# Patient Record
Sex: MALE | Race: WHITE | Employment: UNEMPLOYED | URBAN - METROPOLITAN AREA
[De-identification: names, ages, dates, MRNs, and addresses within clinical notes are randomized per-mention and may not be internally consistent; named-entity substitution may affect disease eponyms.]

---

## 2017-02-01 ENCOUNTER — HOSPITAL ENCOUNTER (OUTPATIENT)
Dept: CT IMAGING | Age: 37
Discharge: HOME | End: 2017-02-01
Attending: INTERNAL MEDICINE | Admitting: INTERNAL MEDICINE

## 2017-02-01 DIAGNOSIS — C18.2 CANCER OF RIGHT COLON (HCC): ICD-10-CM

## 2017-02-01 DIAGNOSIS — C18.2 MALIGNANT NEOPLASM OF ASCENDING COLON (HCC): ICD-10-CM

## 2017-02-01 DIAGNOSIS — C78.6 SECONDARY MALIGNANCY OF OMENTUM (HCC): ICD-10-CM

## 2017-02-07 PROBLEM — R52 PAIN MANAGEMENT: Status: ACTIVE | Noted: 2017-02-07

## 2017-06-05 PROBLEM — Z51.11 ENCOUNTER FOR ANTINEOPLASTIC CHEMOTHERAPY: Status: ACTIVE | Noted: 2017-06-05

## 2017-06-20 ENCOUNTER — HOSPITAL ENCOUNTER (OUTPATIENT)
Dept: CT IMAGING | Age: 37
Discharge: HOME | End: 2017-06-20
Attending: INTERNAL MEDICINE | Admitting: INTERNAL MEDICINE

## 2017-06-20 DIAGNOSIS — C18.2 CANCER OF RIGHT COLON (HCC): ICD-10-CM

## 2018-02-22 ENCOUNTER — HOSPITAL ENCOUNTER (OUTPATIENT)
Dept: CT IMAGING | Age: 38
Discharge: HOME | End: 2018-02-22
Attending: INTERNAL MEDICINE | Admitting: INTERNAL MEDICINE

## 2018-02-22 DIAGNOSIS — C18.2 ASCENDING COLON MALIGNANT NEOPLASM (HCC): ICD-10-CM

## 2018-04-02 ENCOUNTER — HOSPITAL ENCOUNTER (OUTPATIENT)
Dept: ULTRASOUND IMAGING | Age: 38
Discharge: HOME | End: 2018-04-02
Attending: PHYSICIAN ASSISTANT | Admitting: PHYSICIAN ASSISTANT

## 2018-04-02 DIAGNOSIS — N50.89 OTHER SPECIFIED DISORDERS OF THE MALE GENITAL ORGANS: ICD-10-CM

## 2018-04-17 ENCOUNTER — HOSPITAL ENCOUNTER (OUTPATIENT)
Dept: OTHER | Age: 38
Discharge: HOME | End: 2018-04-17
Attending: UROLOGY | Admitting: UROLOGY

## 2018-04-17 LAB
BILIRUBIN URINE: NEGATIVE
BLOOD, URINE: NEGATIVE
CLARITY: CLEAR
COLOR: YELLOW
GLUCOSE URINE: NEGATIVE MG/DL
KETONES, URINE: NEGATIVE MG/DL
LEUKOCYTE ESTERASE, URINE: NEGATIVE
MICROSCOPIC EXAMINATION: NORMAL
NITRITE, URINE: NEGATIVE
PH UA: 6.5
PROTEIN UA: NEGATIVE MG/DL
SPECIFIC GRAVITY UA: 1.02
URINE TYPE: NORMAL
UROBILINOGEN, URINE: 0.2 E.U./DL

## 2018-04-19 LAB — URINE CULTURE, ROUTINE: NORMAL

## 2018-04-25 ENCOUNTER — HOSPITAL ENCOUNTER (OUTPATIENT)
Dept: SURGERY | Age: 38
Discharge: HOME | End: 2018-04-25
Attending: UROLOGY | Admitting: UROLOGY

## 2018-04-25 VITALS
DIASTOLIC BLOOD PRESSURE: 68 MMHG | BODY MASS INDEX: 28.63 KG/M2 | SYSTOLIC BLOOD PRESSURE: 109 MMHG | HEIGHT: 70 IN | OXYGEN SATURATION: 96 % | TEMPERATURE: 97.2 F | RESPIRATION RATE: 20 BRPM | HEART RATE: 79 BPM | WEIGHT: 200 LBS

## 2018-04-25 DIAGNOSIS — G89.18 POST-OP PAIN: Primary | ICD-10-CM

## 2018-04-25 RX ORDER — SODIUM CHLORIDE 0.9 % (FLUSH) 0.9 %
10 SYRINGE (ML) INJECTION PRN
Status: DISCONTINUED | OUTPATIENT
Start: 2018-04-25 | End: 2018-04-26 | Stop reason: HOSPADM

## 2018-04-25 RX ORDER — OXYCODONE AND ACETAMINOPHEN 10; 325 MG/1; MG/1
1 TABLET ORAL EVERY 6 HOURS PRN
Qty: 20 TABLET | Refills: 0 | Status: SHIPPED | OUTPATIENT
Start: 2018-04-25 | End: 2018-05-02

## 2018-04-25 RX ORDER — DEXAMETHASONE SODIUM PHOSPHATE 4 MG/ML
4 INJECTION, SOLUTION INTRA-ARTICULAR; INTRALESIONAL; INTRAMUSCULAR; INTRAVENOUS; SOFT TISSUE
Status: ACTIVE | OUTPATIENT
Start: 2018-04-25 | End: 2018-04-25

## 2018-04-25 RX ORDER — LIDOCAINE HYDROCHLORIDE 10 MG/ML
1 INJECTION, SOLUTION EPIDURAL; INFILTRATION; INTRACAUDAL; PERINEURAL
Status: ACTIVE | OUTPATIENT
Start: 2018-04-25 | End: 2018-04-25

## 2018-04-25 RX ORDER — SODIUM CHLORIDE, SODIUM LACTATE, POTASSIUM CHLORIDE, CALCIUM CHLORIDE 600; 310; 30; 20 MG/100ML; MG/100ML; MG/100ML; MG/100ML
INJECTION, SOLUTION INTRAVENOUS CONTINUOUS
Status: DISCONTINUED | OUTPATIENT
Start: 2018-04-25 | End: 2018-04-26 | Stop reason: HOSPADM

## 2018-04-25 RX ORDER — FENTANYL CITRATE 50 UG/ML
50 INJECTION, SOLUTION INTRAMUSCULAR; INTRAVENOUS EVERY 5 MIN PRN
Status: DISCONTINUED | OUTPATIENT
Start: 2018-04-25 | End: 2018-04-26 | Stop reason: HOSPADM

## 2018-04-25 RX ORDER — FENTANYL CITRATE 50 UG/ML
25 INJECTION, SOLUTION INTRAMUSCULAR; INTRAVENOUS EVERY 5 MIN PRN
Status: DISCONTINUED | OUTPATIENT
Start: 2018-04-25 | End: 2018-04-26 | Stop reason: HOSPADM

## 2018-04-25 RX ORDER — SODIUM CHLORIDE 0.9 % (FLUSH) 0.9 %
SYRINGE (ML) INJECTION
Status: DISPENSED
Start: 2018-04-25 | End: 2018-04-25

## 2018-04-25 RX ORDER — SODIUM CHLORIDE 0.9 % (FLUSH) 0.9 %
10 SYRINGE (ML) INJECTION EVERY 12 HOURS SCHEDULED
Status: DISCONTINUED | OUTPATIENT
Start: 2018-04-25 | End: 2018-04-26 | Stop reason: HOSPADM

## 2018-04-25 RX ORDER — ONDANSETRON 2 MG/ML
4 INJECTION INTRAMUSCULAR; INTRAVENOUS
Status: ACTIVE | OUTPATIENT
Start: 2018-04-25 | End: 2018-04-25

## 2018-04-25 RX ADMIN — SODIUM CHLORIDE, SODIUM LACTATE, POTASSIUM CHLORIDE, CALCIUM CHLORIDE: 600; 310; 30; 20 INJECTION, SOLUTION INTRAVENOUS at 11:03

## 2018-04-25 RX ADMIN — SODIUM CHLORIDE, SODIUM LACTATE, POTASSIUM CHLORIDE, CALCIUM CHLORIDE: 600; 310; 30; 20 INJECTION, SOLUTION INTRAVENOUS at 09:08

## 2018-04-25 ASSESSMENT — PAIN - FUNCTIONAL ASSESSMENT: PAIN_FUNCTIONAL_ASSESSMENT: 0-10

## 2018-04-25 ASSESSMENT — PAIN SCALES - GENERAL
PAINLEVEL_OUTOF10: 0
PAINLEVEL_OUTOF10: 2
PAINLEVEL_OUTOF10: 2

## 2018-04-25 ASSESSMENT — LIFESTYLE VARIABLES: SMOKING_STATUS: 1

## 2018-04-25 ASSESSMENT — PAIN DESCRIPTION - DESCRIPTORS
DESCRIPTORS: BURNING
DESCRIPTORS: CONSTANT

## 2018-04-25 ASSESSMENT — PAIN DESCRIPTION - LOCATION
LOCATION: SCROTUM
LOCATION: SCROTUM

## 2018-04-25 ASSESSMENT — PAIN DESCRIPTION - PAIN TYPE
TYPE: SURGICAL PAIN
TYPE: SURGICAL PAIN

## 2018-06-11 PROBLEM — K86.0 CHRONIC ALCOHOLIC PANCREATITIS (HCC): Status: ACTIVE | Noted: 2018-06-11

## 2018-09-26 PROBLEM — Z51.11 ENCOUNTER FOR ANTINEOPLASTIC CHEMOTHERAPY: Status: RESOLVED | Noted: 2017-06-05 | Resolved: 2018-09-26

## 2019-02-04 ENCOUNTER — HOSPITAL ENCOUNTER (OUTPATIENT)
Dept: CT IMAGING | Age: 39
Discharge: HOME | End: 2019-02-04
Payer: MEDICARE

## 2019-02-04 ENCOUNTER — HOSPITAL ENCOUNTER (OUTPATIENT)
Age: 39
Discharge: HOME | End: 2019-02-04
Payer: MEDICARE

## 2019-02-04 DIAGNOSIS — C18.2 ASCENDING COLON MALIGNANT NEOPLASM (HCC): ICD-10-CM

## 2019-02-04 PROCEDURE — 74177 CT ABD & PELVIS W/CONTRAST: CPT

## 2019-02-04 PROCEDURE — 6360000004 HC RX CONTRAST MEDICATION: Performed by: INTERNAL MEDICINE

## 2019-02-04 RX ADMIN — IOPAMIDOL 100 ML: 755 INJECTION, SOLUTION INTRAVENOUS at 18:13

## 2019-02-04 RX ADMIN — IOHEXOL 50 ML: 240 INJECTION, SOLUTION INTRATHECAL; INTRAVASCULAR; INTRAVENOUS; ORAL at 18:13

## 2019-05-07 ENCOUNTER — HOSPITAL ENCOUNTER (INPATIENT)
Age: 39
LOS: 2 days | Discharge: HOME | DRG: 254 | End: 2019-05-10
Attending: EMERGENCY MEDICINE | Admitting: INTERNAL MEDICINE
Payer: MEDICARE

## 2019-05-07 DIAGNOSIS — K43.6 VENTRAL HERNIA WITH OBSTRUCTION AND WITHOUT GANGRENE: Primary | ICD-10-CM

## 2019-05-07 DIAGNOSIS — Z79.891 CHRONIC PRESCRIPTION OPIATE USE: ICD-10-CM

## 2019-05-07 DIAGNOSIS — K59.00 CONSTIPATION, UNSPECIFIED CONSTIPATION TYPE: ICD-10-CM

## 2019-05-07 DIAGNOSIS — K56.609 SBO (SMALL BOWEL OBSTRUCTION) (HCC): ICD-10-CM

## 2019-05-07 DIAGNOSIS — R11.2 NON-INTRACTABLE VOMITING WITH NAUSEA, UNSPECIFIED VOMITING TYPE: ICD-10-CM

## 2019-05-07 DIAGNOSIS — Z85.038 H/O COLON CANCER, STAGE IV: ICD-10-CM

## 2019-05-07 LAB
A/G RATIO: 1.3 (ref 1.1–2.2)
ALBUMIN SERPL-MCNC: 4 G/DL (ref 3.4–5)
ALP BLD-CCNC: 85 U/L (ref 40–129)
ALT SERPL-CCNC: 19 U/L (ref 10–40)
ANION GAP SERPL CALCULATED.3IONS-SCNC: 9 MMOL/L (ref 3–16)
AST SERPL-CCNC: 16 U/L (ref 15–37)
BASOPHILS ABSOLUTE: 0.1 K/UL (ref 0–0.2)
BASOPHILS RELATIVE PERCENT: 0.8 %
BILIRUB SERPL-MCNC: 0.5 MG/DL (ref 0–1)
BUN BLDV-MCNC: 8 MG/DL (ref 7–20)
CALCIUM SERPL-MCNC: 9 MG/DL (ref 8.3–10.6)
CHLORIDE BLD-SCNC: 98 MMOL/L (ref 99–110)
CO2: 27 MMOL/L (ref 21–32)
CREAT SERPL-MCNC: 0.7 MG/DL (ref 0.9–1.3)
EOSINOPHILS ABSOLUTE: 0.3 K/UL (ref 0–0.6)
EOSINOPHILS RELATIVE PERCENT: 3.4 %
GFR AFRICAN AMERICAN: >60
GFR NON-AFRICAN AMERICAN: >60
GLOBULIN: 3.2 G/DL
GLUCOSE BLD-MCNC: 101 MG/DL (ref 70–99)
HCT VFR BLD CALC: 40.8 % (ref 40.5–52.5)
HEMOGLOBIN: 14 G/DL (ref 13.5–17.5)
LIPASE: 40 U/L (ref 13–60)
LYMPHOCYTES ABSOLUTE: 2.7 K/UL (ref 1–5.1)
LYMPHOCYTES RELATIVE PERCENT: 30.4 %
MCH RBC QN AUTO: 30.5 PG (ref 26–34)
MCHC RBC AUTO-ENTMCNC: 34.4 G/DL (ref 31–36)
MCV RBC AUTO: 88.8 FL (ref 80–100)
MONOCYTES ABSOLUTE: 0.5 K/UL (ref 0–1.3)
MONOCYTES RELATIVE PERCENT: 5.9 %
NEUTROPHILS ABSOLUTE: 5.2 K/UL (ref 1.7–7.7)
NEUTROPHILS RELATIVE PERCENT: 59.5 %
PDW BLD-RTO: 13.7 % (ref 12.4–15.4)
PLATELET # BLD: 261 K/UL (ref 135–450)
PMV BLD AUTO: 7.7 FL (ref 5–10.5)
POTASSIUM SERPL-SCNC: 3.5 MMOL/L (ref 3.5–5.1)
RBC # BLD: 4.59 M/UL (ref 4.2–5.9)
SODIUM BLD-SCNC: 134 MMOL/L (ref 136–145)
TOTAL PROTEIN: 7.2 G/DL (ref 6.4–8.2)
WBC # BLD: 8.8 K/UL (ref 4–11)

## 2019-05-07 PROCEDURE — 96375 TX/PRO/DX INJ NEW DRUG ADDON: CPT

## 2019-05-07 PROCEDURE — 96374 THER/PROPH/DIAG INJ IV PUSH: CPT

## 2019-05-07 PROCEDURE — 96361 HYDRATE IV INFUSION ADD-ON: CPT

## 2019-05-07 PROCEDURE — 2580000003 HC RX 258: Performed by: NURSE PRACTITIONER

## 2019-05-07 PROCEDURE — 80053 COMPREHEN METABOLIC PANEL: CPT

## 2019-05-07 PROCEDURE — 99285 EMERGENCY DEPT VISIT HI MDM: CPT

## 2019-05-07 PROCEDURE — 6360000002 HC RX W HCPCS: Performed by: NURSE PRACTITIONER

## 2019-05-07 PROCEDURE — 83690 ASSAY OF LIPASE: CPT

## 2019-05-07 PROCEDURE — 85025 COMPLETE CBC W/AUTO DIFF WBC: CPT

## 2019-05-07 RX ORDER — ONDANSETRON 2 MG/ML
4 INJECTION INTRAMUSCULAR; INTRAVENOUS EVERY 30 MIN PRN
Status: DISCONTINUED | OUTPATIENT
Start: 2019-05-07 | End: 2019-05-08

## 2019-05-07 RX ORDER — 0.9 % SODIUM CHLORIDE 0.9 %
1000 INTRAVENOUS SOLUTION INTRAVENOUS ONCE
Status: COMPLETED | OUTPATIENT
Start: 2019-05-07 | End: 2019-05-08

## 2019-05-07 RX ADMIN — SODIUM CHLORIDE 1000 ML: 9 INJECTION, SOLUTION INTRAVENOUS at 23:49

## 2019-05-07 RX ADMIN — HYDROMORPHONE HYDROCHLORIDE 1 MG: 1 INJECTION, SOLUTION INTRAMUSCULAR; INTRAVENOUS; SUBCUTANEOUS at 23:50

## 2019-05-07 RX ADMIN — ONDANSETRON 4 MG: 2 INJECTION INTRAMUSCULAR; INTRAVENOUS at 23:50

## 2019-05-07 ASSESSMENT — PAIN DESCRIPTION - PAIN TYPE: TYPE: ACUTE PAIN

## 2019-05-07 ASSESSMENT — PAIN SCALES - GENERAL
PAINLEVEL_OUTOF10: 10
PAINLEVEL_OUTOF10: 10

## 2019-05-07 ASSESSMENT — PAIN DESCRIPTION - LOCATION: LOCATION: ABDOMEN

## 2019-05-08 ENCOUNTER — APPOINTMENT (OUTPATIENT)
Dept: CT IMAGING | Age: 39
DRG: 254 | End: 2019-05-08
Payer: MEDICARE

## 2019-05-08 PROBLEM — K56.609 BOWEL OBSTRUCTION (HCC): Status: ACTIVE | Noted: 2019-05-08

## 2019-05-08 LAB
ALBUMIN SERPL-MCNC: 3.8 G/DL (ref 3.4–5)
ANION GAP SERPL CALCULATED.3IONS-SCNC: 7 MMOL/L (ref 3–16)
BASOPHILS ABSOLUTE: 0.1 K/UL (ref 0–0.2)
BASOPHILS RELATIVE PERCENT: 0.9 %
BILIRUBIN URINE: NEGATIVE
BLOOD, URINE: NEGATIVE
BUN BLDV-MCNC: 7 MG/DL (ref 7–20)
CALCIUM SERPL-MCNC: 8.5 MG/DL (ref 8.3–10.6)
CHLORIDE BLD-SCNC: 104 MMOL/L (ref 99–110)
CLARITY: CLEAR
CO2: 27 MMOL/L (ref 21–32)
COLOR: YELLOW
CREAT SERPL-MCNC: 0.6 MG/DL (ref 0.9–1.3)
EOSINOPHILS ABSOLUTE: 0.3 K/UL (ref 0–0.6)
EOSINOPHILS RELATIVE PERCENT: 4.7 %
GFR AFRICAN AMERICAN: >60
GFR NON-AFRICAN AMERICAN: >60
GLUCOSE BLD-MCNC: 103 MG/DL (ref 70–99)
GLUCOSE URINE: NEGATIVE MG/DL
HCT VFR BLD CALC: 39.3 % (ref 40.5–52.5)
HEMOGLOBIN: 13.5 G/DL (ref 13.5–17.5)
KETONES, URINE: NEGATIVE MG/DL
LACTIC ACID: 0.6 MMOL/L (ref 0.4–2)
LEUKOCYTE ESTERASE, URINE: NEGATIVE
LYMPHOCYTES ABSOLUTE: 2.5 K/UL (ref 1–5.1)
LYMPHOCYTES RELATIVE PERCENT: 36 %
MCH RBC QN AUTO: 30.9 PG (ref 26–34)
MCHC RBC AUTO-ENTMCNC: 34.4 G/DL (ref 31–36)
MCV RBC AUTO: 89.8 FL (ref 80–100)
MICROSCOPIC EXAMINATION: NORMAL
MONOCYTES ABSOLUTE: 0.5 K/UL (ref 0–1.3)
MONOCYTES RELATIVE PERCENT: 6.8 %
NEUTROPHILS ABSOLUTE: 3.6 K/UL (ref 1.7–7.7)
NEUTROPHILS RELATIVE PERCENT: 51.6 %
NITRITE, URINE: NEGATIVE
PDW BLD-RTO: 13.8 % (ref 12.4–15.4)
PH UA: 7 (ref 5–8)
PHOSPHORUS: 4.4 MG/DL (ref 2.5–4.9)
PLATELET # BLD: 215 K/UL (ref 135–450)
PMV BLD AUTO: 7.6 FL (ref 5–10.5)
POTASSIUM SERPL-SCNC: 3.5 MMOL/L (ref 3.5–5.1)
PROTEIN UA: NEGATIVE MG/DL
RBC # BLD: 4.37 M/UL (ref 4.2–5.9)
SODIUM BLD-SCNC: 138 MMOL/L (ref 136–145)
SPECIFIC GRAVITY UA: <=1.005 (ref 1–1.03)
URINE TYPE: NORMAL
UROBILINOGEN, URINE: 0.2 E.U./DL
WBC # BLD: 7 K/UL (ref 4–11)

## 2019-05-08 PROCEDURE — 6360000002 HC RX W HCPCS: Performed by: INTERNAL MEDICINE

## 2019-05-08 PROCEDURE — 80069 RENAL FUNCTION PANEL: CPT

## 2019-05-08 PROCEDURE — 96375 TX/PRO/DX INJ NEW DRUG ADDON: CPT

## 2019-05-08 PROCEDURE — 2580000003 HC RX 258: Performed by: EMERGENCY MEDICINE

## 2019-05-08 PROCEDURE — 2580000003 HC RX 258: Performed by: INTERNAL MEDICINE

## 2019-05-08 PROCEDURE — 96376 TX/PRO/DX INJ SAME DRUG ADON: CPT

## 2019-05-08 PROCEDURE — 81003 URINALYSIS AUTO W/O SCOPE: CPT

## 2019-05-08 PROCEDURE — 96361 HYDRATE IV INFUSION ADD-ON: CPT

## 2019-05-08 PROCEDURE — 74177 CT ABD & PELVIS W/CONTRAST: CPT

## 2019-05-08 PROCEDURE — 6360000002 HC RX W HCPCS

## 2019-05-08 PROCEDURE — 99255 IP/OBS CONSLTJ NEW/EST HI 80: CPT | Performed by: SURGERY

## 2019-05-08 PROCEDURE — 83605 ASSAY OF LACTIC ACID: CPT

## 2019-05-08 PROCEDURE — 6360000004 HC RX CONTRAST MEDICATION: Performed by: NURSE PRACTITIONER

## 2019-05-08 PROCEDURE — 85025 COMPLETE CBC W/AUTO DIFF WBC: CPT

## 2019-05-08 PROCEDURE — 1200000000 HC SEMI PRIVATE

## 2019-05-08 PROCEDURE — 6360000002 HC RX W HCPCS: Performed by: EMERGENCY MEDICINE

## 2019-05-08 PROCEDURE — 6360000002 HC RX W HCPCS: Performed by: NURSE PRACTITIONER

## 2019-05-08 RX ORDER — LORAZEPAM 2 MG/ML
2 INJECTION INTRAMUSCULAR ONCE
Status: COMPLETED | OUTPATIENT
Start: 2019-05-08 | End: 2019-05-08

## 2019-05-08 RX ORDER — LORAZEPAM 2 MG/ML
INJECTION INTRAMUSCULAR
Status: COMPLETED
Start: 2019-05-08 | End: 2019-05-08

## 2019-05-08 RX ORDER — HYDROMORPHONE HCL 110MG/55ML
0.5 PATIENT CONTROLLED ANALGESIA SYRINGE INTRAVENOUS
Status: DISCONTINUED | OUTPATIENT
Start: 2019-05-08 | End: 2019-05-08

## 2019-05-08 RX ORDER — HYDROMORPHONE HCL 110MG/55ML
1 PATIENT CONTROLLED ANALGESIA SYRINGE INTRAVENOUS
Status: DISCONTINUED | OUTPATIENT
Start: 2019-05-08 | End: 2019-05-09

## 2019-05-08 RX ORDER — SODIUM CHLORIDE 0.9 % (FLUSH) 0.9 %
10 SYRINGE (ML) INJECTION EVERY 12 HOURS SCHEDULED
Status: DISCONTINUED | OUTPATIENT
Start: 2019-05-08 | End: 2019-05-10 | Stop reason: HOSPADM

## 2019-05-08 RX ORDER — ONDANSETRON 2 MG/ML
4 INJECTION INTRAMUSCULAR; INTRAVENOUS EVERY 6 HOURS PRN
Status: DISCONTINUED | OUTPATIENT
Start: 2019-05-08 | End: 2019-05-10 | Stop reason: HOSPADM

## 2019-05-08 RX ORDER — SODIUM CHLORIDE 9 MG/ML
INJECTION, SOLUTION INTRAVENOUS CONTINUOUS
Status: DISCONTINUED | OUTPATIENT
Start: 2019-05-08 | End: 2019-05-10 | Stop reason: HOSPADM

## 2019-05-08 RX ORDER — SODIUM CHLORIDE, SODIUM LACTATE, POTASSIUM CHLORIDE, CALCIUM CHLORIDE 600; 310; 30; 20 MG/100ML; MG/100ML; MG/100ML; MG/100ML
1000 INJECTION, SOLUTION INTRAVENOUS ONCE
Status: COMPLETED | OUTPATIENT
Start: 2019-05-08 | End: 2019-05-08

## 2019-05-08 RX ORDER — SODIUM CHLORIDE 0.9 % (FLUSH) 0.9 %
10 SYRINGE (ML) INJECTION PRN
Status: DISCONTINUED | OUTPATIENT
Start: 2019-05-08 | End: 2019-05-10 | Stop reason: HOSPADM

## 2019-05-08 RX ADMIN — IOPAMIDOL 75 ML: 755 INJECTION, SOLUTION INTRAVENOUS at 00:56

## 2019-05-08 RX ADMIN — SODIUM CHLORIDE: 9 INJECTION, SOLUTION INTRAVENOUS at 08:17

## 2019-05-08 RX ADMIN — HYDROMORPHONE HYDROCHLORIDE 1 MG: 2 INJECTION, SOLUTION INTRAMUSCULAR; INTRAVENOUS; SUBCUTANEOUS at 20:19

## 2019-05-08 RX ADMIN — HYDROMORPHONE HYDROCHLORIDE 0.5 MG: 2 INJECTION, SOLUTION INTRAMUSCULAR; INTRAVENOUS; SUBCUTANEOUS at 06:01

## 2019-05-08 RX ADMIN — HYDROMORPHONE HYDROCHLORIDE 1 MG: 2 INJECTION, SOLUTION INTRAMUSCULAR; INTRAVENOUS; SUBCUTANEOUS at 18:11

## 2019-05-08 RX ADMIN — HYDROMORPHONE HYDROCHLORIDE 1 MG: 1 INJECTION, SOLUTION INTRAMUSCULAR; INTRAVENOUS; SUBCUTANEOUS at 03:51

## 2019-05-08 RX ADMIN — SODIUM CHLORIDE: 9 INJECTION, SOLUTION INTRAVENOUS at 05:51

## 2019-05-08 RX ADMIN — HYDROMORPHONE HYDROCHLORIDE 0.5 MG: 1 INJECTION, SOLUTION INTRAMUSCULAR; INTRAVENOUS; SUBCUTANEOUS at 01:39

## 2019-05-08 RX ADMIN — HYDROMORPHONE HYDROCHLORIDE 1 MG: 2 INJECTION, SOLUTION INTRAMUSCULAR; INTRAVENOUS; SUBCUTANEOUS at 15:55

## 2019-05-08 RX ADMIN — LORAZEPAM 2 MG: 2 INJECTION, SOLUTION INTRAMUSCULAR; INTRAVENOUS at 03:53

## 2019-05-08 RX ADMIN — HYDROMORPHONE HYDROCHLORIDE 0.5 MG: 2 INJECTION, SOLUTION INTRAMUSCULAR; INTRAVENOUS; SUBCUTANEOUS at 08:18

## 2019-05-08 RX ADMIN — HYDROMORPHONE HYDROCHLORIDE 1 MG: 2 INJECTION, SOLUTION INTRAMUSCULAR; INTRAVENOUS; SUBCUTANEOUS at 11:30

## 2019-05-08 RX ADMIN — LORAZEPAM 2 MG: 2 INJECTION INTRAMUSCULAR at 03:53

## 2019-05-08 RX ADMIN — HYDROMORPHONE HYDROCHLORIDE 0.5 MG: 1 INJECTION, SOLUTION INTRAMUSCULAR; INTRAVENOUS; SUBCUTANEOUS at 02:50

## 2019-05-08 RX ADMIN — SODIUM CHLORIDE, POTASSIUM CHLORIDE, SODIUM LACTATE AND CALCIUM CHLORIDE 1000 ML: 600; 310; 30; 20 INJECTION, SOLUTION INTRAVENOUS at 02:43

## 2019-05-08 RX ADMIN — HYDROMORPHONE HYDROCHLORIDE 1 MG: 2 INJECTION, SOLUTION INTRAMUSCULAR; INTRAVENOUS; SUBCUTANEOUS at 22:14

## 2019-05-08 RX ADMIN — HYDROMORPHONE HYDROCHLORIDE 1 MG: 2 INJECTION, SOLUTION INTRAMUSCULAR; INTRAVENOUS; SUBCUTANEOUS at 13:55

## 2019-05-08 RX ADMIN — Medication 10 ML: at 20:20

## 2019-05-08 ASSESSMENT — PAIN DESCRIPTION - DESCRIPTORS
DESCRIPTORS: SPASM
DESCRIPTORS: CONSTANT

## 2019-05-08 ASSESSMENT — PAIN DESCRIPTION - LOCATION
LOCATION: ABDOMEN

## 2019-05-08 ASSESSMENT — PAIN DESCRIPTION - PAIN TYPE
TYPE: ACUTE PAIN

## 2019-05-08 ASSESSMENT — PAIN DESCRIPTION - ONSET
ONSET: ON-GOING
ONSET: ON-GOING

## 2019-05-08 ASSESSMENT — PAIN SCALES - GENERAL
PAINLEVEL_OUTOF10: 7
PAINLEVEL_OUTOF10: 10
PAINLEVEL_OUTOF10: 7
PAINLEVEL_OUTOF10: 7
PAINLEVEL_OUTOF10: 8
PAINLEVEL_OUTOF10: 9
PAINLEVEL_OUTOF10: 7
PAINLEVEL_OUTOF10: 8
PAINLEVEL_OUTOF10: 9
PAINLEVEL_OUTOF10: 8
PAINLEVEL_OUTOF10: 8
PAINLEVEL_OUTOF10: 7
PAINLEVEL_OUTOF10: 8
PAINLEVEL_OUTOF10: 8

## 2019-05-08 ASSESSMENT — PAIN DESCRIPTION - PROGRESSION: CLINICAL_PROGRESSION: NOT CHANGED

## 2019-05-08 ASSESSMENT — PAIN DESCRIPTION - FREQUENCY
FREQUENCY: CONTINUOUS
FREQUENCY: CONTINUOUS

## 2019-05-08 ASSESSMENT — PAIN DESCRIPTION - ORIENTATION
ORIENTATION: MID
ORIENTATION: LOWER;MID

## 2019-05-08 NOTE — ED NOTES
Provided recliner to wife to rest at bedside      Juancarlos Pennington [facility]  05/08/19 2630

## 2019-05-08 NOTE — H&P
mg by mouth every 12 hours as needed. . 8/7/17  Yes Juan Jose José Miguel, APRN - CNP       Allergies:  Patient has no known allergies. Social History:      The patient currently lives at home    TOBACCO:   reports that he has been smoking cigarettes. He has a 5.00 pack-year smoking history. He has never used smokeless tobacco.  ETOH:   reports that he drinks alcohol. Family History:      Negative for colon cancer, heart disease, lung disease. History reviewed. No pertinent family history. REVIEW OF SYSTEMS:   Pertinent positives as noted in the HPI. All other systems reviewed and negative. PHYSICAL EXAM PERFORMED:    /70   Pulse 81   Temp 98.3 °F (36.8 °C) (Oral)   Resp 16   Ht 5' 10\" (1.778 m)   Wt 210 lb (95.3 kg)   SpO2 93%   BMI 30.13 kg/m²     General appearance:  No apparent distress, appears stated age and cooperative. HEENT:  Normal cephalic, atraumatic without obvious deformity. Pupils equal, round, and reactive to light. Extra ocular muscles intact. Conjunctivae/corneas clear. Respiratory:  Normal respiratory effort. Clear to auscultation, bilaterally without Rales/Wheezes/Rhonchi. Cardiovascular:  Regular rate and rhythm with normal S1/S2 without murmurs, rubs or gallops. Abdomen: Soft, tender to palpation, palpable hernias present. Musculoskeletal:  No clubbing, cyanosis or edema bilaterally. Skin: Skin color, texture, turgor normal.  Warm and dry  Neurologic:  Neurovascularly intact without any focal sensory/motor deficits.  Cranial nerves: II-XII intact, grossly non-focal.  Psychiatric:  Alert and oriented, thought content appropriate, normal insight  Capillary Refill: Brisk,< 3 seconds   Peripheral Pulses: +2 palpable, equal bilaterally       Labs:     Recent Labs     05/07/19 2255 05/08/19  0535   WBC 8.8 7.0   HGB 14.0 13.5   HCT 40.8 39.3*    215     Recent Labs     05/07/19 2255 05/08/19  0535   * 138   K 3.5 3.5   CL 98* 104   CO2 27 27   BUN 8 7 CREATININE 0.7* 0.6*   CALCIUM 9.0 8.5   PHOS  --  4.4     Recent Labs     05/07/19  2255   AST 16   ALT 19   BILITOT 0.5   ALKPHOS 85     No results for input(s): INR in the last 72 hours. No results for input(s): Delcie Bahama in the last 72 hours. Urinalysis:      Lab Results   Component Value Date    NITRU Negative 05/08/2019    WBCUA 0-2 09/08/2016    BACTERIA 1+ 08/18/2016    RBCUA 3-5 09/08/2016    BLOODU Negative 05/08/2019    SPECGRAV <=1.005 05/08/2019    GLUCOSEU Negative 05/08/2019       Radiology:         CT ABDOMEN PELVIS W IV CONTRAST Additional Contrast? None   Final Result   Large ventral hernia containing hazy mesenteric fat, multiple loops of small   bowel and colon and loculated fluid. Some of the loops of small bowel   demonstrate wall thickening with air-fluid levels and fecalization of   contents. Multiple loops of prominent small bowel in the anterior abdomen   with associated mesenteric swirling. Overall, findings suggest small bowel   obstruction. The new lump on the left side may relate to new loculated fluid in the   patient's ventral hernia which was not seen on the prior study and is located   along the left lateral aspect of the umbilicus. Increased small amount of free fluid in the abdomen and pelvis. Fluid in the   posterior pelvis demonstrates a fluid hematocrit level suggestive of a small   amount of blood products. Increase in size of a peripherally enhancing fluid collection in the right   pericolic gutter just inferior to the liver which measures 4.1 x 3.3 cm,   previously 3.0 x 3.0 cm. This could represent a small abscess. Focal areas of hypoattenuation in the subcapsular liver are mildly increased   from the prior study. Underlying neoplastic process is not excluded. Similar-appearing upper abdominal and retroperitoneal lymphadenopathy.              ASSESSMENT/PLAN:    Active Hospital Problems    Diagnosis Date Noted    Bowel obstruction ([facility] [address] [B26.888] 05/08/2019       SBO  -NPO  -pain management with dilaudid  -surgery consulted    Stage 4 colon ca  -continue outpatient management    Tobacco abuse  -pt refuses patch at this time    DVT Prophylaxis: SCD  Diet: Diet NPO Effective Now  Code Status: Full Code    PT/OT Eval Status: n/a    Dispo - 2-3 days       Luiz Mott MD    Thank you No primary care provider on file. for the opportunity to be involved in this patient's care. If you have any questions or concerns please feel free to contact me at [phone].

## 2019-05-08 NOTE — PROGRESS NOTES
Patient from ED. 4 eye assessment with farzaneh BRIDGES. no skin issues noted. Assessment completed and documented. VSS. A/ox4. Denies pain. Denies n/v. Patient NPO. Tele on. Denies further needs at this time. Bed locked and in lowest position. Bedside table and call light within reach. Will continue to monitor.

## 2019-05-08 NOTE — PROGRESS NOTES
No clubbing, cyanosis or edema bilaterally. Skin: Skin color, texture, turgor normal.  Warm and dry  Neurologic:  Neurovascularly intact without any focal sensory/motor deficits. Cranial nerves: II-XII intact, grossly non-focal.  Psychiatric:  Alert and oriented, thought content appropriate, normal insight  Capillary Refill: Brisk,< 3 seconds   Peripheral Pulses: +2 palpable, equal bilaterally       Labs:   Recent Labs     05/07/19 2255 05/08/19  0535   WBC 8.8 7.0   HGB 14.0 13.5   HCT 40.8 39.3*    215     Recent Labs     05/07/19 2255 05/08/19  0535   * 138   K 3.5 3.5   CL 98* 104   CO2 27 27   BUN 8 7   CREATININE 0.7* 0.6*   CALCIUM 9.0 8.5   PHOS  --  4.4     Recent Labs     05/07/19 2255   AST 16   ALT 19   BILITOT 0.5   ALKPHOS 85     No results for input(s): INR in the last 72 hours. No results for input(s): Mariama Lacrosse in the last 72 hours. Urinalysis:      Lab Results   Component Value Date    NITRU Negative 05/08/2019    WBCUA 0-2 09/08/2016    BACTERIA 1+ 08/18/2016    RBCUA 3-5 09/08/2016    BLOODU Negative 05/08/2019    SPECGRAV <=1.005 05/08/2019    GLUCOSEU Negative 05/08/2019       Radiology:  CT ABDOMEN PELVIS W IV CONTRAST Additional Contrast? None   Final Result   Large ventral hernia containing hazy mesenteric fat, multiple loops of small   bowel and colon and loculated fluid. Some of the loops of small bowel   demonstrate wall thickening with air-fluid levels and fecalization of   contents. Multiple loops of prominent small bowel in the anterior abdomen   with associated mesenteric swirling. Overall, findings suggest small bowel   obstruction. The new lump on the left side may relate to new loculated fluid in the   patient's ventral hernia which was not seen on the prior study and is located   along the left lateral aspect of the umbilicus. Increased small amount of free fluid in the abdomen and pelvis.   Fluid in the   posterior pelvis demonstrates a fluid hematocrit level suggestive of a small   amount of blood products. Increase in size of a peripherally enhancing fluid collection in the right   pericolic gutter just inferior to the liver which measures 4.1 x 3.3 cm,   previously 3.0 x 3.0 cm. This could represent a small abscess. Focal areas of hypoattenuation in the subcapsular liver are mildly increased   from the prior study. Underlying neoplastic process is not excluded. Similar-appearing upper abdominal and retroperitoneal lymphadenopathy. Assessment/Plan:    Active Hospital Problems    Diagnosis Date Noted    Bowel obstruction [facility]) [F77.409] 05/08/2019    Ventral hernia with obstruction and without gangrene [K43.6]      Ventral hernia with partial bowel obstruction  - Noted on CT abd/pel  - had prior ventral hernia repair but has recurred  - continue pain control  - Surgery consulted  - will likely need surgical repair. If symptoms improve, may be done as an outpatient  - no evidence of bowel ischemia currently    Stage IV colon cancer  - largely appears stable  - continue to follow up as an outpatient.     DVT Prophylaxis: SCDs  Diet: DIET LOW FIBER;  Code Status: Full Code    PT/OT Eval Status: not ordered    Dispo - possibly 1-2 days if no surgery needed    Rajesh Tobar MD

## 2019-05-08 NOTE — PROGRESS NOTES
Shift assessment updated as charted. VSS. Patient c/o pain, see MAR for intervention. Patient denies needs at this time. Call light in reach. Will monitor.

## 2019-05-08 NOTE — ED NOTES
Patient alert and oriented reports pain to abdomen at 10 on 1-10 scale. Patient is cooperative at this time.  Wife at bedside and call light in lap     Rhea Kulkarni RN  05/08/19 0001

## 2019-05-08 NOTE — CONSULTS
Department of General Surgery Consult    PATIENT NAME: David Smith OF BIRTH: 1980    ADMISSION DATE: 5/7/2019 10:03 PM      TODAY'S DATE: 5/8/2019    Reason for Consult:  Incisional hernia    Chief Complaint: Abdominal pain    Requesting Physician:  Seth Cleary    HISTORY OF PRESENT ILLNESS:              The patient is a 37 y.o. male who presents with increased swelling and pain at his long-standing R-sided abdominal incisional hernia. Pt has long h/o metastatic colon cancer. In 2016 we attempted repair of his R transverse incisional hernia, but at the time found extensive metastatic disease at his prior ileocolic anastomosis which required re-resection. For that reason and w/ the other diffuse peritoneal carcinomatosis, we only repaired his hernia primarily w/o mesh. He promptly re-developed his hernia and it has been monitored since then. Now w/ increasing pain and distention at his hernia site, and had N/V over past few days. Still w/ flatus, BMs however. Currently resting comfortably, c/o minimal pain at hernia. Pt is on maintenance chemo therapy w/ Keytruda, managed by Dr Jina Crews.     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    abscess, ABD       Past Surgical History:        Procedure Laterality Date    COLON SURGERY  05/2015    COLONOSCOPY  2015    bx polyps    HERNIA REPAIR      HYDROCELE EXCISION Right 04/25/2018    OTHER SURGICAL HISTORY  05/28/2015    lap converted to open right colectomy    OTHER SURGICAL HISTORY Left 06/2015    mediport    OTHER SURGICAL HISTORY  07/06/2016    hernia repair and bowel resection       Current Medications:   Current Facility-Administered Medications: sodium chloride flush 0.9 % injection 10 mL, 10 mL, Intravenous, 2 times per day  sodium chloride flush 0.9 % injection 10 mL, 10 mL, Intravenous, PRN  magnesium hydroxide (MILK OF MAGNESIA) 400 MG/5ML suspension 30 mL, 30 mL, Oral, Daily PRN  ondansetron (ZOFRAN) injection 4 mg, 4 mg, Intravenous, Q6H PRN  0.9 % sodium chloride infusion, , Intravenous, Continuous  HYDROmorphone (DILAUDID) injection 0.5 mg, 0.5 mg, Intravenous, Q2H PRN  Prior to Admission medications    Medication Sig Start Date End Date Taking? Authorizing Provider   oxyCODONE HCl (OXY-IR) 10 MG immediate release tablet 1 po q 4 h prn pain. 8/7/17  Yes SURYA Alves CNP   morphine (MS CONTIN) 30 MG extended release tablet Take 1 tablet by mouth 2 times daily . Patient taking differently: Take 30 mg by mouth every 12 hours as needed. . 8/7/17  Yes SURYA Alves CNP        Allergies:  Patient has no known allergies. Social History:   TOBACCO:   reports that he has been smoking cigarettes. He has a 5.00 pack-year smoking history. He has never used smokeless tobacco.  ETOH:   reports that he drinks alcohol. DRUGS:   reports that he has current or past drug history. Drug: Marijuana. OCCUPATION:    Patient currently lives with family      Family History:    History reviewed. No pertinent family history. REVIEW OF SYSTEMS:  CONSTITUTIONAL:  positive for  malaise  HEENT:  negative  RESPIRATORY:  negative  CARDIOVASCULAR:  negative  GASTROINTESTINAL:  negative except for nausea, vomiting and abdominal pain  GENITOURINARY:  negative  HEMATOLOGIC/LYMPHATIC:  negative  NEUROLOGICAL:  Negative  * All other ROS reviewed and negative. PHYSICAL EXAM:  VITALS:  /70   Pulse 81   Temp 98.3 °F (36.8 °C) (Oral)   Resp 16   Ht 5' 10\" (1.778 m)   Wt 210 lb (95.3 kg)   SpO2 93%   BMI 30.13 kg/m²   24HR INTAKE/OUTPUT:    I/O last 3 completed shifts: In: 1000 [IV Piggyback:1000]  Out: -   No intake/output data recorded.     CONSTITUTIONAL:  alert, no apparent distress and normal weight  EYES: PERRL, sclera clear  ENT:  Normocepalic,atraumatic, without obvious abnormality  NECK:  supple, symmetrical, trachea midline  LUNGS: Resp effort easy and unlabored,    CARDIOVASCULAR:  NO JVD, regular rate and rhythm and    ABDOMEN:  scars noted - R transverse,  , soft, non-distended, non-tender, involuntary guarding absent,   and hernia present  - large incisional hernia, soft, minimal tenderness, no overlying erythema  MUSCULOSKELETAL: No clubbing or cyanosis, 0+ pitting edema lower extremities  NEUROLOGIC:  Mental Status Exam:  Level of Alertness:   awake  PSYCHIATRIC:   person, place, time  SKIN:  normal skin color, texture, turgor    DATA:    CBC:   Recent Labs     05/07/19 2255 05/08/19  0535   WBC 8.8 7.0   HGB 14.0 13.5   HCT 40.8 39.3*    215     BMP:    Recent Labs     05/07/19 2255 05/08/19  0535   * 138   K 3.5 3.5   CL 98* 104   CO2 27 27   BUN 8 7   CREATININE 0.7* 0.6*   GLUCOSE 101* 103*     Hepatic:   Recent Labs     05/07/19 2255   AST 16   ALT 19   BILITOT 0.5   ALKPHOS 85     Mag:    No results for input(s): MG in the last 72 hours. Phos:     Recent Labs     05/08/19  0535   PHOS 4.4      INR: No results for input(s): INR in the last 72 hours. Radiology Review: Images personally reviewed by me.    Large ventral hernia containing hazy mesenteric fat, multiple loops of small   bowel and colon and loculated fluid.  Some of the loops of small bowel   demonstrate wall thickening with air-fluid levels and fecalization of   contents.  Multiple loops of prominent small bowel in the anterior abdomen   with associated mesenteric swirling.  Overall, findings suggest small bowel   obstruction.       The new lump on the left side may relate to new loculated fluid in the   patient's ventral hernia which was not seen on the prior study and is located   along the left lateral aspect of the umbilicus.       Increased small amount of free fluid in the abdomen and pelvis.  Fluid in the posterior pelvis demonstrates a fluid hematocrit level suggestive of a small   amount of blood products.       Increase in size of a peripherally enhancing fluid collection in the right   pericolic gutter just inferior to the liver which measures 4.1 x 3.3 cm,   previously 3.0 x 3.0 cm.  This could represent a small abscess.       Focal areas of hypoattenuation in the subcapsular liver are mildly increased   from the prior study. Werner Stabs neoplastic process is not excluded.       Similar-appearing upper abdominal and retroperitoneal lymphadenopathy.               IMPRESSION/RECOMMENDATIONS:    Persistent complex recurrent incisional hernia, w/o true, complete SBO, but getting increasingly symptomatic. His overall tumor burden has been amazingly well-controlled, overall. I think it would be reasonable to consider re-attempt at hernia repair. Last time, due to the extensive carcinomatosis as well as need for repeat bowel resection, we did not place any mesh to reinforce the hernia closure, and not surprisingly he promptly recurred his hernia. With the increasing symptoms and the overall, relative stability of his tumor burden, I think it would be reasonable to attempt another repair of the hernia, this time hopefully with mesh reinforcement. While we could consider taking him to surgery as soon as today, he does not wish to do it this quickly; and it would always be better to have more time to prepare for a potentially difficult procedure. OK to eat/drink for now. If tolerates diet and maintains bowel function, could be d/c'd home when he feels comfortable.     Electronically signed by Lina Call MD     [address] Surgery  [id number]

## 2019-05-08 NOTE — ED PROVIDER NOTES
[address] [facility]  ED      CHIEF COMPLAINT  Abdominal Pain (is being treated for colon CA. 36hrs ago large solid lump appeared on left side of abd as well as severe abd pain. Tender if lump is touched. New onset N/V. feels constipated )      HISTORY OF PRESENT ILLNESS  Anita Brizuela is a 37 y.o. male who presents to the ED complaining of abdominal pain. History of colon cancer stage IV with mets to lymph nodes, having severe pain and Monday a lump popped up in his stomach on the left side. Has a hernia on the right side. Describes pain as spasming. Not holding down food or fluids. Nba Abraham, Dr. Jina Crews. Takes percocet most recent dose was last night, morphine 30 ER, dose last night. Had BM today and this was constipated which is unusual for him. No CP or SOB. History of pancreatitis. The patient is currently rating their pain as 10/10 and describes it as an aching type of pain. The patient denies other complaints, modifying factors or associated symptoms. The patient arrived to the ED via private car. Patient is accompanied by family whois/are bedside for the visit.     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 120 tablet 0    morphine (MS CONTIN) 30 MG extended release tablet Take 1 tablet by mouth 2 times daily . (Patient taking differently: Take 30 mg by mouth every 12 hours as needed. Himanshu Mayer ) 60 tablet 0       ALLERGIES    No Known Allergies    FAMILY HISTORY    History reviewed. No pertinent family history. SOCIAL HISTORY    Social History     Socioeconomic History    Marital status: Single     Spouse name: None    Number of children: None    Years of education: None    Highest education level: None   Occupational History    None   Social Needs    Financial resource strain: None    Food insecurity:     Worry: None     Inability: None    Transportation needs:     Medical: None     Non-medical: None   Tobacco Use    Smoking status: Current Every Day Smoker     Packs/day: 0.50     Years: 10.00     Pack years: 5.00     Types: Cigarettes    Smokeless tobacco: Never Used    Tobacco comment: currently smoking 1/2 ppd   Substance and Sexual Activity    Alcohol use: Yes     Comment: RARELY, was a heavy drinker    Drug use: Yes     Types: Marijuana     Comment: STOPPED 1/2018    Sexual activity: None   Lifestyle    Physical activity:     Days per week: None     Minutes per session: None    Stress: None   Relationships    Social connections:     Talks on phone: None     Gets together: None     Attends Synagogue service: None     Active member of club or organization: None     Attends meetings of clubs or organizations: None     Relationship status: None    Intimate partner violence:     Fear of current or ex partner: None     Emotionally abused: None     Physically abused: None     Forced sexual activity: None   Other Topics Concern    None   Social History Narrative    None       REVIEW OFSYSTEMS    10systems reviewed, pertinent positives per HPI otherwise noted to be negative.     PHYSICAL EXAM  Physical Exam  Vitals:    05/07/19 2209   BP: (!) 141/91   Pulse: 111 Resp: 20   Temp: 98.6 °F (37 °C)   SpO2: 98%       GENERAL: Patient is well-developed, well-nourished. Awake andalert. Cooperative. Resting in bed. No apparent distress. HEENT:  Normocephalic, atraumatic. Conjunctivaappear normal. Sclera is non-icteric. External ears are normal.    NECK: Supple with normal ROM. Tracheamidline  LUNGS: Equal and symmetric chest rise. Breathing is unlabored. Speaking comfortably in fullsentences. Lungs are clear bilaterally to auscultation. Without wheezing, rales, or rhonchi. CADIOVASCULAR:  Regular rate and rhythm. Normal S1-S2 sounds. No murmurs, rubs, or gallops. GI: Soft, left enid-umbilical pain to papation, there is right sided hernia that is chronic, nondistended with positive bowel sounds. Patient is guarding to the left side of the abdomen. No rebound tenderness or rigidity. Negative Parada's sign. Negative Rovsing's sign. No CVAT to palpation. No masses or hepatosplenomegaly to palpation. MUSCULOSKELETAL:  No gross deformities or trauma noted. Moving all extremities equally and appropriately. Normal ROM. SKIN: Warm/dry. Skin is intact. No rashes or lesions noted. PSYCHIATRIC: Mood and affect appropriate. Speech is clear and articulate. NEUROLOGIC: Alert and oriented. No focal motor or sensory deficits.  Gait was observed and is normal.    LABS   Results for orders placed or performed during the hospital encounter of 05/07/19   CBC Auto Differential   Result Value Ref Range    WBC 8.8 4.0 - 11.0 K/uL    RBC 4.59 4.20 - 5.90 M/uL    Hemoglobin 14.0 13.5 - 17.5 g/dL    Hematocrit 40.8 40.5 - 52.5 %    MCV 88.8 80.0 - 100.0 fL    MCH 30.5 26.0 - 34.0 pg    MCHC 34.4 31.0 - 36.0 g/dL    RDW 13.7 12.4 - 15.4 %    Platelets 911 021 - 589 K/uL    MPV 7.7 5.0 - 10.5 fL    Neutrophils % 59.5 %    Lymphocytes % 30.4 %    Monocytes % 5.9 %    Eosinophils % 3.4 %    Basophils % 0.8 %    Neutrophils # 5.2 1.7 - 7.7 K/uL    Lymphocytes # 2.7 1.0 - 5.1 K/uL    Monocytes # Chest: Visualized lung bases demonstrate subsegmental atelectasis/scarring. No cardiomegaly, pericardial or pleural effusion. Multiple similar appearing prominent retrocrural and periesophageal lymph nodes which demonstrate heterogeneous enhancement and calcification. Liver: Interval mild increase in size of a focal area of hypoattenuation in the subcapsular anterior and medial left hepatic lobe which measures up to 2.5 x 1.5 cm. Mildly increased focal area of hypoattenuation in the subcapsular posterior Paddock dome which measures up to 6.0 x 2.9 cm. Stable subcentimeter subcapsular hypodensity in the posterior inferior right hepatic lobe. Gallbladder and Bile Ducts: Normal. Spleen: Normal. Adrenal Glands: Normal. Pancreas: Normal. Genitourinary: Normal. Bowel: Few prominent loops of small bowel with wall thickening in the anterior abdomen. Postsurgical changes of the bowel. . Vasculature: Atherosclerosis. No abdominal aortic aneurysm. Bones and Soft Tissues: Large ventral hernia containing hazy mesenteric fat, multiple loops of small bowel and colon and loculated fluid. Some of the loops of small bowel demonstrate wall thickening with air-fluid levels and fecalization of contents. Retroperitoneum/Mesentery: Stable upper abdominal and retroperitoneal lymphadenopathy. Multiple small calcified retroperitoneal lymph nodes are similar to the prior study. Small amount of free fluid in the abdomen and pelvis is increased from the prior study. Free fluid in the posterior pelvis demonstrates fluid hematocrit level. Abnormal haziness and swirling in the anterior and central mesentery. Multiple prominent mesenteric lymph nodes are similar to the prior study. Interval increase in size of a peripherally enhancing fluid collection in the right pericolic gutter just inferior to the right hepatic lobe which measures 4.1 x 3.3 by 3.7 cm. This previously measured 3.0 x 3.0 x 2.5 cm.   Stable focal area of right rectus dysfunction. UA is without evidence for infection. No blood to suggest kidney stone. Lipase within normal limits. Patient is having difficulty maintaining pain control at home. He was given Dilaudid IV in the ER. CT of the abdomen and pelvis was obtained and shows large ventral hernia containing hazy mesenteric fat, multiple loops of small bowel in colon and loculated fluid. Overall the findings on CT scanner suggesting small bowel obstruction. The new lump to the left side may relate to new loculated fluid in the ventral hernia. Increased small amount of free fluid in the abdomen and pelvis. This is suggestive of a small amount of blood products. Increase in size of peripherally enhancing fluid collection in right pericolic gutter just inferior to the liver that could represent a small abscess. Focal areas of hypoattenuation in the subcapsular liver mildly increased from prior study, underlying neoplastic process is not excluded. Similar-appearing upper abdominal retroperitoneal lymphadenopathy. Patient's CT findings do appear to be consistent with incarcerated hernia but not strangulated. There is also bowel obstruction as per CT scan. Patient received multiple doses of IV pain medicine, ED attending Dr. Benitez Angeles did attempt manual reduction but was not successful. Patient to be admitted and ED attending spoke with general surgery.     Pt was given the following medications or treatments in the ED: ED Medication Orders   Hide   (From 05/06/19 0650 through 05/08/19 0451)   Start Ordered   Status Ordering Provider   05/08/19 0345 05/08/19 0336  HYDROmorphone (DILAUDID) injection 1 mg ONCE      Last MAR action: Given - by Zulema Knight on 05/08/19 at 0351 Carlos Gagnon Venson Route   05/08/19 0345 05/08/19 0341  LORazepam (ATIVAN) injection 2 mg ONCE      Last MAR action: Given - by Zulema Knight on 05/08/19 at 0353 Carlos Gagnon MARGE   05/08/19 0230 05/08/19 0216  lactated ringers infusion 1,000 mL ONCE      Last MAR action: Stopped - by Rod Lyles on 05/08/19 at 0343 Garima Willie MARGE   05/08/19 0005 05/08/19 0005  iopamidol (ISOVUE-370) 76 % injection 75 mL IMG ONCE PRN      Last MAR action: Given - by Dean Sims on 05/08/19 at 0056 Garima Nine Aleks Alphonse   05/07/19 2345 05/07/19 2336  HYDROmorphone (DILAUDID) injection 1 mg ONCE      Last MAR action: Given - by Edouard Moncada on 05/07/19 at 2350 STORM, [place] A   05/07/19 2345 05/07/19 2337  0.9 % sodium chloride bolus ONCE      Last MAR action: Stopped - by Edouard Moncada on 05/08/19 at 19 [facility] Talia will be admitted for further observation and evaluation of Dominick Yeung's abdominal pain. As I have deemed necessary from their history, physical and studies, I have considered and evaluated Thor Simmons for the following diagnoses:  ACUTE APPENDICITIS, BOWEL OBSTRUCTION, CHOLECYSTITIS, DIVERTICULITIS, INCARCERATED HERNIA, PANCREATITIS, or PERFORATED BOWEL or ULCER. CLINICAL IMPRESSION    1. Ventral hernia with obstruction and without gangrene    2. SBO (small bowel obstruction) ([address]    3. H/O colon cancer, stage IV    4. Constipation, unspecified constipation type    5. Chronic prescription opiate use    6. Non-intractable vomiting with nausea, unspecified vomiting type       DISPOSITION  Patient was admitted in stable condition. Comment: Pleasenote this report has been produced using speech recognition software and may contain errors related to that system including errors in grammar, punctuation, and spelling, as well as words and phrases that may beinappropriate. If there are any questions or concerns please feel free to contact the dictating provider for clarification.         SURYA Martínez - CNP  05/08/19 4685

## 2019-05-08 NOTE — ED PROVIDER NOTES
Emergency Department Provider Note     Location: [facility]  ED  5/7/2019    I independently performed a history and physical on Rosa Parker. All diagnostic, treatment, and disposition decisions were made by myself in conjunction with the mid-level provider. Briefly, this is a 37 y.o. male here for abdominal pain, states his ventral hernia is having pain and more swelling, had colon surgery for his stage IV colon cancer, now with metastasis to lymph nodes, he is on Keytruda currently as an experimental medication, no other current chemotherapy, he is on significant amounts of pain medications, gets constipated frequently, also has new onset nausea and vomiting , Dr. Lauren Hussein is his surgeon, sees Dr. Guillermo Brown for oncology    Vitals:    05/08/19 0245 05/08/19 0334 05/08/19 0354 05/08/19 0452   BP: 113/63 114/65 109/72 106/70   Pulse: 83 73 83 81   Resp: 16 14 14 16   Temp:    98.3 °F (36.8 °C)   TempSrc:    Oral   SpO2: 98% 98% 93% 93%   Weight:       Height:           Exam:  no acute distress, appears uncomfortable, A&Ox4, heart RRR, no M/G/R, lungs CTAB, resp. Nonlabored, generalized abdominal tenderness, much worse over the large bulging left ventral hernia, postsurgical scarring well healed, increased bowel sounds      Patient seen and examined. Ct Abdomen Pelvis W Iv Contrast Additional Contrast? None    Result Date: 5/8/2019  EXAMINATION: CT OF THE ABDOMEN AND PELVIS WITH CONTRAST 5/8/2019 12:05 am TECHNIQUE: CT of the abdomen and pelvis was performed with the administration of intravenous contrast. Multiplanar reformatted images are provided for review. Dose modulation, iterative reconstruction, and/or weight based adjustment of the mA/kV was utilized to reduce the radiation dose to as low as reasonably achievable. COMPARISON: February 4, 2019.  HISTORY: ORDERING SYSTEM PROVIDED HISTORY: abdominal pain, colon cancer, new \"lump\" on left side, known hernia right TECHNOLOGIST PROVIDED HISTORY: If patient is on cardiac monitor and/or pulse ox, they may be taken off cardiac monitor and pulse ox, left on O2 if currently on. All monitors reattached when patient returns to room. Additional Contrast?->None Ordering Physician Provided Reason for Exam: Abdominal Pain (is being treated for colon CA. 36hrs ago large solid lump appeared on left side of abd as well as severe abd pain. Tender if lump is touched. New onset N/V. feels constipated ) FINDINGS: Lower Chest: Visualized lung bases demonstrate subsegmental atelectasis/scarring. No cardiomegaly, pericardial or pleural effusion. Multiple similar appearing prominent retrocrural and periesophageal lymph nodes which demonstrate heterogeneous enhancement and calcification. Liver: Interval mild increase in size of a focal area of hypoattenuation in the subcapsular anterior and medial left hepatic lobe which measures up to 2.5 x 1.5 cm. Mildly increased focal area of hypoattenuation in the subcapsular posterior Paddock dome which measures up to 6.0 x 2.9 cm. Stable subcentimeter subcapsular hypodensity in the posterior inferior right hepatic lobe. Gallbladder and Bile Ducts: Normal. Spleen: Normal. Adrenal Glands: Normal. Pancreas: Normal. Genitourinary: Normal. Bowel: Few prominent loops of small bowel with wall thickening in the anterior abdomen. Postsurgical changes of the bowel. . Vasculature: Atherosclerosis. No abdominal aortic aneurysm. Bones and Soft Tissues: Large ventral hernia containing hazy mesenteric fat, multiple loops of small bowel and colon and loculated fluid. Some of the loops of small bowel demonstrate wall thickening with air-fluid levels and fecalization of contents. Retroperitoneum/Mesentery: Stable upper abdominal and retroperitoneal lymphadenopathy. Multiple small calcified retroperitoneal lymph nodes are similar to the prior study. Small amount of free fluid in the abdomen and pelvis is increased from the prior study.   Free fluid in the posterior pelvis demonstrates fluid hematocrit level. Abnormal haziness and swirling in the anterior and central mesentery. Multiple prominent mesenteric lymph nodes are similar to the prior study. Interval increase in size of a peripherally enhancing fluid collection in the right pericolic gutter just inferior to the right hepatic lobe which measures 4.1 x 3.3 by 3.7 cm. This previously measured 3.0 x 3.0 x 2.5 cm. Stable focal area of right rectus abdominal musculature nodular thickening, adjacent to the ventral hernia. Large ventral hernia containing hazy mesenteric fat, multiple loops of small bowel and colon and loculated fluid. Some of the loops of small bowel demonstrate wall thickening with air-fluid levels and fecalization of contents. Multiple loops of prominent small bowel in the anterior abdomen with associated mesenteric swirling. Overall, findings suggest small bowel obstruction. The new lump on the left side may relate to new loculated fluid in the patient's ventral hernia which was not seen on the prior study and is located along the left lateral aspect of the umbilicus. Increased small amount of free fluid in the abdomen and pelvis. Fluid in the posterior pelvis demonstrates a fluid hematocrit level suggestive of a small amount of blood products. Increase in size of a peripherally enhancing fluid collection in the right pericolic gutter just inferior to the liver which measures 4.1 x 3.3 cm, previously 3.0 x 3.0 cm. This could represent a small abscess. Focal areas of hypoattenuation in the subcapsular liver are mildly increased from the prior study. Underlying neoplastic process is not excluded. Similar-appearing upper abdominal and retroperitoneal lymphadenopathy.        Results for orders placed or performed during the hospital encounter of 05/07/19   CBC Auto Differential   Result Value Ref Range    WBC 8.8 4.0 - 11.0 K/uL    RBC 4.59 4.20 - 5.90 M/uL    Hemoglobin 14.0 13.5 - 17.5 g/dL    Hematocrit 40.8 40.5 - 52.5 %    MCV 88.8 80.0 - 100.0 fL    MCH 30.5 26.0 - 34.0 pg    MCHC 34.4 31.0 - 36.0 g/dL    RDW 13.7 12.4 - 15.4 %    Platelets 633 679 - 739 K/uL    MPV 7.7 5.0 - 10.5 fL    Neutrophils % 59.5 %    Lymphocytes % 30.4 %    Monocytes % 5.9 %    Eosinophils % 3.4 %    Basophils % 0.8 %    Neutrophils # 5.2 1.7 - 7.7 K/uL    Lymphocytes # 2.7 1.0 - 5.1 K/uL    Monocytes # 0.5 0.0 - 1.3 K/uL    Eosinophils # 0.3 0.0 - 0.6 K/uL    Basophils # 0.1 0.0 - 0.2 K/uL   Comprehensive Metabolic Panel   Result Value Ref Range    Sodium 134 (L) 136 - 145 mmol/L    Potassium 3.5 3.5 - 5.1 mmol/L    Chloride 98 (L) 99 - 110 mmol/L    CO2 27 21 - 32 mmol/L    Anion Gap 9 3 - 16    Glucose 101 (H) 70 - 99 mg/dL    BUN 8 7 - 20 mg/dL    CREATININE 0.7 (L) 0.9 - 1.3 mg/dL    GFR Non-African American >60 >60    GFR African American >60 >60    Calcium 9.0 8.3 - 10.6 mg/dL    Total Protein 7.2 6.4 - 8.2 g/dL    Alb 4.0 3.4 - 5.0 g/dL    Albumin/Globulin Ratio 1.3 1.1 - 2.2    Total Bilirubin 0.5 0.0 - 1.0 mg/dL    Alkaline Phosphatase 85 40 - 129 U/L    ALT 19 10 - 40 U/L    AST 16 15 - 37 U/L    Globulin 3.2 g/dL   Lipase   Result Value Ref Range    Lipase 40.0 13.0 - 60.0 U/L   Urinalysis, reflex to microscopic   Result Value Ref Range    Color, UA Yellow Straw/Yellow    Clarity, UA Clear Clear    Glucose, Ur Negative Negative mg/dL    Bilirubin Urine Negative Negative    Ketones, Urine Negative Negative mg/dL    Specific Gravity, UA <=1.005 1.005 - 1.030    Blood, Urine Negative Negative    pH, UA 7.0 5.0 - 8.0    Protein, UA Negative Negative mg/dL    Urobilinogen, Urine 0.2 <2.0 E.U./dL    Nitrite, Urine Negative Negative    Leukocyte Esterase, Urine Negative Negative    Microscopic Examination Not Indicated     Urine Type Not Specified          For further details of Dominick Yeung's emergency department encounter, please see Jimmy DEBORAH Landrum's documentation.   75-year-old male with abdominal pain, very large ventral hernia with inflamed/edematous bowel, appears to be incarcerated, but not strangulated at this point, still soft, with bowel sounds, but he does have some evidence of bowel obstruction on CT, has required multiple doses of pain medication, made n.p.o., applied ice to the hernia site to help reduce edema, I attempted manual reduction without success, and Zofran improved nausea, given fluids and started maintenance, I spoke to Dr. Kenan Littlejohn with hospitalist who agreed to admit,  I do not feel this is an emergent surgical consult, thus it can wait until later in the morning. I have paged gen surg and plan to update them on patient. Orders Placed This Encounter   Procedures    CT ABDOMEN PELVIS W IV CONTRAST Additional Contrast? None    CBC Auto Differential    Comprehensive Metabolic Panel    Lipase    Urinalysis, reflex to microscopic    Diet NPO Effective Now    Apply ice to affected area    Inpatient consult to Hospitalist    PATIENT STATUS (FROM ED OR OR/PROCEDURAL) Inpatient     Orders Placed This Encounter   Medications    HYDROmorphone (DILAUDID) injection 1 mg    HYDROmorphone (DILAUDID) injection 0.5 mg    ondansetron (ZOFRAN) injection 4 mg    0.9 % sodium chloride bolus    iopamidol (ISOVUE-370) 76 % injection 75 mL    lactated ringers infusion 1,000 mL    HYDROmorphone (DILAUDID) injection 1 mg    LORazepam (ATIVAN) injection 2 mg    LORazepam (ATIVAN) 2 MG/ML injection     Rita Fulling: cecilia override        Clinical Impression:  1. Ventral hernia with obstruction and without gangrene    2. SBO (small bowel obstruction) ([address]    3. H/O colon cancer, stage IV    4. Constipation, unspecified constipation type    5. Chronic prescription opiate use    6.  Non-intractable vomiting with nausea, unspecified vomiting type          This chart was generated in part by using Dragon Dictation system and may contain errors related to that system including errors in

## 2019-05-08 NOTE — ED NOTES
Provided warm blanket and ice bag for patient at this time. Patient watching tv with wife at bedside.  Call light in reach and iv infusing at this time     Bharath RooneyWashington [facility]  05/08/19 0433

## 2019-05-08 NOTE — PROGRESS NOTES
Paged Dr. Ally Dallas, [facility] consult from dr. Dav Avelar.  \"hernia, SBO, Dr. Ally Dallas patient, have them call [phone]\"

## 2019-05-08 NOTE — PROGRESS NOTES
4 Eyes Skin Assessment     The patient is being assess for  Admission    I agree that 2 RN's have performed a thorough Head to Toe Skin Assessment on the patient. ALL assessment sites listed below have been assessed. Areas assessed by both nurses: brook    [x]   Head, Face, and Ears   [x]   Shoulders, Back, and Chest  [x]   Arms, Elbows, and Hands   [x]   Coccyx, Sacrum, and IschIum  [x]   Legs, Feet, and Heels        Does the Patient have Skin Breakdown?   No         Kevin Prevention initiated:  No   Wound Care Orders initiated:  No      [facility] nurse consulted for Pressure Injury (Stage 3,4, Unstageable, DTI, NWPT, and Complex wounds), New and Established Ostomies:  No      Nurse 1 eSignature: Electronically signed by Katie Carson RN on 5/8/19 at 5:07 AM    **SHARE this note so that the co-signing nurse is able to place an eSignature**    Nurse 2 eSignature: {Esignature:[id number]}

## 2019-05-08 NOTE — PROGRESS NOTES
Wasted 1 mg of dilaudid with [facility], RN.

## 2019-05-08 NOTE — PLAN OF CARE
Pt remained free of falls. Call light within reach. A/ox4, calls out appropriately. Ambulates independently. Non skid footwear in place. Bed locked and in lowest position. Will continue to monitor.

## 2019-05-09 LAB
ALBUMIN SERPL-MCNC: 3.4 G/DL (ref 3.4–5)
ANION GAP SERPL CALCULATED.3IONS-SCNC: 10 MMOL/L (ref 3–16)
BASOPHILS ABSOLUTE: 0.1 K/UL (ref 0–0.2)
BASOPHILS RELATIVE PERCENT: 0.7 %
BUN BLDV-MCNC: 6 MG/DL (ref 7–20)
CALCIUM SERPL-MCNC: 8.5 MG/DL (ref 8.3–10.6)
CHLORIDE BLD-SCNC: 104 MMOL/L (ref 99–110)
CO2: 25 MMOL/L (ref 21–32)
CREAT SERPL-MCNC: 0.6 MG/DL (ref 0.9–1.3)
EOSINOPHILS ABSOLUTE: 0.3 K/UL (ref 0–0.6)
EOSINOPHILS RELATIVE PERCENT: 3.6 %
GFR AFRICAN AMERICAN: >60
GFR NON-AFRICAN AMERICAN: >60
GLUCOSE BLD-MCNC: 129 MG/DL (ref 70–99)
HCT VFR BLD CALC: 36.5 % (ref 40.5–52.5)
HEMOGLOBIN: 12.6 G/DL (ref 13.5–17.5)
LYMPHOCYTES ABSOLUTE: 2.1 K/UL (ref 1–5.1)
LYMPHOCYTES RELATIVE PERCENT: 27.3 %
MCH RBC QN AUTO: 31.3 PG (ref 26–34)
MCHC RBC AUTO-ENTMCNC: 34.6 G/DL (ref 31–36)
MCV RBC AUTO: 90.5 FL (ref 80–100)
MONOCYTES ABSOLUTE: 0.4 K/UL (ref 0–1.3)
MONOCYTES RELATIVE PERCENT: 5.1 %
NEUTROPHILS ABSOLUTE: 4.8 K/UL (ref 1.7–7.7)
NEUTROPHILS RELATIVE PERCENT: 63.3 %
PDW BLD-RTO: 13.5 % (ref 12.4–15.4)
PHOSPHORUS: 4.2 MG/DL (ref 2.5–4.9)
PLATELET # BLD: 219 K/UL (ref 135–450)
PMV BLD AUTO: 7.4 FL (ref 5–10.5)
POTASSIUM SERPL-SCNC: 3.9 MMOL/L (ref 3.5–5.1)
RBC # BLD: 4.03 M/UL (ref 4.2–5.9)
SODIUM BLD-SCNC: 139 MMOL/L (ref 136–145)
WBC # BLD: 7.6 K/UL (ref 4–11)

## 2019-05-09 PROCEDURE — 6360000002 HC RX W HCPCS: Performed by: SURGERY

## 2019-05-09 PROCEDURE — 1200000000 HC SEMI PRIVATE

## 2019-05-09 PROCEDURE — 80069 RENAL FUNCTION PANEL: CPT

## 2019-05-09 PROCEDURE — 85025 COMPLETE CBC W/AUTO DIFF WBC: CPT

## 2019-05-09 PROCEDURE — 99232 SBSQ HOSP IP/OBS MODERATE 35: CPT | Performed by: SURGERY

## 2019-05-09 PROCEDURE — 6360000002 HC RX W HCPCS: Performed by: INTERNAL MEDICINE

## 2019-05-09 PROCEDURE — 6370000000 HC RX 637 (ALT 250 FOR IP): Performed by: INTERNAL MEDICINE

## 2019-05-09 PROCEDURE — 2580000003 HC RX 258: Performed by: INTERNAL MEDICINE

## 2019-05-09 RX ORDER — MORPHINE SULFATE 30 MG/1
30 TABLET, FILM COATED, EXTENDED RELEASE ORAL EVERY 12 HOURS SCHEDULED
Status: DISCONTINUED | OUTPATIENT
Start: 2019-05-09 | End: 2019-05-10 | Stop reason: HOSPADM

## 2019-05-09 RX ORDER — OXYCODONE HYDROCHLORIDE 5 MG/1
5 TABLET ORAL EVERY 4 HOURS PRN
Status: DISCONTINUED | OUTPATIENT
Start: 2019-05-09 | End: 2019-05-10 | Stop reason: HOSPADM

## 2019-05-09 RX ORDER — HYDROMORPHONE HCL 110MG/55ML
1.5 PATIENT CONTROLLED ANALGESIA SYRINGE INTRAVENOUS
Status: DISCONTINUED | OUTPATIENT
Start: 2019-05-09 | End: 2019-05-10 | Stop reason: HOSPADM

## 2019-05-09 RX ADMIN — HYDROMORPHONE HYDROCHLORIDE 1 MG: 2 INJECTION, SOLUTION INTRAMUSCULAR; INTRAVENOUS; SUBCUTANEOUS at 04:47

## 2019-05-09 RX ADMIN — HYDROMORPHONE HYDROCHLORIDE 1 MG: 2 INJECTION, SOLUTION INTRAMUSCULAR; INTRAVENOUS; SUBCUTANEOUS at 02:32

## 2019-05-09 RX ADMIN — SODIUM CHLORIDE: 9 INJECTION, SOLUTION INTRAVENOUS at 13:32

## 2019-05-09 RX ADMIN — Medication 10 ML: at 11:23

## 2019-05-09 RX ADMIN — SODIUM CHLORIDE: 9 INJECTION, SOLUTION INTRAVENOUS at 03:56

## 2019-05-09 RX ADMIN — SODIUM CHLORIDE: 9 INJECTION, SOLUTION INTRAVENOUS at 22:43

## 2019-05-09 RX ADMIN — HYDROMORPHONE HYDROCHLORIDE 1 MG: 2 INJECTION, SOLUTION INTRAMUSCULAR; INTRAVENOUS; SUBCUTANEOUS at 00:27

## 2019-05-09 RX ADMIN — HYDROMORPHONE HYDROCHLORIDE 1.5 MG: 2 INJECTION, SOLUTION INTRAMUSCULAR; INTRAVENOUS; SUBCUTANEOUS at 22:40

## 2019-05-09 RX ADMIN — HYDROMORPHONE HYDROCHLORIDE 1 MG: 2 INJECTION, SOLUTION INTRAMUSCULAR; INTRAVENOUS; SUBCUTANEOUS at 06:46

## 2019-05-09 RX ADMIN — HYDROMORPHONE HYDROCHLORIDE 1 MG: 2 INJECTION, SOLUTION INTRAMUSCULAR; INTRAVENOUS; SUBCUTANEOUS at 08:32

## 2019-05-09 RX ADMIN — HYDROMORPHONE HYDROCHLORIDE 1.5 MG: 2 INJECTION, SOLUTION INTRAMUSCULAR; INTRAVENOUS; SUBCUTANEOUS at 15:41

## 2019-05-09 RX ADMIN — HYDROMORPHONE HYDROCHLORIDE 1.5 MG: 2 INJECTION, SOLUTION INTRAMUSCULAR; INTRAVENOUS; SUBCUTANEOUS at 18:25

## 2019-05-09 RX ADMIN — MORPHINE SULFATE 30 MG: 30 TABLET, FILM COATED, EXTENDED RELEASE ORAL at 10:17

## 2019-05-09 RX ADMIN — Medication 10 ML: at 20:28

## 2019-05-09 RX ADMIN — OXYCODONE HYDROCHLORIDE 5 MG: 5 TABLET ORAL at 17:23

## 2019-05-09 RX ADMIN — HYDROMORPHONE HYDROCHLORIDE 1.5 MG: 2 INJECTION, SOLUTION INTRAMUSCULAR; INTRAVENOUS; SUBCUTANEOUS at 13:26

## 2019-05-09 RX ADMIN — HYDROMORPHONE HYDROCHLORIDE 1.5 MG: 2 INJECTION, SOLUTION INTRAMUSCULAR; INTRAVENOUS; SUBCUTANEOUS at 11:22

## 2019-05-09 RX ADMIN — HYDROMORPHONE HYDROCHLORIDE 1.5 MG: 2 INJECTION, SOLUTION INTRAMUSCULAR; INTRAVENOUS; SUBCUTANEOUS at 20:25

## 2019-05-09 RX ADMIN — MORPHINE SULFATE 30 MG: 30 TABLET, FILM COATED, EXTENDED RELEASE ORAL at 21:39

## 2019-05-09 ASSESSMENT — PAIN SCALES - GENERAL
PAINLEVEL_OUTOF10: 10
PAINLEVEL_OUTOF10: 8
PAINLEVEL_OUTOF10: 6
PAINLEVEL_OUTOF10: 8
PAINLEVEL_OUTOF10: 10
PAINLEVEL_OUTOF10: 10
PAINLEVEL_OUTOF10: 9
PAINLEVEL_OUTOF10: 10
PAINLEVEL_OUTOF10: 6
PAINLEVEL_OUTOF10: 10
PAINLEVEL_OUTOF10: 10
PAINLEVEL_OUTOF10: 8
PAINLEVEL_OUTOF10: 6
PAINLEVEL_OUTOF10: 8
PAINLEVEL_OUTOF10: 6
PAINLEVEL_OUTOF10: 6

## 2019-05-09 ASSESSMENT — PAIN DESCRIPTION - PAIN TYPE
TYPE: ACUTE PAIN

## 2019-05-09 ASSESSMENT — PAIN DESCRIPTION - LOCATION
LOCATION: ABDOMEN
LOCATION: ABDOMEN

## 2019-05-09 ASSESSMENT — PAIN DESCRIPTION - ORIENTATION
ORIENTATION: LOWER
ORIENTATION: LOWER

## 2019-05-09 NOTE — PLAN OF CARE
Problem: Pain:  Goal: Patient's pain/discomfort is manageable  Description  Patient's pain/discomfort is manageable  Outcome: Ongoing  Goal: Pain level will decrease  Description  Pain level will decrease  Outcome: Ongoing  Goal: Control of acute pain  Description  Control of acute pain  Outcome: Ongoing  Goal: Control of chronic pain  Description  Control of chronic pain  Outcome: Ongoing

## 2019-05-09 NOTE — PROGRESS NOTES
Pt alert and orientated. Call light within reach. Pt is very upset. Pain medication is not working. 1 mg of Dilaudid given for pain. Message out to Dr. Scar Goldman and waiting for response. Tejal

## 2019-05-09 NOTE — PLAN OF CARE
(disorder)   Pneumonia (disorder)   Postoperative intra-abdominal abscess (disorder)   Preoperative state (finding)   Right lower quadrant pain (finding)   Secondary malignant neoplasm of omentum (disorder)   Sepsis (disorder)   Sepsis (disorder)   Smoker (finding)   Smoker (finding)   Suprapubic pain (finding)   Tobacco user (finding)   Wound seroma (finding)    HPI  Metastatic colon cancer:  1. Laparoscopic-assisted extended right colectomy, omentectomy, 5/28/2015, with Dr. Aidan Mott. 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 12 pos LNs out of 14 LNs. Omental biopsy was positive. 2. Imaging at diagnosis:   A. CT abd/pelvis, 5/24/2015, showed an inflammatory process in the RLQ involving the terminal ileum, appendix, ileocecal valve, cecum, ascending colon. Meldly enlarge reactive LNs were noted. B. CT chest, 5/27/2015, showed ground glass opacities in the upper lobes favoered to be post inflammatory. 3. Port placed, 6/25/2015, with Dr. Jignesh Sinha. 4. Received FOLFOX/Avastin x 8 cycles between 7/07 - 10/12/2015. Treatment was stopped after a CT scan on 10/01/2015 showed progression of LNs in the lower mediastinum, retrocrucral space, LUQ of the abdomen. PET/CT on 10/15/2015 confirmed progression. 5. Received FOLFIRI/Avastin x 6 cycles between 11/02/2015 - 1/11/2016. Treatment was stopped when a PET/CT on 1/21/2016 showed continued progression of his scattered LNs. 6. Received Irinotecan/Panitumumab x cycles between 2/01 - 4/11/2016. Treatment was stopped after a PET/CT on 4/21/2016 showed continued progression of his scattered LNs. 7. Iliana Murdock robotic lysis of adhesions; open lysis of adhesions with resection of ileocolic anastomosis; creation of stapled new ileocolic  anastomosis; primary incisional hernia repair, 7/06/2016, with Dr. Jignesh Sinha.  Surgery was complicated by an abscess which required drainage and delayed initiation of to be due to gastritis. Continued PPI. Recommended alcohol cessation. A CT scan on 2/04/2019 showed an interval decrease in a right internal mammary, mediastinal, hilar, and peritoneal lymph nodes/nodules. One right pericolic gutter lymph node was larger, however. Because of this we switched gray to every 3 week dosing of his Pembrolizumab. Here today for Pemborlizumab #30. Needs a refill of his pain meds for 4/10. He will be headed out of town to vacation in [place]. He will be back in time for a treatment on 4/22 (4 weeks rather than 3 weeks). Review of Systems  Per interval history; otherwise 10 point ROS is negative   Vital Signs  Blood pressure: 124/88, R arm, Regular, Pulse: 84, Temperature: 98.1 F, Respirations: 12, O2 sat: , Pain Scale: 4, Height: 70 in, Weight: 222 lb, BSA: 2.23, BMI: 31.85 kg/m2   Physical Exam   CONSTITUTIONAL: awake, alert, cooperative, no apparent distress   EYES: pupils equal, round and reactive to light, sclera clear and conjunctiva normal   ENT: Normocephalic, without obvious abnormality, atraumatic   NECK: supple, symmetrical, no jugular venous distension and no carotid bruits   HEMATOLOGIC/LYMPHATIC: no cervical, supraclavicular or axillary lymphadenopathy   LUNGS: no increased work of breathing and clear to auscultation   CARDIOVASCULAR: regular rate and rhythm, normal S1 and S2, no murmur noted   ABDOMEN: normal bowel sounds x 4, soft, non-distended, non-tender, no masses palpated, no hepatosplenomegaly   MUSCULOSKELETAL: full range of motion noted, tone is normal   NEUROLOGIC: awake, alert, oriented to name, place and time. Motor skills grossly intact. SKIN: Normal skin color, texture, turgor and no jaundice.  appears intact   EXTREMITIES: no LE edema   Labs  CBC   Lab Results 04/22/2019 03/25/2019 03/04/2019 02/11/2019 01/14/2019 12/18/2018   CBC                     WBC x 10^3/uL 6.6 6.9 6.5 7.2 7.0 7.9   RBC x 10^6/uL 4.99 4.68 4.83 4.94 4.96 5.12   HGB g/dL 14.9 14.3 14.7 15.2 14.9 15.4   HCT % 44.7 42.5 44.2 44.7 45.5 46.3   MCV fL 89.6 90.8 91.5 90.5 91.7 90.4   MCH pg 29.9 30.6 30.4 30.8 30.0 30.1   MCHC g/dL 33.3 33.6 33.3 34.0 32.7 33.3   RDW-CV, % 13.1 13.1 13.1 13.4 13.8 13.3   PLT x 10^3/uL 165.0 168.0 169.0 213.0 221.0 180.0   Abi % 57.3 60.3 47.7 49.1 53.8 50.3   LY % 30.5 29.0 38.1 35.8 35.5 36.9   MO % 7.0 6.1 7.4 8.0 6.0 5.1 (L)   EO % 4.7 4.2 6.0 6.3 4.3 6.9   BA % 0.5 0.4 0.8 0.8 0.4 0.8   Abi # (ANC) x 10^3/uL 3.75 4.15 3.08 3.51 3.75 3.95   LY # x 10^3/uL 2.00 2.00 2.46 2.56 2.48 2.90   MO # x 10^3/uL 0.46 0.42 0.48 0.57 0.42 0.40   EO # x 10^3/uL 0.31 0.29 0.39 0.45 0.30 0.54   BA # x 10^3/uL 0.03 0.03 0.05 0.06 0.03 0.06   CMP   Lab Results 04/22/2019 03/25/2019 03/04/2019 02/11/2019 01/14/2019 12/18/2018   Chemistries                     Glucose mg/dL    102 108 98 99 92   BUN mg/dL    8 (L) 7 9 11 11   Creatinine mg/dL    0.75 1.0 0.73 0.77 0.76   BUN/Creatinine ratio    11    12 14 14   Sodium mmol/L    135 (L) 142 142 141 143   Potassium mmol/L    3.6 3.9 4.3 4.4 4.6   Chloride mmol/L    102 104 108 (H) 106 107   CO2 mmol/L    27 28 23 22 25   Anion gap, mmol/L    6    11 13 11   Calcium mg/dL    8.7 10.2 8.4 9.3 9.2   Albumin g/dL    3.6 3.5 3.7 3.6 3.8   Total protein g/dL    6.7 7.9 6.7 6.9 7.4   Globulin g/dL    3.1    3.0 3.3 3.6   A/G ratio    1    1 1 1   Bilirubin, total mg/dL    0.5 0.6 0.2 0.4 0.4   Alkaline phosphatase U/L    86 100 87 93 84   AST/SGOT U/L    24 30 26 29 25   ALT/SGPT U/L    20 35 26 24 21   GFR non-African American, estimated mL/min/1.73m2    115 >60.0 118 112 114   GFR , estimated mL/min/1.73m2    139 >60.0 143 135 137     Lab Results 04/22/2019 03/25/2019 03/04/2019 02/11/2019 01/14/2019 12/18/2018   [employer]                     Imaging  CTA PULMONARY W CONTRAST (5/25/2018): Impression  No evidence of pulmonary embolism or acute pulmonary abnormality.     Indeterminate left upper quadrant/subphrenic Was screened; Outcome positive: No; Screening Date: 01/14/2019; Screening Tool: Patient Health Questionnaire (PHQ9)   Psycho-social PHQ-9 Follow-up Plan (if applicable):   Assessment & Plan  1. Metastatic colon cancer, extended KAMI wild-type - Status post extensive prior therapy as outlined above. Started on Pembrolizumab on on 3/30/2017. On 9/12/2017, Dr. Wali Avery recommended switching to every 4 week dosing to minimize his infusions. A CT scan on 2/04/2019 showed mostly improved/stable disease but a single LN in the right pericolic gutter was enlarged. Because of this we switched back to every 3 week Pembrolizumab. I also asked that he make another appointment at [address] [place] [address] with Dr. Wali Avery or one of his partners. Here for cycle #30. Has acceptable counts. Will proceed. 2. Cancer-associated pain - Takes Morphine ER 30 mg q 12 hr # 60 and Oxycodone 10 mg #120. Refilled today  3. Possible gastric ulcer - Identified on CT scan performed on 5/25/2018. Could not tolerate an EGD with Dr. Maryana Ward due to anxiety. Treating with Protonix 40 mg BID and recommending alcohol cessation. 4. Plan - Give Pembrolizumab #30 today. RTO 3 weeks for cycle #31 Follow up with Dr. Wali Avery or one of his partners at [address] [place] [address] for his input (the patient states that he can call for an appointment). Would scan again in 2 months. .  Recent imaging and labs were reviewed and discussed with the patient.      Sang Louis CNP       Electronically signed by Eleazar Manriquez MD 04/23/2019 09:05

## 2019-05-09 NOTE — CONSULTS
Hematology/Oncology Consultation         Patient:   Cande Tijerina       Reason for Consult:  Established patient with Stage IV colon cancer   Requesting Physician: No ref. provider found    Chief Complaint:     Chief Complaint   Patient presents with    Abdominal Pain     is being treated for colon CA. 36hrs ago large solid lump appeared on left side of abd as well as severe abd pain. Tender if lump is touched. New onset N/V. feels constipated                   History Obtained from:     patient, electronic medical record    History of Present Illness:     Cande Tijerina is a 37 y.o. male  with significant past medical history of stage IV colon cancer on immunotherapy, alcoholism, and hx of pancreatitis,  who presents with nausea/vomiting and abdominal pain on 5/8. He was last seen in the office on 4/22 and given cycle 30 Keytrua, due every 21 days with Cycle 31 planned for 5/13. CT A/P shows a ventral hernia. Gen surgery is following and planning on hernia repair. Patient is hesitant to stay for surgery and would like to go home first. Pain is largely being controlled by Dilaudid IVP. We are consulted for coordination of care in regards to anti-cancer treatment and impending surgery. His pain is currently well controlled. No family in room. He is moving his bowels normally. No nausea or vomiting. No cough. Hopes to go home tomorrow after seeing Dr. Leon Ga.      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    abscess, ABD       Past Surgical History:         Procedure Laterality Date    COLON SURGERY  05/2015    COLONOSCOPY  2015    bx polyps    HERNIA REPAIR      HYDROCELE EXCISION Right 04/25/2018    OTHER SURGICAL HISTORY  05/28/2015    lap converted to open right Comment: RARELY, was a heavy drinker    Drug use: Yes     Types: Marijuana     Comment: STOPPED 1/2018    Sexual activity: Not on file   Lifestyle    Physical activity:     Days per week: Not on file     Minutes per session: Not on file    Stress: Not on file   Relationships    Social connections:     Talks on phone: Not on file     Gets together: Not on file     Attends [employer] service: Not on file     Active member of club or organization: Not on file     Attends meetings of clubs or organizations: Not on file     Relationship status: Not on file    Intimate partner violence:     Fear of current or ex partner: Not on file     Emotionally abused: Not on file     Physically abused: Not on file     Forced sexual activity: Not on file   Other Topics Concern    Not on file   Social History Narrative    Not on file     Social History     Substance and Sexual Activity   Drug Use Yes    Types: Marijuana    Comment: STOPPED 1/2018     Social History     Substance and Sexual Activity   Alcohol Use Yes    Comment: RARELY, was a heavy drinker     Social History     Substance and Sexual Activity   Sexual Activity Not on file     Social History     Tobacco Use   Smoking Status Current Every Day Smoker    Packs/day: 0.50    Years: 10.00    Pack years: 5.00    Types: Cigarettes   Smokeless Tobacco Never Used   Tobacco Comment    currently smoking 1/2 ppd       Family History:     History reviewed. No pertinent family history. Review of Systems:     Constitutional: Denies fever, sweats, weight loss. .     Eyes: No visual changes or diplopia. No scleral icterus. ENT: No Headaches, no hearing loss, no  vertigo. No mouth sores or sore throat. Cardiovascular: No chest pain, no dyspnea on exertion, no palpitations, no  loss of consciousness. Respiratory: No cough, no  wheezing, no dyspnea, no sputum production. No hemoptysis. .    Gastrointestinal: SEE HPI   Genitourinary: No dysuria, trouble voiding, or hematuria. Musculoskeletal:  Generalized weakness. No joint complaints. Integumentary: No rash or pruritis. Neurological: No headache, diplopia. No change in gait, balance, or coordination. No paresthesias. Endocrine: No temperature intolerance. No excessive thirst, fluid intake, or urination. Hematologic/Lymphatic: No abnormal bruising or ecchymoses, no blood clots or swollen lymph nodes. Allergic/Immunologic: No nasal congestion or hives. Physical Exam:     /74   Pulse 83   Temp 98.2 °F (36.8 °C) (Oral)   Resp 20   Ht 5' 10\" (1.778 m)   Wt 210 lb (95.3 kg)   SpO2 96%   BMI 30.13 kg/m²     CONSTITUTIONAL: awake, alert, cooperative, no apparent distress. EYES:  Pupils equal, round and reactive to light, sclera non-icteric, conjunctiva normal  ENT:  Normocephalic, without obvious abnormality, atraumatic, sinuses nontender on palpation, external ears without lesions, oral pharynx with moist mucus membranes, no mucositis. NECK:  Supple, symmetrical, trachea midline, no adenopathy, thyroid symmetric, not enlarged and no tenderness, skin normal  HEMATOLOGIC/LYMPHATICS:  no cervical lymphadenopathy, no supraclavicular lymphadenopathy, no axillary lymphadenopathy and no inguinal lymphadenopathy  BACK:  Symmetric, no curvature, spinous processes are non-tender on palpation, paraspinous muscles are non-tender on palpation, no costal vertebral tenderness  LUNGS:  Clear to auscultation bilaterally, no crackles or wheezing  CARDIOVASCULAR:  Regular rate and rhythm, normal S1 and S2, no S3 or S4, and no murmur noted  ABDOMEN:  Normal bowel sounds, soft, non-distended, non-tender, no masses palpated, no hepatosplenomegally, + central hernia   MUSCULOSKELETAL:  There is no redness, warmth, or swelling of the joints  NEUROLOGIC:   No focal findings. SKIN:  Scattered bruising and ecchymoses. EXT: without clubbing, cyanosis or edema.       Data:     CBC:  Recent Labs     05/09/19  0542 05/08/19  0535 05/07/19  2255   WBC 7.6 7.0 8.8   HGB 12.6* 13.5 14.0   HCT 36.5* 39.3* 40.8   MCV 90.5 89.8 88.8    215 261       BMP:  Recent Labs     05/09/19  0542 05/08/19  0535 05/07/19  2255    138 134*   K 3.9 3.5 3.5   CO2 25 27 27   BUN 6* 7 8   CREATININE 0.6* 0.6* 0.7*       HEPATIC:  Recent Labs     05/07/19  2255   AST 16   ALT 19   ALKPHOS 85   PROT 7.2   BILITOT 0.5       TUMOR MARKERS:  No results for input(s): PSA, CEA, , [id number],  in the last 720 hours.       MAGNESIUM:    Lab Results   Component Value Date    MG 2.00 06/13/2018       PT/INR:    No results found for: PTINR    Lab Results   Component Value Date    INR 1.27 09/17/2016       PTT:    No results found for: APTT    U/A:    Lab Results   Component Value Date    NITRITE neg 05/30/2013    COLORU Yellow 05/08/2019    PHUR 7.0 05/08/2019    LABCAST 1-3 Hyaline 09/08/2016    WBCUA 0-2 09/08/2016    RBCUA 3-5 09/08/2016    BACTERIA 1+ 08/18/2016    CLARITYU Clear 05/08/2019    SPECGRAV <=1.005 05/08/2019    LEUKOCYTESUR Negative 05/08/2019    UROBILINOGEN 0.2 05/08/2019    BILIRUBINUR Negative 05/08/2019    BILIRUBINUR neg 05/30/2013    BLOODU Negative 05/08/2019    GLUCOSEU Negative 05/08/2019    AMORPHOUS 4+ 08/18/2016       Imaging:     Large ventral hernia containing hazy mesenteric fat, multiple loops of small   bowel and colon and loculated fluid.  Some of the loops of small bowel   demonstrate wall thickening with air-fluid levels and fecalization of   contents.  Multiple loops of prominent small bowel in the anterior abdomen   with associated mesenteric swirling.  Overall, findings suggest small bowel   obstruction.       The new lump on the left side may relate to new loculated fluid in the   patient's ventral hernia which was not seen on the prior study and is located   along the left lateral aspect of the umbilicus.       Increased small amount of free fluid in the abdomen and pelvis.  Fluid in the   posterior pelvis demonstrates a fluid hematocrit level suggestive of a small   amount of blood products.       Increase in size of a peripherally enhancing fluid collection in the right   pericolic gutter just inferior to the liver which measures 4.1 x 3.3 cm,   previously 3.0 x 3.0 cm.  This could represent a small abscess.       Focal areas of hypoattenuation in the subcapsular liver are mildly increased   from the prior study. Karin Neigh neoplastic process is not excluded.       Similar-appearing upper abdominal and retroperitoneal lymphadenopathy. Impression:     Stage IV Colon CA  Intractable Pain   Ventral Hernia     Plan:     Stage IV Colorectal CA (has failed numerous lines of therapy)  -Plans for Cycle 31 Keytruda on Monday, May 13th. We will do this outpatient if and when discharged. Ventral Hernia  - needing surgical repair per Dr. Hillary Sheldon     Pain   - Dilaudid PRN , Oxy IR and home MS Contin   - should improve after hernia repair     Will have Dr. Maxi Goodrich see in the AM.     Thank you very much for allowing me to participate in the care of this patient.     Carlos Waldrop CNP   Medical Oncology/Hematology    [facility] - [address],  [place]   Phone: [phone]  Fax: [phone]

## 2019-05-09 NOTE — PROGRESS NOTES
Consult PerfectServed/called to [facility] on 05/09/19 at 9:42 AM Latha Galeana

## 2019-05-09 NOTE — PROGRESS NOTES
Talked with Dr. Ru Jara about patient concerns with surgery and that he is scared of surgery again. Pain is unbearable, patient is pacing the room stating he usually gets 2mg of Dilaudid every 2 hours. Dr. Felix Marrero was paged as well for pain control. Dr. Ru Jara was ok with increasing Dilaudid to 1.5 mg IVP if Dr. Felix Marrero did not increase Dilaudid dose. Patient informed and agreeable to this pain regimen. Reassured patient that Dr. Ru Jara has his best interest in mind. Wants to think about the surgery and make arrangement for .

## 2019-05-09 NOTE — PLAN OF CARE
Pt reports 8/10 pain. Dilaudid is given Q2 per MAR. Pt reports this is the only thing helping him right now and states it controls his pain until the next dose. Will continue to monitor.

## 2019-05-09 NOTE — PROGRESS NOTES
Shift assessment completed. Pt A&O, VSS. Pt taking Dilaudid Q2 and states that is the only thing helping him right now. Provided pt with an ice pack for his abdomen. Pt is calm, pleasant, and cooperative. Denies any needs at this time. Bed locked and in lowest position, call light within reach, side rails up 2/4. Will continue to monitor.

## 2019-05-09 NOTE — PROGRESS NOTES
[facility] SURGERY    PATIENT NAME: Charly Galicia     TODAY'S DATE: 5/9/2019    CHIEF COMPLAINT: worsening pain    INTERVAL HISTORY/HPI:    Pt notes increased abdominal pain at his hernia site overnight. He ate solid food yesterday and has been passing gas, denies N/V.     REVIEW OF SYSTEMS:  Pertinent positives and negatives as per interval history section    OBJECTIVE:  VITALS:  /78   Pulse 73   Temp 97.8 °F (36.6 °C) (Oral)   Resp 18   Ht 5' 10\" (1.778 m)   Wt 210 lb (95.3 kg)   SpO2 96%   BMI 30.13 kg/m²     INTAKE/OUTPUT:    I/O last 3 completed shifts: In: 2452 [P.O.:240; I.V.:2212]  Out: -   No intake/output data recorded. CONSTITUTIONAL:  fatigued and alert  LUNGS:  Respirations easy and unlabored, clear to auscultation  CARD:  regular rate and rhythm  ABDOMEN:  , soft, non-distended, broad R-sided hernia w/ tenderness at the medial aspect of the bulge     Data:  CBC:   Recent Labs     05/07/19 2255 05/08/19 0535 05/09/19  0542   WBC 8.8 7.0 7.6   HGB 14.0 13.5 12.6*   HCT 40.8 39.3* 36.5*    215 219     BMP:    Recent Labs     05/07/19 2255 05/08/19 0535 05/09/19  0542   * 138 139   K 3.5 3.5 3.9   CL 98* 104 104   CO2 27 27 25   BUN 8 7 6*   CREATININE 0.7* 0.6* 0.6*   GLUCOSE 101* 103* 129*     Hepatic:   Recent Labs     05/07/19 2255   AST 16   ALT 19   BILITOT 0.5   ALKPHOS 85     Mag:    No results for input(s): MG in the last 72 hours. Phos:     Recent Labs     05/08/19 0535 05/09/19 0542   PHOS 4.4 4.2      INR: No results for input(s): INR in the last 72 hours. Radiology Review:  *Imaging personally reviewed by me. ASSESSMENT AND PLAN:  Worsening incisional hernia   - I suspect pt will need surgical intervention soon; however, he is insistent that he must go home to make arrangements with his family before having any surgery. I doubt he will last long at home, but if he chooses to leave. ..   - If he does leave, we will look for time for surgery as soon as possible, probably next week.     [address] [facility]   [id number]

## 2019-05-09 NOTE — PROGRESS NOTES
Hospitalist Progress Note      PCP: No primary care provider on file. Date of Admission: 5/7/2019    Chief Complaint: abdominal pain    Hospital Course:   37 y.o. male who presented to [facility] with 3 days of abdominal pain. He often has right sided pain with his existing hernia, but now has developed left sided pain and a new swelling in his abdomen. PMHx sig for stage 4 colon ca on keytruda.      His main is periumbilical, sharp in nature. It is intermittent and occasionally wakes him up from sleep. He has been unable to keep any food or liquid down. He is still having BMs which he says are normal, non bloody, non melanotic. He is passing gas.      He denies chest pain, SOB, fevers, chills. Subjective: Stilling having very sharp abdominal pain at hernia site. No new complaints. Medications:  Reviewed    Infusion Medications    sodium chloride 100 mL/hr at 05/09/19 1332     Scheduled Medications    morphine  30 mg Oral 2 times per day    sodium chloride flush  10 mL Intravenous 2 times per day     PRN Meds: oxyCODONE, HYDROmorphone, sodium chloride flush, magnesium hydroxide, ondansetron      Intake/Output Summary (Last 24 hours) at 5/9/2019 1341  Last data filed at 5/9/2019 1328  Gross per 24 hour   Intake 3064 ml   Output --   Net 3064 ml       Physical Exam Performed:    /74   Pulse 83   Temp 98.2 °F (36.8 °C) (Oral)   Resp 20   Ht 5' 10\" (1.778 m)   Wt 210 lb (95.3 kg)   SpO2 94%   BMI 30.13 kg/m²     General appearance:  No apparent distress, appears stated age and cooperative. HEENT:  Normal cephalic, atraumatic without obvious deformity. Pupils equal, round, and reactive to light. Extra ocular muscles intact. Conjunctivae/corneas clear. Respiratory:  Normal respiratory effort. Clear to auscultation, bilaterally without Rales/Wheezes/Rhonchi. Cardiovascular:  Regular rate and rhythm with normal S1/S2 without murmurs, rubs or gallops.   Abdomen: Soft, tender to palpation, palpable hernias present. Musculoskeletal:  No clubbing, cyanosis or edema bilaterally. Skin: Skin color, texture, turgor normal.  Warm and dry  Neurologic:  Neurovascularly intact without any focal sensory/motor deficits. Cranial nerves: II-XII intact, grossly non-focal.  Psychiatric:  Alert and oriented, thought content appropriate, normal insight  Capillary Refill: Brisk,< 3 seconds   Peripheral Pulses: +2 palpable, equal bilaterally       Labs:   Recent Labs     05/07/19 2255 05/08/19  0535 05/09/19  0542   WBC 8.8 7.0 7.6   HGB 14.0 13.5 12.6*   HCT 40.8 39.3* 36.5*    215 219     Recent Labs     05/07/19 2255 05/08/19 0535 05/09/19  0542   * 138 139   K 3.5 3.5 3.9   CL 98* 104 104   CO2 27 27 25   BUN 8 7 6*   CREATININE 0.7* 0.6* 0.6*   CALCIUM 9.0 8.5 8.5   PHOS  --  4.4 4.2     Recent Labs     05/07/19 2255   AST 16   ALT 19   BILITOT 0.5   ALKPHOS 85     No results for input(s): INR in the last 72 hours. No results for input(s): Deidra Dank in the last 72 hours. Urinalysis:      Lab Results   Component Value Date    NITRU Negative 05/08/2019    WBCUA 0-2 09/08/2016    BACTERIA 1+ 08/18/2016    RBCUA 3-5 09/08/2016    BLOODU Negative 05/08/2019    SPECGRAV <=1.005 05/08/2019    GLUCOSEU Negative 05/08/2019       Radiology:  CT ABDOMEN PELVIS W IV CONTRAST Additional Contrast? None   Final Result   Large ventral hernia containing hazy mesenteric fat, multiple loops of small   bowel and colon and loculated fluid. Some of the loops of small bowel   demonstrate wall thickening with air-fluid levels and fecalization of   contents. Multiple loops of prominent small bowel in the anterior abdomen   with associated mesenteric swirling. Overall, findings suggest small bowel   obstruction.       The new lump on the left side may relate to new loculated fluid in the   patient's ventral hernia which was not seen on the prior study and is located   along the left lateral aspect of the umbilicus. Increased small amount of free fluid in the abdomen and pelvis. Fluid in the   posterior pelvis demonstrates a fluid hematocrit level suggestive of a small   amount of blood products. Increase in size of a peripherally enhancing fluid collection in the right   pericolic gutter just inferior to the liver which measures 4.1 x 3.3 cm,   previously 3.0 x 3.0 cm. This could represent a small abscess. Focal areas of hypoattenuation in the subcapsular liver are mildly increased   from the prior study. Underlying neoplastic process is not excluded. Similar-appearing upper abdominal and retroperitoneal lymphadenopathy. Assessment/Plan:    Active Hospital Problems    Diagnosis Date Noted    Bowel obstruction [facility]) [B69.243] 05/08/2019    Ventral hernia with obstruction and without gangrene [K43.6]      Ventral hernia with partial bowel obstruction  - Noted on CT abd/pel  - had prior ventral hernia repair but has recurred  - increased pain control  - Surgery consulted  - Needs hernia repair but wants to schedule as an outpatient despite severe level of pain. - no evidence of bowel ischemia currently    Stage IV colon cancer  - largely appears stable  - continue to follow up as an outpatient. DVT Prophylaxis: SCDs  Diet: DIET LOW FIBER;  Code Status: Full Code    PT/OT Eval Status: not ordered    Dispo - Needs surgery but currently refusing.  Possible DC home tomorrow following discussion with Dr. Andria Mcclure MD

## 2019-05-10 VITALS
BODY MASS INDEX: 30.06 KG/M2 | WEIGHT: 210 LBS | HEIGHT: 70 IN | SYSTOLIC BLOOD PRESSURE: 125 MMHG | HEART RATE: 76 BPM | RESPIRATION RATE: 20 BRPM | DIASTOLIC BLOOD PRESSURE: 76 MMHG | OXYGEN SATURATION: 93 % | TEMPERATURE: 98.4 F

## 2019-05-10 LAB
ALBUMIN SERPL-MCNC: 3.7 G/DL (ref 3.4–5)
ANION GAP SERPL CALCULATED.3IONS-SCNC: 11 MMOL/L (ref 3–16)
BASOPHILS ABSOLUTE: 0.1 K/UL (ref 0–0.2)
BASOPHILS RELATIVE PERCENT: 1.2 %
BUN BLDV-MCNC: 8 MG/DL (ref 7–20)
CALCIUM SERPL-MCNC: 8.5 MG/DL (ref 8.3–10.6)
CHLORIDE BLD-SCNC: 105 MMOL/L (ref 99–110)
CO2: 25 MMOL/L (ref 21–32)
CREAT SERPL-MCNC: 0.7 MG/DL (ref 0.9–1.3)
EOSINOPHILS ABSOLUTE: 0.3 K/UL (ref 0–0.6)
EOSINOPHILS RELATIVE PERCENT: 3.7 %
GFR AFRICAN AMERICAN: >60
GFR NON-AFRICAN AMERICAN: >60
GLUCOSE BLD-MCNC: 113 MG/DL (ref 70–99)
HCT VFR BLD CALC: 37 % (ref 40.5–52.5)
HEMOGLOBIN: 12.6 G/DL (ref 13.5–17.5)
LYMPHOCYTES ABSOLUTE: 2 K/UL (ref 1–5.1)
LYMPHOCYTES RELATIVE PERCENT: 24.6 %
MCH RBC QN AUTO: 30.7 PG (ref 26–34)
MCHC RBC AUTO-ENTMCNC: 34.2 G/DL (ref 31–36)
MCV RBC AUTO: 89.7 FL (ref 80–100)
MONOCYTES ABSOLUTE: 0.4 K/UL (ref 0–1.3)
MONOCYTES RELATIVE PERCENT: 4.3 %
NEUTROPHILS ABSOLUTE: 5.4 K/UL (ref 1.7–7.7)
NEUTROPHILS RELATIVE PERCENT: 66.2 %
PDW BLD-RTO: 13.4 % (ref 12.4–15.4)
PHOSPHORUS: 3.9 MG/DL (ref 2.5–4.9)
PLATELET # BLD: 227 K/UL (ref 135–450)
PMV BLD AUTO: 7.4 FL (ref 5–10.5)
POTASSIUM SERPL-SCNC: 3.5 MMOL/L (ref 3.5–5.1)
RBC # BLD: 4.12 M/UL (ref 4.2–5.9)
SODIUM BLD-SCNC: 141 MMOL/L (ref 136–145)
WBC # BLD: 8.2 K/UL (ref 4–11)

## 2019-05-10 PROCEDURE — 6370000000 HC RX 637 (ALT 250 FOR IP): Performed by: INTERNAL MEDICINE

## 2019-05-10 PROCEDURE — 2580000003 HC RX 258: Performed by: INTERNAL MEDICINE

## 2019-05-10 PROCEDURE — 6360000002 HC RX W HCPCS: Performed by: SURGERY

## 2019-05-10 PROCEDURE — 85025 COMPLETE CBC W/AUTO DIFF WBC: CPT

## 2019-05-10 PROCEDURE — 80069 RENAL FUNCTION PANEL: CPT

## 2019-05-10 PROCEDURE — 36591 DRAW BLOOD OFF VENOUS DEVICE: CPT

## 2019-05-10 RX ADMIN — HYDROMORPHONE HYDROCHLORIDE 1.5 MG: 2 INJECTION, SOLUTION INTRAMUSCULAR; INTRAVENOUS; SUBCUTANEOUS at 05:54

## 2019-05-10 RX ADMIN — HYDROMORPHONE HYDROCHLORIDE 1.5 MG: 2 INJECTION, SOLUTION INTRAMUSCULAR; INTRAVENOUS; SUBCUTANEOUS at 01:28

## 2019-05-10 RX ADMIN — HYDROMORPHONE HYDROCHLORIDE 1.5 MG: 2 INJECTION, SOLUTION INTRAMUSCULAR; INTRAVENOUS; SUBCUTANEOUS at 03:38

## 2019-05-10 RX ADMIN — HYDROMORPHONE HYDROCHLORIDE 1.5 MG: 2 INJECTION, SOLUTION INTRAMUSCULAR; INTRAVENOUS; SUBCUTANEOUS at 08:13

## 2019-05-10 RX ADMIN — MORPHINE SULFATE 30 MG: 30 TABLET, FILM COATED, EXTENDED RELEASE ORAL at 09:17

## 2019-05-10 RX ADMIN — Medication 10 ML: at 10:14

## 2019-05-10 RX ADMIN — HYDROMORPHONE HYDROCHLORIDE 1.5 MG: 2 INJECTION, SOLUTION INTRAMUSCULAR; INTRAVENOUS; SUBCUTANEOUS at 10:13

## 2019-05-10 ASSESSMENT — PAIN SCALES - GENERAL
PAINLEVEL_OUTOF10: 6
PAINLEVEL_OUTOF10: 7
PAINLEVEL_OUTOF10: 6

## 2019-05-10 ASSESSMENT — PAIN DESCRIPTION - PAIN TYPE: TYPE: ACUTE PAIN

## 2019-05-10 ASSESSMENT — PAIN DESCRIPTION - LOCATION: LOCATION: ABDOMEN

## 2019-05-10 NOTE — DISCHARGE INSTR - DIET

## 2019-05-10 NOTE — PLAN OF CARE
Problem: Safety:  Goal: Free from accidental physical injury  Description  Free from accidental physical injury  5/10/2019 1256 by Yarely Valentin RN  Outcome: Ongoing  5/10/2019 1239 by Yarely Valentin RN  Outcome: Ongoing

## 2019-05-10 NOTE — PROGRESS NOTES
Pt very upset this morning about pain medication not working. Nurse manager in room to talk with patient. Tejal

## 2019-05-10 NOTE — PROGRESS NOTES
Pt verbalized discharge instructions. [facility] [place]

## 2019-05-10 NOTE — PROGRESS NOTES
Pt a/o. Pt stated no nausea; no emesis. Pt ate % of dinner. Rated pain 6/10--pain medication given per MAR. Pt ambulated hallways. Bed locked in lowest position; side rails 2/4; call light within reach; will continue to monitor.

## 2019-05-10 NOTE — PROGRESS NOTES
Hospitalist Progress Note      PCP: No primary care provider on file. Date of Admission: 5/7/2019    Chief Complaint: abdominal pain    Hospital Course:   37 y.o. male who presented to [facility] with 3 days of abdominal pain. He often has right sided pain with his existing hernia, but now has developed left sided pain and a new swelling in his abdomen. PMHx sig for stage 4 colon ca on keytruda.      His main is periumbilical, sharp in nature. It is intermittent and occasionally wakes him up from sleep. He has been unable to keep any food or liquid down. He is still having BMs which he says are normal, non bloody, non melanotic. He is passing gas.      He denies chest pain, SOB, fevers, chills. Subjective: Stilling having very sharp abdominal pain at hernia site. No new complaints. Medications:  Reviewed    Infusion Medications    sodium chloride 100 mL/hr at 05/09/19 2243     Scheduled Medications    morphine  30 mg Oral 2 times per day    sodium chloride flush  10 mL Intravenous 2 times per day     PRN Meds: oxyCODONE, HYDROmorphone, sodium chloride flush, magnesium hydroxide, ondansetron      Intake/Output Summary (Last 24 hours) at 5/10/2019 0955  Last data filed at 5/10/2019 0438  Gross per 24 hour   Intake 1458.33 ml   Output --   Net 1458.33 ml       Physical Exam Performed:    /85   Pulse 94   Temp 98.8 °F (37.1 °C) (Oral)   Resp 16   Ht 5' 10\" (1.778 m)   Wt 210 lb (95.3 kg)   SpO2 95%   BMI 30.13 kg/m²     General appearance:  No apparent distress, appears stated age and cooperative. HEENT:  Normal cephalic, atraumatic without obvious deformity. Pupils equal, round, and reactive to light. Extra ocular muscles intact. Conjunctivae/corneas clear. Respiratory:  Normal respiratory effort. Clear to auscultation, bilaterally without Rales/Wheezes/Rhonchi. Cardiovascular:  Regular rate and rhythm with normal S1/S2 without murmurs, rubs or gallops.   Abdomen: Soft, tender aspect of the umbilicus. Increased small amount of free fluid in the abdomen and pelvis. Fluid in the   posterior pelvis demonstrates a fluid hematocrit level suggestive of a small   amount of blood products. Increase in size of a peripherally enhancing fluid collection in the right   pericolic gutter just inferior to the liver which measures 4.1 x 3.3 cm,   previously 3.0 x 3.0 cm. This could represent a small abscess. Focal areas of hypoattenuation in the subcapsular liver are mildly increased   from the prior study. Underlying neoplastic process is not excluded. Similar-appearing upper abdominal and retroperitoneal lymphadenopathy. Assessment/Plan:    Active Hospital Problems    Diagnosis Date Noted    Bowel obstruction [facility]) [H34.914] 05/08/2019    Ventral hernia with obstruction and without gangrene [K43.6]      Ventral hernia with partial bowel obstruction  - Noted on CT abd/pel  - had prior ventral hernia repair but has recurred  - increased pain control  - Surgery consulted  - Needs hernia repair but wants to schedule as an outpatient despite severe level of pain. - no evidence of bowel ischemia currently but at very high risk for this  - Patient acknowledges risk of delaying surgery but is insistent on being discharged home to get his affairs in order prior to surgery. Stage IV colon cancer  - largely appears stable  - continue to follow up as an outpatient. - Hem/Onc following    DVT Prophylaxis: SCDs  Diet: DIET LOW FIBER;  Code Status: Full Code    PT/OT Eval Status: not ordered    Dispo - Needs surgery but currently refusing. If surgery OK with plan, will discharge patient. Will need surgery scheduled for likely early next week.     Chepe Wei MD

## 2019-05-10 NOTE — DISCHARGE SUMMARY
Hospital Medicine Discharge Summary    Patient ID: Latrelle Merlin      Patient's PCP: No primary care provider on file. Admit Date: 5/7/2019     Discharge Date:   05/10/19    Admitting Physician: Kelby Rick MD     Discharge Physician: Sandra Peter MD     Discharge Diagnoses: Active Hospital Problems    Diagnosis Date Noted    Bowel obstruction [facility]) [T12.548] 05/08/2019    Ventral hernia with obstruction and without gangrene [K43.6]        The patient was seen and examined on day of discharge and this discharge summary is in conjunction with any daily progress note from day of discharge. Hospital Course:   37 y. o. male who presented to [facility] with 3 days of abdominal pain. He often has right sided pain with his existing hernia, but now has developed left sided pain and a new swelling in his abdomen. PMHx sig for stage 4 colon ca on keytruda.      His main is periumbilical, sharp in nature. It is intermittent and occasionally wakes him up from sleep. He has been unable to keep any food or liquid down. He is still having BMs which he says are normal, non bloody, non melanotic. He is passing gas.      He denies chest pain, SOB, fevers, chills.     Ventral hernia with partial bowel obstruction  - Noted on CT abd/pel  - had prior ventral hernia repair but has recurred  - increased pain control  - Surgery consulted  - Needs hernia repair but wants to schedule as an outpatient despite severe level of pain. - no evidence of bowel ischemia currently but at very high risk for this  - Patient acknowledges risk of delaying surgery but is insistent on being discharged home to get his affairs in order prior to surgery.     Stage IV colon cancer  - largely appears stable  - continue to follow up as an outpatient.   - Hem/Onc following    Physical Exam Performed:     /76   Pulse 82   Temp 98.5 °F (36.9 °C) (Oral)   Resp 16   Ht 5' 10\" (1.778 m)   Wt 210 lb (95.3 kg)   SpO2 95% BMI 30.13 kg/m²       General appearance:  No apparent distress, appears stated age and cooperative. HEENT:  Normal cephalic, atraumatic without obvious deformity. Pupils equal, round, and reactive to light.  Extra ocular muscles intact. Conjunctivae/corneas clear. Respiratory:  Normal respiratory effort. Clear to auscultation, bilaterally without Rales/Wheezes/Rhonchi. Cardiovascular:  Regular rate and rhythm with normal S1/S2 without murmurs, rubs or gallops. Abdomen: Soft, tender to palpation, palpable hernias present. Musculoskeletal:  No clubbing, cyanosis or edema bilaterally.    Skin: Skin color, texture, turgor normal.  Warm and dry  Neurologic:  Neurovascularly intact without any focal sensory/motor deficits. Cranial nerves: II-XII intact, grossly non-focal.  Psychiatric:  Alert and oriented, thought content appropriate, normal insight  Capillary Refill: Brisk,< 3 seconds   Peripheral Pulses: +2 palpable, equal bilaterally       Labs: For convenience and continuity at follow-up the following most recent labs are provided:      CBC:    Lab Results   Component Value Date    WBC 8.2 05/10/2019    HGB 12.6 05/10/2019    HCT 37.0 05/10/2019     05/10/2019       Renal:    Lab Results   Component Value Date     05/10/2019    K 3.5 05/10/2019    K 4.1 06/11/2018     05/10/2019    CO2 25 05/10/2019    BUN 8 05/10/2019    CREATININE 0.7 05/10/2019    CALCIUM 8.5 05/10/2019    PHOS 3.9 05/10/2019         Significant Diagnostic Studies    Radiology:   CT ABDOMEN PELVIS W IV CONTRAST Additional Contrast? None   Final Result   Large ventral hernia containing hazy mesenteric fat, multiple loops of small   bowel and colon and loculated fluid. Some of the loops of small bowel   demonstrate wall thickening with air-fluid levels and fecalization of   contents. Multiple loops of prominent small bowel in the anterior abdomen   with associated mesenteric swirling.   Overall, findings suggest small bowel obstruction. The new lump on the left side may relate to new loculated fluid in the   patient's ventral hernia which was not seen on the prior study and is located   along the left lateral aspect of the umbilicus. Increased small amount of free fluid in the abdomen and pelvis. Fluid in the   posterior pelvis demonstrates a fluid hematocrit level suggestive of a small   amount of blood products. Increase in size of a peripherally enhancing fluid collection in the right   pericolic gutter just inferior to the liver which measures 4.1 x 3.3 cm,   previously 3.0 x 3.0 cm. This could represent a small abscess. Focal areas of hypoattenuation in the subcapsular liver are mildly increased   from the prior study. Underlying neoplastic process is not excluded. Similar-appearing upper abdominal and retroperitoneal lymphadenopathy. Consults:     IP CONSULT TO HOSPITALIST  IP CONSULT TO GENERAL SURGERY  IP CONSULT TO SPIRITUAL SERVICES  IP CONSULT TO GENERAL SURGERY  IP CONSULT TO HEM/ONC    Disposition:  home     Condition at Discharge: Stable    Discharge Instructions/Follow-up:  Follow up with Surgery early next week regarding ventral hernia repair    Code Status:  Full Code     Activity: activity as tolerated    Diet: Low fiber diet      Discharge Medications:     Current Discharge Medication List           Details   oxyCODONE HCl (OXY-IR) 10 MG immediate release tablet 1 po q 4 h prn pain. Qty: 120 tablet, Refills: 0      morphine (MS CONTIN) 30 MG extended release tablet Take 1 tablet by mouth 2 times daily . Qty: 60 tablet, Refills: 0             Time Spent on discharge is more than 30 minutes in the examination, evaluation, counseling and review of medications and discharge plan. Signed:    Raúl Enamorado MD   5/10/2019      Thank you No primary care provider on file. for the opportunity to be involved in this patient's care.  If you have any questions or concerns please feel free to contact me at [phone].

## 2019-05-10 NOTE — PROGRESS NOTES
Pt set on going home today (5/10). Pt rating pain 5/6 out of 10. Pt has been consistent w/ receiving Dilaudid. This RN discussed the importance of getting off Dilaudid in order to be discharged home. Pt stated he was \"aware of all of this\" and will \"figure it out. \" Will continue to monitor.

## 2019-05-10 NOTE — ONCOLOGY
05/08/19  0535 05/09/19  0542 05/10/19  0515    139 141   K 3.5 3.9 3.5    104 105   CO2 27 25 25   PHOS 4.4 4.2 3.9   BUN 7 6* 8   CREATININE 0.6* 0.6* 0.7*     Recent Labs     05/07/19  2255   AST 16   ALT 19   BILITOT 0.5   ALKPHOS 85       Magnesium:    Lab Results   Component Value Date    MG 2.00 06/13/2018    MG 1.80 09/18/2016    MG 1.80 09/17/2016         Problem List  Patient Active Problem List   Diagnosis    Pneumonia    BANDEMIA    LFT elevation    Smoker    Alcohol consumption heavy    Sepsis ([address]    Bilateral pneumonia    Acute respiratory failure (HCC)    Pneumonia    Abdominal pain    Colitis    Ileus ([address]    Alcohol dependence ([address]    Alcohol withdrawal ([address]    Tobacco abuse    Postoperative wound seroma    Encounter for long-term (current) use of high-risk medication    Cancer of right colon ([address]    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[address]    Encounter for smoking cessation counseling    Incisional hernia without obstruction or gangrene    Postoperative intra-abdominal abscess    Abdominal abscess    Abscess of abdominal wall    Sepsis ([address]    Postoperative abscess    VRE (vancomycin-resistant Enterococci) infection    Delirium due to another medical condition    Adjustment disorder with anxious mood    Epigastric pain    Acute pancreatitis    Leukocytosis    Suprapubic pain    Right lower quadrant abdominal pain    Pain management    Chronic alcoholic pancreatitis (HCC)    Bowel obstruction (HCC)    Ventral hernia with obstruction and without gangrene       ASSESSMENT AND PLAN    Stage IV Colorectal CA (has failed numerous lines of therapy)  -Plans for Cycle 31 Keytruda on Monday, May 13th. We will do this outpatient if and when discharged or can find a way to do this in the postop setting if he is an inpatient.   This should not impair wound healing and should not immunosupress him.     Ventral Hernia  - needing surgical repair per Dr. Kathryn Wolf    Pain   - Dilaudid PRN , Oxy IR and home MS Contin   - should improve after hernia repair       ONCOLOGIC DISPOSITION:  Will discuss with Dr. Jess Branch defer to his judgement.       Gail Thornton MD  Please contact through [address]

## 2019-05-10 NOTE — PLAN OF CARE
Problem: Safety:  Goal: Free from accidental physical injury  Description  Free from accidental physical injury  Outcome: Ongoing  Goal: Free from intentional harm  Description  Free from intentional harm  Outcome: Ongoing

## 2019-05-10 NOTE — CARE COORDINATION
Patient has no needs for DCP/Case management . He is being discharged and will follow up regarding need for surgery as an outpatient per discussion with Dr. Ashvin Solis.

## 2019-05-12 ENCOUNTER — HOSPITAL ENCOUNTER (INPATIENT)
Age: 39
LOS: 1 days | Discharge: LEFT BEFORE DISCHARGE | DRG: 139 | End: 2019-05-13
Attending: EMERGENCY MEDICINE | Admitting: INTERNAL MEDICINE
Payer: MEDICARE

## 2019-05-12 ENCOUNTER — APPOINTMENT (OUTPATIENT)
Dept: CT IMAGING | Age: 39
DRG: 139 | End: 2019-05-12
Payer: MEDICARE

## 2019-05-12 DIAGNOSIS — J18.9 PNEUMONIA DUE TO ORGANISM: Primary | ICD-10-CM

## 2019-05-12 DIAGNOSIS — C18.9 MALIGNANT NEOPLASM OF COLON, UNSPECIFIED PART OF COLON (HCC): ICD-10-CM

## 2019-05-12 DIAGNOSIS — R10.9 ABDOMINAL PAIN, UNSPECIFIED ABDOMINAL LOCATION: ICD-10-CM

## 2019-05-12 LAB
A/G RATIO: 1.1 (ref 1.1–2.2)
ALBUMIN SERPL-MCNC: 4.4 G/DL (ref 3.4–5)
ALP BLD-CCNC: 133 U/L (ref 40–129)
ALT SERPL-CCNC: 34 U/L (ref 10–40)
ANION GAP SERPL CALCULATED.3IONS-SCNC: 13 MMOL/L (ref 3–16)
AST SERPL-CCNC: 32 U/L (ref 15–37)
BASOPHILS ABSOLUTE: 0.1 K/UL (ref 0–0.2)
BASOPHILS RELATIVE PERCENT: 0.5 %
BILIRUB SERPL-MCNC: 0.9 MG/DL (ref 0–1)
BUN BLDV-MCNC: 9 MG/DL (ref 7–20)
CALCIUM SERPL-MCNC: 9.7 MG/DL (ref 8.3–10.6)
CHLORIDE BLD-SCNC: 98 MMOL/L (ref 99–110)
CO2: 26 MMOL/L (ref 21–32)
CREAT SERPL-MCNC: 0.8 MG/DL (ref 0.9–1.3)
EOSINOPHILS ABSOLUTE: 0.3 K/UL (ref 0–0.6)
EOSINOPHILS RELATIVE PERCENT: 2.3 %
GFR AFRICAN AMERICAN: >60
GFR NON-AFRICAN AMERICAN: >60
GLOBULIN: 4.1 G/DL
GLUCOSE BLD-MCNC: 146 MG/DL (ref 70–99)
HCT VFR BLD CALC: 44 % (ref 40.5–52.5)
HEMOGLOBIN: 15 G/DL (ref 13.5–17.5)
INR BLD: 1.17 (ref 0.86–1.14)
LACTIC ACID: 1.7 MMOL/L (ref 0.4–2)
LIPASE: 66 U/L (ref 13–60)
LYMPHOCYTES ABSOLUTE: 2.4 K/UL (ref 1–5.1)
LYMPHOCYTES RELATIVE PERCENT: 22 %
MCH RBC QN AUTO: 30.6 PG (ref 26–34)
MCHC RBC AUTO-ENTMCNC: 34.1 G/DL (ref 31–36)
MCV RBC AUTO: 89.6 FL (ref 80–100)
MONOCYTES ABSOLUTE: 0.4 K/UL (ref 0–1.3)
MONOCYTES RELATIVE PERCENT: 3.8 %
NEUTROPHILS ABSOLUTE: 7.7 K/UL (ref 1.7–7.7)
NEUTROPHILS RELATIVE PERCENT: 71.4 %
PDW BLD-RTO: 13.6 % (ref 12.4–15.4)
PLATELET # BLD: 343 K/UL (ref 135–450)
PMV BLD AUTO: 7.5 FL (ref 5–10.5)
POTASSIUM SERPL-SCNC: 3.8 MMOL/L (ref 3.5–5.1)
PROTHROMBIN TIME: 13.3 SEC (ref 9.8–13)
RBC # BLD: 4.91 M/UL (ref 4.2–5.9)
SODIUM BLD-SCNC: 137 MMOL/L (ref 136–145)
TOTAL PROTEIN: 8.5 G/DL (ref 6.4–8.2)
WBC # BLD: 10.8 K/UL (ref 4–11)

## 2019-05-12 PROCEDURE — 85025 COMPLETE CBC W/AUTO DIFF WBC: CPT

## 2019-05-12 PROCEDURE — 74177 CT ABD & PELVIS W/CONTRAST: CPT

## 2019-05-12 PROCEDURE — 83605 ASSAY OF LACTIC ACID: CPT

## 2019-05-12 PROCEDURE — 2580000003 HC RX 258: Performed by: EMERGENCY MEDICINE

## 2019-05-12 PROCEDURE — 99285 EMERGENCY DEPT VISIT HI MDM: CPT

## 2019-05-12 PROCEDURE — 96375 TX/PRO/DX INJ NEW DRUG ADDON: CPT

## 2019-05-12 PROCEDURE — 84484 ASSAY OF TROPONIN QUANT: CPT

## 2019-05-12 PROCEDURE — 83690 ASSAY OF LIPASE: CPT

## 2019-05-12 PROCEDURE — 85610 PROTHROMBIN TIME: CPT

## 2019-05-12 PROCEDURE — 6360000004 HC RX CONTRAST MEDICATION: Performed by: EMERGENCY MEDICINE

## 2019-05-12 PROCEDURE — 83880 ASSAY OF NATRIURETIC PEPTIDE: CPT

## 2019-05-12 PROCEDURE — 96361 HYDRATE IV INFUSION ADD-ON: CPT

## 2019-05-12 PROCEDURE — 80053 COMPREHEN METABOLIC PANEL: CPT

## 2019-05-12 PROCEDURE — 6360000002 HC RX W HCPCS: Performed by: EMERGENCY MEDICINE

## 2019-05-12 RX ORDER — LORAZEPAM 2 MG/ML
0.5 INJECTION INTRAMUSCULAR ONCE
Status: COMPLETED | OUTPATIENT
Start: 2019-05-12 | End: 2019-05-12

## 2019-05-12 RX ORDER — 0.9 % SODIUM CHLORIDE 0.9 %
1000 INTRAVENOUS SOLUTION INTRAVENOUS ONCE
Status: COMPLETED | OUTPATIENT
Start: 2019-05-12 | End: 2019-05-13

## 2019-05-12 RX ORDER — ONDANSETRON 2 MG/ML
4 INJECTION INTRAMUSCULAR; INTRAVENOUS ONCE
Status: COMPLETED | OUTPATIENT
Start: 2019-05-12 | End: 2019-05-12

## 2019-05-12 RX ADMIN — HYDROMORPHONE HYDROCHLORIDE 1 MG: 1 INJECTION, SOLUTION INTRAMUSCULAR; INTRAVENOUS; SUBCUTANEOUS at 23:36

## 2019-05-12 RX ADMIN — IOPAMIDOL 75 ML: 755 INJECTION, SOLUTION INTRAVENOUS at 22:43

## 2019-05-12 RX ADMIN — ONDANSETRON 4 MG: 2 INJECTION INTRAMUSCULAR; INTRAVENOUS at 22:14

## 2019-05-12 RX ADMIN — LORAZEPAM 0.5 MG: 2 INJECTION, SOLUTION INTRAMUSCULAR; INTRAVENOUS at 23:36

## 2019-05-12 RX ADMIN — SODIUM CHLORIDE 1000 ML: 9 INJECTION, SOLUTION INTRAVENOUS at 22:13

## 2019-05-12 RX ADMIN — HYDROMORPHONE HYDROCHLORIDE 1 MG: 1 INJECTION, SOLUTION INTRAMUSCULAR; INTRAVENOUS; SUBCUTANEOUS at 22:11

## 2019-05-12 ASSESSMENT — PAIN SCALES - GENERAL
PAINLEVEL_OUTOF10: 10
PAINLEVEL_OUTOF10: 10
PAINLEVEL_OUTOF10: 9

## 2019-05-12 ASSESSMENT — PAIN DESCRIPTION - ORIENTATION: ORIENTATION: MID;LOWER;RIGHT;LEFT

## 2019-05-12 ASSESSMENT — PAIN DESCRIPTION - DESCRIPTORS: DESCRIPTORS: CONSTANT

## 2019-05-12 ASSESSMENT — PAIN DESCRIPTION - PAIN TYPE
TYPE: ACUTE PAIN
TYPE: CHRONIC PAIN

## 2019-05-12 ASSESSMENT — PAIN DESCRIPTION - PROGRESSION: CLINICAL_PROGRESSION: GRADUALLY WORSENING

## 2019-05-12 ASSESSMENT — PAIN DESCRIPTION - FREQUENCY: FREQUENCY: CONTINUOUS

## 2019-05-12 ASSESSMENT — PAIN DESCRIPTION - LOCATION
LOCATION: ABDOMEN
LOCATION: ABDOMEN

## 2019-05-12 ASSESSMENT — PAIN DESCRIPTION - ONSET: ONSET: ON-GOING

## 2019-05-13 ENCOUNTER — APPOINTMENT (OUTPATIENT)
Dept: GENERAL RADIOLOGY | Age: 39
DRG: 139 | End: 2019-05-13
Payer: MEDICARE

## 2019-05-13 VITALS
DIASTOLIC BLOOD PRESSURE: 64 MMHG | OXYGEN SATURATION: 90 % | TEMPERATURE: 98 F | HEIGHT: 70 IN | HEART RATE: 86 BPM | RESPIRATION RATE: 18 BRPM | SYSTOLIC BLOOD PRESSURE: 97 MMHG | WEIGHT: 221.34 LBS | BODY MASS INDEX: 31.69 KG/M2

## 2019-05-13 PROBLEM — E66.9 OBESITY: Status: ACTIVE | Noted: 2019-05-13

## 2019-05-13 LAB
ANION GAP SERPL CALCULATED.3IONS-SCNC: 10 MMOL/L (ref 3–16)
BASOPHILS ABSOLUTE: 0 K/UL (ref 0–0.2)
BASOPHILS RELATIVE PERCENT: 0.2 %
BILIRUBIN URINE: NEGATIVE
BLOOD, URINE: NEGATIVE
BUN BLDV-MCNC: 8 MG/DL (ref 7–20)
CALCIUM SERPL-MCNC: 8.5 MG/DL (ref 8.3–10.6)
CHLORIDE BLD-SCNC: 104 MMOL/L (ref 99–110)
CLARITY: CLEAR
CO2: 24 MMOL/L (ref 21–32)
COLOR: YELLOW
CREAT SERPL-MCNC: 0.7 MG/DL (ref 0.9–1.3)
EOSINOPHILS ABSOLUTE: 0.3 K/UL (ref 0–0.6)
EOSINOPHILS RELATIVE PERCENT: 3.6 %
GFR AFRICAN AMERICAN: >60
GFR NON-AFRICAN AMERICAN: >60
GLUCOSE BLD-MCNC: 121 MG/DL (ref 70–99)
GLUCOSE URINE: NEGATIVE MG/DL
HCT VFR BLD CALC: 37.7 % (ref 40.5–52.5)
HEMOGLOBIN: 12.9 G/DL (ref 13.5–17.5)
KETONES, URINE: NEGATIVE MG/DL
LEUKOCYTE ESTERASE, URINE: NEGATIVE
LYMPHOCYTES ABSOLUTE: 2.3 K/UL (ref 1–5.1)
LYMPHOCYTES RELATIVE PERCENT: 24.3 %
MAGNESIUM: 2 MG/DL (ref 1.8–2.4)
MCH RBC QN AUTO: 30.7 PG (ref 26–34)
MCHC RBC AUTO-ENTMCNC: 34.2 G/DL (ref 31–36)
MCV RBC AUTO: 89.7 FL (ref 80–100)
MICROSCOPIC EXAMINATION: NORMAL
MONOCYTES ABSOLUTE: 0.4 K/UL (ref 0–1.3)
MONOCYTES RELATIVE PERCENT: 4.3 %
NEUTROPHILS ABSOLUTE: 6.5 K/UL (ref 1.7–7.7)
NEUTROPHILS RELATIVE PERCENT: 67.6 %
NITRITE, URINE: NEGATIVE
PDW BLD-RTO: 13.6 % (ref 12.4–15.4)
PH UA: 7 (ref 5–8)
PLATELET # BLD: 290 K/UL (ref 135–450)
PMV BLD AUTO: 7.7 FL (ref 5–10.5)
POTASSIUM REFLEX MAGNESIUM: 3.2 MMOL/L (ref 3.5–5.1)
PRO-BNP: 32 PG/ML (ref 0–124)
PROTEIN UA: NEGATIVE MG/DL
RBC # BLD: 4.2 M/UL (ref 4.2–5.9)
SODIUM BLD-SCNC: 138 MMOL/L (ref 136–145)
SPECIFIC GRAVITY UA: 1.01 (ref 1–1.03)
TROPONIN: <0.01 NG/ML
URINE TYPE: NORMAL
UROBILINOGEN, URINE: 0.2 E.U./DL
WBC # BLD: 9.6 K/UL (ref 4–11)

## 2019-05-13 PROCEDURE — 2580000003 HC RX 258: Performed by: NURSE PRACTITIONER

## 2019-05-13 PROCEDURE — 71046 X-RAY EXAM CHEST 2 VIEWS: CPT

## 2019-05-13 PROCEDURE — 85025 COMPLETE CBC W/AUTO DIFF WBC: CPT

## 2019-05-13 PROCEDURE — 1200000000 HC SEMI PRIVATE

## 2019-05-13 PROCEDURE — 80048 BASIC METABOLIC PNL TOTAL CA: CPT

## 2019-05-13 PROCEDURE — 2580000003 HC RX 258: Performed by: EMERGENCY MEDICINE

## 2019-05-13 PROCEDURE — 6370000000 HC RX 637 (ALT 250 FOR IP): Performed by: NURSE PRACTITIONER

## 2019-05-13 PROCEDURE — 93005 ELECTROCARDIOGRAM TRACING: CPT | Performed by: EMERGENCY MEDICINE

## 2019-05-13 PROCEDURE — 96376 TX/PRO/DX INJ SAME DRUG ADON: CPT

## 2019-05-13 PROCEDURE — 6360000002 HC RX W HCPCS: Performed by: EMERGENCY MEDICINE

## 2019-05-13 PROCEDURE — 36592 COLLECT BLOOD FROM PICC: CPT

## 2019-05-13 PROCEDURE — 87040 BLOOD CULTURE FOR BACTERIA: CPT

## 2019-05-13 PROCEDURE — 6360000002 HC RX W HCPCS: Performed by: INTERNAL MEDICINE

## 2019-05-13 PROCEDURE — 6360000002 HC RX W HCPCS: Performed by: NURSE PRACTITIONER

## 2019-05-13 PROCEDURE — 6370000000 HC RX 637 (ALT 250 FOR IP): Performed by: INTERNAL MEDICINE

## 2019-05-13 PROCEDURE — 96365 THER/PROPH/DIAG IV INF INIT: CPT

## 2019-05-13 PROCEDURE — 81003 URINALYSIS AUTO W/O SCOPE: CPT

## 2019-05-13 PROCEDURE — 99253 IP/OBS CNSLTJ NEW/EST LOW 45: CPT | Performed by: SURGERY

## 2019-05-13 PROCEDURE — 94150 VITAL CAPACITY TEST: CPT

## 2019-05-13 PROCEDURE — 83735 ASSAY OF MAGNESIUM: CPT

## 2019-05-13 RX ORDER — HYDROMORPHONE HCL 110MG/55ML
0.5 PATIENT CONTROLLED ANALGESIA SYRINGE INTRAVENOUS
Status: DISCONTINUED | OUTPATIENT
Start: 2019-05-13 | End: 2019-05-13

## 2019-05-13 RX ORDER — POTASSIUM CHLORIDE 20 MEQ/1
40 TABLET, EXTENDED RELEASE ORAL 3 TIMES DAILY
Status: DISCONTINUED | OUTPATIENT
Start: 2019-05-13 | End: 2019-05-13 | Stop reason: HOSPADM

## 2019-05-13 RX ORDER — ALBUTEROL SULFATE 2.5 MG/3ML
2.5 SOLUTION RESPIRATORY (INHALATION)
Status: DISCONTINUED | OUTPATIENT
Start: 2019-05-13 | End: 2019-05-13

## 2019-05-13 RX ORDER — SODIUM CHLORIDE 0.9 % (FLUSH) 0.9 %
10 SYRINGE (ML) INJECTION PRN
Status: DISCONTINUED | OUTPATIENT
Start: 2019-05-13 | End: 2019-05-13 | Stop reason: HOSPADM

## 2019-05-13 RX ORDER — ACETAMINOPHEN 325 MG/1
650 TABLET ORAL EVERY 4 HOURS PRN
Status: DISCONTINUED | OUTPATIENT
Start: 2019-05-13 | End: 2019-05-13 | Stop reason: HOSPADM

## 2019-05-13 RX ORDER — OXYCODONE HYDROCHLORIDE 5 MG/1
10 TABLET ORAL EVERY 4 HOURS PRN
Status: DISCONTINUED | OUTPATIENT
Start: 2019-05-13 | End: 2019-05-13 | Stop reason: HOSPADM

## 2019-05-13 RX ORDER — SODIUM CHLORIDE 0.9 % (FLUSH) 0.9 %
10 SYRINGE (ML) INJECTION EVERY 12 HOURS SCHEDULED
Status: DISCONTINUED | OUTPATIENT
Start: 2019-05-13 | End: 2019-05-13 | Stop reason: HOSPADM

## 2019-05-13 RX ORDER — HYDROMORPHONE HCL 110MG/55ML
2 PATIENT CONTROLLED ANALGESIA SYRINGE INTRAVENOUS EVERY 4 HOURS PRN
Status: DISCONTINUED | OUTPATIENT
Start: 2019-05-13 | End: 2019-05-13 | Stop reason: HOSPADM

## 2019-05-13 RX ORDER — HYDROMORPHONE HCL 110MG/55ML
1 PATIENT CONTROLLED ANALGESIA SYRINGE INTRAVENOUS
Status: DISCONTINUED | OUTPATIENT
Start: 2019-05-13 | End: 2019-05-13

## 2019-05-13 RX ORDER — ONDANSETRON 2 MG/ML
4 INJECTION INTRAMUSCULAR; INTRAVENOUS EVERY 6 HOURS PRN
Status: DISCONTINUED | OUTPATIENT
Start: 2019-05-13 | End: 2019-05-13 | Stop reason: HOSPADM

## 2019-05-13 RX ORDER — ALBUTEROL SULFATE 2.5 MG/3ML
2.5 SOLUTION RESPIRATORY (INHALATION) EVERY 4 HOURS PRN
Status: DISCONTINUED | OUTPATIENT
Start: 2019-05-13 | End: 2019-05-13 | Stop reason: HOSPADM

## 2019-05-13 RX ORDER — 0.9 % SODIUM CHLORIDE 0.9 %
1000 INTRAVENOUS SOLUTION INTRAVENOUS ONCE
Status: DISCONTINUED | OUTPATIENT
Start: 2019-05-13 | End: 2019-05-13 | Stop reason: HOSPADM

## 2019-05-13 RX ORDER — SODIUM CHLORIDE 9 MG/ML
INJECTION, SOLUTION INTRAVENOUS CONTINUOUS
Status: DISCONTINUED | OUTPATIENT
Start: 2019-05-13 | End: 2019-05-13 | Stop reason: HOSPADM

## 2019-05-13 RX ADMIN — POTASSIUM CHLORIDE 40 MEQ: 20 TABLET, EXTENDED RELEASE ORAL at 14:40

## 2019-05-13 RX ADMIN — PIPERACILLIN SODIUM,TAZOBACTAM SODIUM 3.38 G: 3; .375 INJECTION, POWDER, FOR SOLUTION INTRAVENOUS at 12:52

## 2019-05-13 RX ADMIN — HYDROMORPHONE HYDROCHLORIDE 0.5 MG: 1 INJECTION, SOLUTION INTRAMUSCULAR; INTRAVENOUS; SUBCUTANEOUS at 01:19

## 2019-05-13 RX ADMIN — HYDROMORPHONE HYDROCHLORIDE 0.5 MG: 2 INJECTION, SOLUTION INTRAMUSCULAR; INTRAVENOUS; SUBCUTANEOUS at 03:46

## 2019-05-13 RX ADMIN — ENOXAPARIN SODIUM 40 MG: 40 INJECTION SUBCUTANEOUS at 10:09

## 2019-05-13 RX ADMIN — OXYCODONE HYDROCHLORIDE 10 MG: 5 TABLET ORAL at 05:02

## 2019-05-13 RX ADMIN — VANCOMYCIN HYDROCHLORIDE 1250 MG: 1 INJECTION, POWDER, LYOPHILIZED, FOR SOLUTION INTRAVENOUS at 10:10

## 2019-05-13 RX ADMIN — SODIUM CHLORIDE, PRESERVATIVE FREE 10 ML: 5 INJECTION INTRAVENOUS at 10:03

## 2019-05-13 RX ADMIN — HYDROMORPHONE HYDROCHLORIDE 0.5 MG: 2 INJECTION, SOLUTION INTRAMUSCULAR; INTRAVENOUS; SUBCUTANEOUS at 06:47

## 2019-05-13 RX ADMIN — PIPERACILLIN SODIUM,TAZOBACTAM SODIUM 4.5 G: 4; .5 INJECTION, POWDER, FOR SOLUTION INTRAVENOUS at 04:14

## 2019-05-13 RX ADMIN — HYDROMORPHONE HYDROCHLORIDE 1 MG: 2 INJECTION, SOLUTION INTRAMUSCULAR; INTRAVENOUS; SUBCUTANEOUS at 13:45

## 2019-05-13 RX ADMIN — VANCOMYCIN HYDROCHLORIDE 1000 MG: 1 INJECTION, POWDER, LYOPHILIZED, FOR SOLUTION INTRAVENOUS at 01:23

## 2019-05-13 RX ADMIN — HYDROMORPHONE HYDROCHLORIDE 0.5 MG: 2 INJECTION, SOLUTION INTRAMUSCULAR; INTRAVENOUS; SUBCUTANEOUS at 10:01

## 2019-05-13 RX ADMIN — SODIUM CHLORIDE: 9 INJECTION, SOLUTION INTRAVENOUS at 03:43

## 2019-05-13 ASSESSMENT — PAIN DESCRIPTION - LOCATION
LOCATION: ABDOMEN
LOCATION: ABDOMEN

## 2019-05-13 ASSESSMENT — PAIN DESCRIPTION - FREQUENCY: FREQUENCY: CONTINUOUS

## 2019-05-13 ASSESSMENT — PAIN SCALES - GENERAL
PAINLEVEL_OUTOF10: 7
PAINLEVEL_OUTOF10: 9
PAINLEVEL_OUTOF10: 7
PAINLEVEL_OUTOF10: 9
PAINLEVEL_OUTOF10: 9

## 2019-05-13 ASSESSMENT — PAIN DESCRIPTION - DESCRIPTORS: DESCRIPTORS: CONSTANT

## 2019-05-13 ASSESSMENT — PAIN DESCRIPTION - PAIN TYPE
TYPE: ACUTE PAIN
TYPE: ACUTE PAIN

## 2019-05-13 ASSESSMENT — PAIN DESCRIPTION - PROGRESSION: CLINICAL_PROGRESSION: GRADUALLY IMPROVING

## 2019-05-13 ASSESSMENT — PAIN DESCRIPTION - ORIENTATION: ORIENTATION: MID;LOWER;LEFT;RIGHT

## 2019-05-13 ASSESSMENT — PAIN DESCRIPTION - ONSET: ONSET: ON-GOING

## 2019-05-13 NOTE — ED PROVIDER NOTES
[facility] Emergency Department    CHIEF COMPLAINT  Abdominal Pain (colon cancer. was just admit for pain )      HISTORY OF PRESENT ILLNESS  Lea Leonardo is a 37 y.o. male  presenting in the ER with abdominal pain and vomiting for the past week or so. Patient was recently admitted to the hospital and according to hospital records and the patient surgery was recommended by general surgery. Patient says that he had personal matters to attend to first and returns with worsening abdominal pain and vomiting. Nothing seems to make the pain better or worse. The pain is mostly in the epigastric and mid abdominal area. Patient does have a history of colon cancer. Patient does admit to being short of breath for the past day or so as well. No other complaints, modifying factors or associated symptoms. I have reviewed the following from the nursing documentation. 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 No pertinent family history.   Social History     Socioeconomic History    Marital status:      Spouse name: Not on file    Number of children: Not on file    Years of education: Not on file    Highest education level: Not on file   Occupational History    Not on file   Social Needs    Financial resource strain: Not on file    Food insecurity: Worry: Not on file     Inability: Not on file    Transportation needs:     Medical: Not on file     Non-medical: Not on file   Tobacco Use    Smoking status: Current Every Day Smoker     Packs/day: 0.50     Years: 10.00     Pack years: 5.00     Types: Cigarettes    Smokeless tobacco: Never Used    Tobacco comment: currently smoking 1/2 ppd   Substance and Sexual Activity    Alcohol use: Yes     Comment: RARELY, was a heavy drinker    Drug use: Yes     Types: Marijuana     Comment: STOPPED 1/2018    Sexual activity: Not on file   Lifestyle    Physical activity:     Days per week: Not on file     Minutes per session: Not on file    Stress: Not on file   Relationships    Social connections:     Talks on phone: Not on file     Gets together: Not on file     Attends Shinto service: Not on file     Active member of club or organization: Not on file     Attends meetings of clubs or organizations: Not on file     Relationship status: Not on file    Intimate partner violence:     Fear of current or ex partner: Not on file     Emotionally abused: Not on file     Physically abused: Not on file     Forced sexual activity: Not on file   Other Topics Concern    Not on file   Social History Narrative    Not on file     Current Facility-Administered Medications   Medication Dose Route Frequency Provider Last Rate Last Dose    0.9 % sodium chloride bolus  1,000 mL Intravenous Once Ean Proper, DO        oxyCODONE (ROXICODONE) immediate release tablet 10 mg  10 mg Oral Q4H PRN SURYA Anthony CNP   10 mg at 05/13/19 0502    sodium chloride flush 0.9 % injection 10 mL  10 mL Intravenous 2 times per day SURYA Anthony CNP        sodium chloride flush 0.9 % injection 10 mL  10 mL Intravenous PRN SURYA Anthony CNP        magnesium hydroxide (MILK OF MAGNESIA) 400 MG/5ML suspension 30 mL  30 mL Oral Daily PRN SURYA Anthony CNP        ondansetron (ZOFRAN) injection 4 mg  4 mg Intravenous Q6H PRN Mikey Torresuch, APRN - CNP        enoxaparin (LOVENOX) injection 40 mg  40 mg Subcutaneous Daily Mikey Elizabeth, APRN - CNP        piperacillin-tazobactam (ZOSYN) 3.375 g in dextrose 5 % 50 mL IVPB extended infusion (mini-bag)  3.375 g Intravenous Q8H Mikey Torresuch, APRN - CNP        0.9 % sodium chloride infusion   Intravenous Continuous Mikey Torresuch, APRN - CNP 75 mL/hr at 05/13/19 0343      acetaminophen (TYLENOL) tablet 650 mg  650 mg Oral Q4H PRN Mikey Torresuch, APRN - CNP        vancomycin (VANCOCIN) 1,250 mg in dextrose 5 % 250 mL IVPB  1,250 mg Intravenous Q8H Mikey Torresuch, APRN - CNP        HYDROmorphone (DILAUDID) injection 0.5 mg  0.5 mg Intravenous Q3H PRN Mikey Elizabeth, APRN - CNP   0.5 mg at 05/13/19 0346    albuterol (PROVENTIL) nebulizer solution 2.5 mg  2.5 mg Nebulization Q4H PRN Ryanne Correia MD         No Known Allergies    REVIEW OF SYSTEMS  10 systems reviewed, pertinent positives per HPI otherwise noted to be negative. PHYSICAL EXAM  /81   Pulse 97   Temp 98 °F (36.7 °C) (Oral)   Resp 18   Ht 5' 10\" (1.778 m)   Wt 221 lb 5.5 oz (100.4 kg)   SpO2 95%   BMI 31.76 kg/m²   GENERAL APPEARANCE: Awake and alert. Cooperative. Mild distress secondary to pain  HEAD: Normocephalic. Atraumatic. EYES: PERRL. EOM's grossly intact. ENT: Mucous membranes are moist.   NECK: Supple. Non-tender  HEART: RRR. LUNGS: Respirations unlabored. Bibasilar crackles  ABDOMEN: Soft. Non-distended. Epigastric and periumbilical area tenderness to palpation No masses. No organomegaly. No guarding or rebound. BACK:  No midline Tenderness. EXTREMITIES: No peripheral edema. Moves all extremities equally. All extremities neurovascularly intact. SKIN: Warm and dry. No acute rashes. NEUROLOGICAL: Alert and oriented. CN's 2-12 intact. No gross facial drooping. Strength 5/5, sensation intact. 2 plus DTR's in lower extremity bilaterally. PSYCHIATRIC: Normal mood and affect.     LABS  I have reviewed all labs for this visit.    Results for orders placed or performed during the hospital encounter of 05/12/19   CBC Auto Differential   Result Value Ref Range    WBC 10.8 4.0 - 11.0 K/uL    RBC 4.91 4.20 - 5.90 M/uL    Hemoglobin 15.0 13.5 - 17.5 g/dL    Hematocrit 44.0 40.5 - 52.5 %    MCV 89.6 80.0 - 100.0 fL    MCH 30.6 26.0 - 34.0 pg    MCHC 34.1 31.0 - 36.0 g/dL    RDW 13.6 12.4 - 15.4 %    Platelets 286 769 - 238 K/uL    MPV 7.5 5.0 - 10.5 fL    Neutrophils % 71.4 %    Lymphocytes % 22.0 %    Monocytes % 3.8 %    Eosinophils % 2.3 %    Basophils % 0.5 %    Neutrophils # 7.7 1.7 - 7.7 K/uL    Lymphocytes # 2.4 1.0 - 5.1 K/uL    Monocytes # 0.4 0.0 - 1.3 K/uL    Eosinophils # 0.3 0.0 - 0.6 K/uL    Basophils # 0.1 0.0 - 0.2 K/uL   Comprehensive Metabolic Panel   Result Value Ref Range    Sodium 137 136 - 145 mmol/L    Potassium 3.8 3.5 - 5.1 mmol/L    Chloride 98 (L) 99 - 110 mmol/L    CO2 26 21 - 32 mmol/L    Anion Gap 13 3 - 16    Glucose 146 (H) 70 - 99 mg/dL    BUN 9 7 - 20 mg/dL    CREATININE 0.8 (L) 0.9 - 1.3 mg/dL    GFR Non-African American >60 >60    GFR African American >60 >60    Calcium 9.7 8.3 - 10.6 mg/dL    Total Protein 8.5 (H) 6.4 - 8.2 g/dL    Alb 4.4 3.4 - 5.0 g/dL    Albumin/Globulin Ratio 1.1 1.1 - 2.2    Total Bilirubin 0.9 0.0 - 1.0 mg/dL    Alkaline Phosphatase 133 (H) 40 - 129 U/L    ALT 34 10 - 40 U/L    AST 32 15 - 37 U/L    Globulin 4.1 g/dL   Lipase   Result Value Ref Range    Lipase 66.0 (H) 13.0 - 60.0 U/L   Urinalysis   Result Value Ref Range    Color, UA Yellow Straw/Yellow    Clarity, UA Clear Clear    Glucose, Ur Negative Negative mg/dL    Bilirubin Urine Negative Negative    Ketones, Urine Negative Negative mg/dL    Specific Gravity, UA 1.010 1.005 - 1.030    Blood, Urine Negative Negative    pH, UA 7.0 5.0 - 8.0    Protein, UA Negative Negative mg/dL    Urobilinogen, Urine 0.2 <2.0 E.U./dL    Nitrite, Urine Negative Negative    Leukocyte Esterase, Urine Negative Negative    Microscopic Examination Not Indicated     Urine Type Not Specified    Lactic Acid, Plasma   Result Value Ref Range    Lactic Acid 1.7 0.4 - 2.0 mmol/L   Protime-INR   Result Value Ref Range    Protime 13.3 (H) 9.8 - 13.0 sec    INR 1.17 (H) 0.86 - 1.14   Brain Natriuretic Peptide   Result Value Ref Range    Pro-BNP 32 0 - 124 pg/mL   Troponin   Result Value Ref Range    Troponin <0.01 <0.01 ng/mL   Basic Metabolic Panel w/ Reflex to MG   Result Value Ref Range    Sodium 138 136 - 145 mmol/L    Potassium reflex Magnesium 3.2 (L) 3.5 - 5.1 mmol/L    Chloride 104 99 - 110 mmol/L    CO2 24 21 - 32 mmol/L    Anion Gap 10 3 - 16    Glucose 121 (H) 70 - 99 mg/dL    BUN 8 7 - 20 mg/dL    CREATININE 0.7 (L) 0.9 - 1.3 mg/dL    GFR Non-African American >60 >60    GFR African American >60 >60    Calcium 8.5 8.3 - 10.6 mg/dL   CBC auto differential   Result Value Ref Range    WBC 9.6 4.0 - 11.0 K/uL    RBC 4.20 4. 20 - 5.90 M/uL    Hemoglobin 12.9 (L) 13.5 - 17.5 g/dL    Hematocrit 37.7 (L) 40.5 - 52.5 %    MCV 89.7 80.0 - 100.0 fL    MCH 30.7 26.0 - 34.0 pg    MCHC 34.2 31.0 - 36.0 g/dL    RDW 13.6 12.4 - 15.4 %    Platelets 833 566 - 477 K/uL    MPV 7.7 5.0 - 10.5 fL    Neutrophils % 67.6 %    Lymphocytes % 24.3 %    Monocytes % 4.3 %    Eosinophils % 3.6 %    Basophils % 0.2 %    Neutrophils # 6.5 1.7 - 7.7 K/uL    Lymphocytes # 2.3 1.0 - 5.1 K/uL    Monocytes # 0.4 0.0 - 1.3 K/uL    Eosinophils # 0.3 0.0 - 0.6 K/uL    Basophils # 0.0 0.0 - 0.2 K/uL   Magnesium   Result Value Ref Range    Magnesium 2.00 1.80 - 2.40 mg/dL   EKG 12 Lead   Result Value Ref Range    Ventricular Rate 91 BPM    Atrial Rate 91 BPM    P-R Interval 148 ms    QRS Duration 92 ms    Q-T Interval 358 ms    QTc Calculation (Bazett) 440 ms    P Axis 53 degrees    R Axis 50 degrees    T Axis 34 degrees    Diagnosis       Normal sinus rhythm with sinus arrhythmiaIncomplete right bundle branch blockBorderline ECGWhen compared with ECG of 10-KEYSHAWN-2018 Additional Contrast? None    Result Date: 5/8/2019  EXAMINATION: CT OF THE ABDOMEN AND PELVIS WITH CONTRAST 5/8/2019 12:05 am TECHNIQUE: CT of the abdomen and pelvis was performed with the administration of intravenous contrast. Multiplanar reformatted images are provided for review. Dose modulation, iterative reconstruction, and/or weight based adjustment of the mA/kV was utilized to reduce the radiation dose to as low as reasonably achievable. COMPARISON: February 4, 2019. HISTORY: ORDERING SYSTEM PROVIDED HISTORY: abdominal pain, colon cancer, new \"lump\" on left side, known hernia right TECHNOLOGIST PROVIDED HISTORY: If patient is on cardiac monitor and/or pulse ox, they may be taken off cardiac monitor and pulse ox, left on O2 if currently on. All monitors reattached when patient returns to room. Additional Contrast?->None Ordering Physician Provided Reason for Exam: Abdominal Pain (is being treated for colon CA. 36hrs ago large solid lump appeared on left side of abd as well as severe abd pain. Tender if lump is touched. New onset N/V. feels constipated ) FINDINGS: Lower Chest: Visualized lung bases demonstrate subsegmental atelectasis/scarring. No cardiomegaly, pericardial or pleural effusion. Multiple similar appearing prominent retrocrural and periesophageal lymph nodes which demonstrate heterogeneous enhancement and calcification. Liver: Interval mild increase in size of a focal area of hypoattenuation in the subcapsular anterior and medial left hepatic lobe which measures up to 2.5 x 1.5 cm. Mildly increased focal area of hypoattenuation in the subcapsular posterior Paddock dome which measures up to 6.0 x 2.9 cm. Stable subcentimeter subcapsular hypodensity in the posterior inferior right hepatic lobe. Gallbladder and Bile Ducts: Normal. Spleen: Normal. Adrenal Glands: Normal. Pancreas: Normal. Genitourinary: Normal. Bowel: Few prominent loops of small bowel with wall thickening in the anterior abdomen. Postsurgical changes of the bowel. . Vasculature: Atherosclerosis. No abdominal aortic aneurysm. Bones and Soft Tissues: Large ventral hernia containing hazy mesenteric fat, multiple loops of small bowel and colon and loculated fluid. Some of the loops of small bowel demonstrate wall thickening with air-fluid levels and fecalization of contents. Retroperitoneum/Mesentery: Stable upper abdominal and retroperitoneal lymphadenopathy. Multiple small calcified retroperitoneal lymph nodes are similar to the prior study. Small amount of free fluid in the abdomen and pelvis is increased from the prior study. Free fluid in the posterior pelvis demonstrates fluid hematocrit level. Abnormal haziness and swirling in the anterior and central mesentery. Multiple prominent mesenteric lymph nodes are similar to the prior study. Interval increase in size of a peripherally enhancing fluid collection in the right pericolic gutter just inferior to the right hepatic lobe which measures 4.1 x 3.3 by 3.7 cm. This previously measured 3.0 x 3.0 x 2.5 cm. Stable focal area of right rectus abdominal musculature nodular thickening, adjacent to the ventral hernia. Large ventral hernia containing hazy mesenteric fat, multiple loops of small bowel and colon and loculated fluid. Some of the loops of small bowel demonstrate wall thickening with air-fluid levels and fecalization of contents. Multiple loops of prominent small bowel in the anterior abdomen with associated mesenteric swirling. Overall, findings suggest small bowel obstruction. The new lump on the left side may relate to new loculated fluid in the patient's ventral hernia which was not seen on the prior study and is located along the left lateral aspect of the umbilicus. Increased small amount of free fluid in the abdomen and pelvis. Fluid in the posterior pelvis demonstrates a fluid hematocrit level suggestive of a small amount of blood products.  Increase in size of a peripherally enhancing fluid collection in the right pericolic gutter just inferior to the liver which measures 4.1 x 3.3 cm, previously 3.0 x 3.0 cm. This could represent a small abscess. Focal areas of hypoattenuation in the subcapsular liver are mildly increased from the prior study. Underlying neoplastic process is not excluded. Similar-appearing upper abdominal and retroperitoneal lymphadenopathy. ED COURSE/MDM  Patient seen and evaluated. Old records reviewed. Labs and imaging reviewed and results discussed with patient. This is a 68-year-old male with a known history of colon cancer presenting with abdominal pain and shortness of breath. Patient was found to have pneumonia. CT findings were discussed with the surgeon on call and patient was given antibiotics and admitted for further management. CLINICAL IMPRESSION  1. Pneumonia due to organism    2. Malignant neoplasm of colon, unspecified part of colon ([address]    3. Abdominal pain, unspecified abdominal location        Blood pressure 134/81, pulse 97, temperature 98 °F (36.7 °C), temperature source Oral, resp. rate 18, height 5' 10\" (1.778 m), weight 221 lb 5.5 oz (100.4 kg), SpO2 95 %. DISPOSITION  Latrelle Merlin was admitted in fair condition.          Yoandy Gonsales DO  05/13/19 [id number]

## 2019-05-13 NOTE — ED NOTES
Bedside report to [facility] at this time. Patient is alert and oriented with wife at bedside. Personal belongings transported with patient in lap.  Patient on tele monitoring      Loman Milligan, [place]  05/13/19 0104

## 2019-05-13 NOTE — PROGRESS NOTES
Pt just left AMA. Pt refused to sign the AMA paper. He said that we were \"not controlling his pain\" and that he can \"do better at home\". Pts port was deacessed and tele monitor was taken off. Asked pt how the 1 mg of Dilaudid worked and he said that he never got it. 1 mg of Diluaddud was given at 13:45. Pt would not wait for us to explain that his dose had been increased.

## 2019-05-13 NOTE — H&P
Hospital Medicine History & Physical      PCP: No primary care provider on file. Date of Admission: 5/12/2019    Date of Service: Pt seen/examined on 13 May and Admitted to Inpatient with expected LOS greater than two midnights due to medical therapy. Chief Complaint:  Abdominal pain       History Of Present Illness:    37 y.o. male w/ known abdominal hernia who presented to [facility] with a several day hx of increasing abdominal pain, now severe and unremitting w/out associated N/V. The patient denies any fever/chills or other signs/sxs of systemic illness or identifiable aggravating/alleviating factors. 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 Authorizing Provider   oxyCODONE HCl (OXY-IR) 10 MG immediate release tablet 1 po q 4 h prn pain. 8/7/17   SURYA Fabian CNP   morphine (MS CONTIN) 30 MG extended release tablet Take 1 tablet by mouth 2 times daily . Patient taking differently: Take 30 mg by mouth every 12 hours as needed. . 8/7/17   SURYA Fabian CNP       Allergies:  Patient has no known allergies. Social History:      The patient currently lives independently    TOBACCO:   reports that he has been smoking cigarettes. Labs     05/12/19 2200 05/13/19  0430    138   K 3.8 3.2*   CL 98* 104   CO2 26 24   BUN 9 8   CREATININE 0.8* 0.7*   CALCIUM 9.7 8.5     Recent Labs     05/12/19 2200   AST 32   ALT 34   BILITOT 0.9   ALKPHOS 133*     Recent Labs     05/12/19 2200   INR 1.17*     Recent Labs     05/12/19 2200   TROPONINI <0.01       Urinalysis:      Lab Results   Component Value Date    NITRU Negative 05/13/2019    WBCUA 0-2 09/08/2016    BACTERIA 1+ 08/18/2016    RBCUA 3-5 09/08/2016    BLOODU Negative 05/13/2019    SPECGRAV 1.010 05/13/2019    GLUCOSEU Negative 05/13/2019         ASSESSMENT:    Active Hospital Problems    Diagnosis Date Noted    Cancer of right colon ([facility] [address] [C18.2] 09/28/2015     Priority: High    Obesity [E66.9] 05/13/2019    Alcohol dependence ([facility] [address] [F10.20] 05/29/2015    Pneumonia [J18.9]        PLAN:      Abdominal Pain - 2nd to incisional hernia w/out evidence of obstruction. General surgery consulted and appreciated. Continue General diet for now. PNA - multifocal ASD w/ bilateral mid/lower ASD. Pulmonary edema possible w/out evidence of CHF. Started on Vanco/Zosyn in ED 12 May - Zosyn continued. Colon Cancer - remote in 2015 s/p Colectomy w/ Chemo. Continue outpt f/u as previously arranged. Patient w/ incisional hernia     Obesity -  With Body mass index is 31.76 kg/m². Complicating assessment and treatment. Placing patient at risk for multiple co-morbidities as well as early death and contributing to the patient's presentation. Counseled on weight loss. Alcohol Dependence - w/out signs/sxs of w/drawal.  Will continue to follow clinically    HypoKalemia - of unclear etiology. Will continue to follow serial labs and replace PRN - ordered PO 13 May. Reviewed and documented as above. DVT Prophylaxis: LMWH  Diet: DIET GENERAL;  Code Status: Full Code      PT/OT Eval Status: not currently ordered. Dispo - likely to home 2-3 days unless surgical course dictates otherwise.

## 2019-05-13 NOTE — PROGRESS NOTES
Called to pt's room by another nurse. Pt visibly upset and verbally aggressive stating \"the only reason I'm here is for pain mangement and you guys aren't doing shit for my pain. It's been five hours since I've received pain medication, I've been sitting here for five hours in pain. I can do better managing my pain at home. \"  Pt had dressing off port. Offered pt pain medication, patient adamantly refused stating he just wanted RN to \"get this needle out and let me leave. \" Informed patient he would need to sign out AMA if he wanted to leave. Pt verbalized understanding. Upon re-entering pt's room with [facility] paperwork, pt refused to sign paperwork stating \"I'm not leaving against medical advice because you guys haven't given me any advice\" and walked out of room.   Hospitalist notified by primary RN

## 2019-05-13 NOTE — PROGRESS NOTES
Patient from ED, report from [facility]. Four eye assessment with Kenisha Ackerman, charted. No skin issues noted. Assessment completed and documented. VSS. A/ox4. C/o of pain 7/10. Patient just here 2 days ago, asked NP for dilaudid. NP ordered 0.5 mg q3, administered. Port accessed. IV fluids running. Patient ambulates independently without assistive devices. Family at bedside. Denies further needs at this time. Bed locked and in lowest position. Bedside table and call light within reach. Will continue to monitor.

## 2019-05-13 NOTE — PROGRESS NOTES
4 Eyes Skin Assessment     The patient is being assess for  Admission    I agree that 2 RN's have performed a thorough Head to Toe Skin Assessment on the patient. ALL assessment sites listed below have been assessed. Areas assessed by both nurses: Rosanna Nam RN  [x]   Head, Face, and Ears   [x]   Shoulders, Back, and Chest  [x]   Arms, Elbows, and Hands   [x]   Coccyx, Sacrum, and Ischum  [x]   Legs, Feet, and Heels        Does the Patient have Skin Breakdown?   No         Kevin Prevention initiated:  No   Wound Care Orders initiated:  No      [facility] nurse consulted for Pressure Injury (Stage 3,4, Unstageable, DTI, NWPT, and Complex wounds):  No      Nurse 1 eSignature: Electronically signed by Carlitos Sharp RN on 5/13/19 at 2:44 AM    **SHARE this note so that the co-signing nurse is able to place an eSignature**    Nurse 2 eSignature: Electronically signed by Lachelle Nguyen RN on 5/13/19 at 2:54 AM

## 2019-05-13 NOTE — PROGRESS NOTES
RESPIRATORY THERAPY ASSESSMENT    Name:  Daniela Mercedes  Medical Record Number:  [id number]  Age: 37 y.o. Gender: male  : 1980  Today's Date:  2019  Room:  0334/0334-01    Assessment     Is the patient being admitted for a COPD or Asthma exacerbation? No   (If yes the patient will be seen every 4 hours for the first 24 hours and then reassessed)    Patient Admission Diagnosis      Allergies  No Known Allergies    Minimum Predicted Vital Capacity:     1095          Actual Vital Capacity:      4500              Pulmonary History:No history  Home Oxygen Therapy:  room air  Home Respiratory Therapy:None   Current Respiratory Therapy:  Albuterol PRN  Treatment Type: IS       Respiratory Severity Index(RSI)   Patients with orders for inhalation medications, oxygen, or any therapeutic treatment modality will be placed on Respiratory Protocol. They will be assessed with the first treatment and at least every 72 hours thereafter. The following severity scale will be used to determine frequency of treatment intervention.     Smoking History: Smoking History Less than 1ppd or less than 15 pack year = 1    Social History  Social History     Tobacco Use    Smoking status: Current Every Day Smoker     Packs/day: 0.50     Years: 10.00     Pack years: 5.00     Types: Cigarettes    Smokeless tobacco: Never Used    Tobacco comment: currently smoking 1/2 ppd   Substance Use Topics    Alcohol use: Yes     Comment: RARELY, was a heavy drinker    Drug use: Yes     Types: Marijuana     Comment: STOPPED 2018       Recent Surgical History: None = 0  Past Surgical History  Past Surgical History:   Procedure Laterality Date    COLON SURGERY  2015    COLONOSCOPY      bx polyps    HERNIA REPAIR      HYDROCELE EXCISION Right 2018    OTHER SURGICAL HISTORY  2015    lap converted to open right colectomy    [facility]    OTHER SURGICAL HISTORY  2016 hernia repair and bowel resection       Level of Consciousness: Alert, Oriented, and Cooperative = 0    Level of Activity: Walking unassisted = 0    Respiratory Pattern: Regular Pattern; RR 8-20 = 0    Breath Sounds: Clear = 0    Sputum   ,  , Sputum How Obtained: Spontaneous cough  Cough: Strong, spontaneous, non-productive = 0    Vital Signs   /81   Pulse 97   Temp 98 °F (36.7 °C) (Oral)   Resp 18   Ht 5' 10\" (1.778 m)   Wt 200 lb (90.7 kg)   SpO2 91%   BMI 28.70 kg/m²   SPO2 (COPD values may differ): Greater than or equal to 92% on room air = 0    Peak Flow (asthma only): not applicable = 0    RSI: 0-4 = See once and convert to home regimen or discontinue        Plan       Goals: medication delivery, mobilize retained secretions, volume expansion and improve oxygenation    Patient/caregiver was educated on the proper method of use for Respiratory Care Devices:  No:       Level of patient/caregiver understanding able to:   ? Verbalize understanding   ? Demonstrate understanding       ? Teach back        ? Needs reinforcement       ? No available caregiver               ? Other:     Response to education:  Excellent     Is patient being placed on Home Treatment Regimen? No     Does the patient have everything they need prior to discharge? NA     Comments: pt assessed and chart reviewed    Plan of Care: RE- EVAL DUE 5/16 @ 0300    Electronically signed by Nikky Pacheco RCP on 5/13/2019 at 3:21 AM    Respiratory Protocol Guidelines     1. Assessment and treatment by Respiratory Therapy will be initiated for medication and therapeutic interventions upon initiation of aerosolized medication. 2. Physician will be contacted for respiratory rate (RR) greater than 35 breaths per minute. Therapy will be held for heart rate (HR) greater than 140 beats per minute, pending direction from physician.   3. Bronchodilators will be administered via Metered Dose Inhaler (MDI) with spacer when the following criteria are met:  a. Alert and cooperative     b. HR < 140 bpm  c. RR < 30 bpm                d. Can demonstrate a 2-3 second inspiratory hold  4. Bronchodilators will be administered via Hand Held Nebulizer [facility]) to patients when ANY of the following criteria are met  a. Incognizant or uncooperative          b. Patients treated with HHN at Home        c. Unable to demonstrate proper use of MDI with spacer     d. RR > 30 bpm   5. Bronchodilators will be delivered via Metered Dose Inhaler (MDI), HHN, Aerogen to intubated patients on mechanical ventilation. 6. Inhalation medication orders will be delivered and/or substituted as outlined below. Aerosolized Medications Ordering and Administration Guidelines:    1. All Medications will be ordered by a physician, and their frequency and/or modality will be adjusted as defined by the patients Respiratory Severity Index (RSI) score. 2. If the patient does not have documented COPD, consider discontinuing anticholinergics when RSI is less than 9.  3. If the bronchospasm worsens (increased RSI), then the bronchodilator frequency can be increased to a maximum of every 4 hours. If greater than every 4 hours is required, the physician will be contacted. 4. If the bronchospasm improves, the frequency of the bronchodilator can be decreased, based on the patient's RSI, but not less than home treatment regimen frequency. 5. Bronchodilator(s) will be discontinued if patient has a RSI less than 9 and has received no scheduled or as needed treatment for 72  Hrs. Patients Ordered on a Mucolytic Agent:    1. Must always be administered with a bronchodilator. 2. Discontinue if patient experiences worsened bronchospasm, or secretions have lessened to the point that the patient is able to clear them with a cough. Anti-inflammatory and Combination Medications:    1.  If the patient lacks prior history of lung disease, is not using inhaled anti-inflammatory medication at home,

## 2019-05-13 NOTE — CONSULTS
Department of General Surgery Consult    PATIENT NAME: Gladys Meigs OF BIRTH: 1980    ADMISSION DATE: 5/12/2019  9:49 PM      TODAY'S DATE: 5/13/2019    Reason for Consult:  Hernia    Chief Complaint: Abdominal pain    Requesting Physician:  Holger Chance    HISTORY OF PRESENT ILLNESS:              The patient is a 37 y.o. male who presents with abdominal pain with nausea and vomiting as well as shortness of breath. He was just in the hospital last week with similar symptoms. The pain is sharp and stabbing over the hernia. He denies weight loss. No fever or chills.     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    abscess, ABD       Past Surgical History:        Procedure Laterality Date    COLON SURGERY  05/2015    COLONOSCOPY  2015    bx polyps    HERNIA REPAIR      HYDROCELE EXCISION Right 04/25/2018    OTHER SURGICAL HISTORY  05/28/2015    lap converted to open right colectomy    OTHER SURGICAL HISTORY Left 06/2015    mediport    OTHER SURGICAL HISTORY  07/06/2016    hernia repair and bowel resection       Current Medications:   Current Facility-Administered Medications: 0.9 % sodium chloride bolus, 1,000 mL, Intravenous, Once  oxyCODONE (ROXICODONE) immediate release tablet 10 mg, 10 mg, Oral, Q4H PRN  sodium chloride flush 0.9 % injection 10 mL, 10 mL, Intravenous, 2 times per day  sodium chloride flush 0.9 % injection 10 mL, 10 mL, Intravenous, PRN  magnesium hydroxide (MILK OF MAGNESIA) 400 MG/5ML suspension 30 mL, 30 mL, Oral, Daily PRN  ondansetron (ZOFRAN) injection 4 mg, 4 mg, Intravenous, Q6H PRN  enoxaparin (LOVENOX) injection 40 mg, 40 mg, Subcutaneous, Daily  piperacillin-tazobactam (ZOSYN) 3.375 g in dextrose 5 % 50 mL IVPB extended infusion (mini-bag), 3.375 g,

## 2019-05-13 NOTE — CONSULTS
Pharmacy to Dose Vancomycin    Dx: PNA  Goal trough = 15-20 mcg/mL  Pt wt = 90.7 kg  Estimated Creatinine Clearance: 138 mL/min (A) (based on SCr of 0.8 mg/dL (L)). Vancomycin 1000 mg IVPB x 1 in ED at [address] on 5/13. Start Vancomycin 1250 mg IVPB q8h on 5/13  at 0900. Vancomycin trough prior to 4th dose (5/14 at 0000).   Jennifer Giang, PharmD  5/13/2019 2:56 AM

## 2019-05-13 NOTE — ED NOTES
Patient reports pain 10 on 1-10 scale to abdomen.       [facility]  05/12/19 6099

## 2019-05-14 LAB
EKG ATRIAL RATE: 91 BPM
EKG DIAGNOSIS: NORMAL
EKG P AXIS: 53 DEGREES
EKG P-R INTERVAL: 148 MS
EKG Q-T INTERVAL: 358 MS
EKG QRS DURATION: 92 MS
EKG QTC CALCULATION (BAZETT): 440 MS
EKG R AXIS: 50 DEGREES
EKG T AXIS: 34 DEGREES
EKG VENTRICULAR RATE: 91 BPM

## 2019-05-14 PROCEDURE — 93010 ELECTROCARDIOGRAM REPORT: CPT | Performed by: INTERNAL MEDICINE

## 2019-05-18 LAB
BLOOD CULTURE, ROUTINE: NORMAL
CULTURE, BLOOD 2: NORMAL

## 2019-05-21 NOTE — DISCHARGE SUMMARY
Hospital Medicine Discharge Summary    Patient ID: Cande Tijerina      Patient's PCP: No primary care provider on file. Admit Date: 5/12/2019     Discharge Date: 5/13/2019      Admitting Physician: Kamla Matute MD     Discharge Physician: Nagi Hess MD     Discharge Diagnoses: Active Hospital Problems    Diagnosis    Cancer of right colon [facility]) [C18.2]     Priority: High    Obesity [E66.9]    Alcohol dependence ([address] [F10.20]    Pneumonia [J18.9]       The patient was seen and examined on day of discharge and this discharge summary is in conjunction with any daily progress note from day of discharge. Hospital Course: signed out AMA      Abdominal Pain - 2nd to incisional hernia w/out evidence of obstruction. General surgery consulted and appreciated. Continued General      PNA - multifocal ASD w/ bilateral mid/lower ASD. Pulmonary edema possible w/out evidence of CHF. Started on Vanco/Zosyn in ED 12 May - Zosyn continued.      Colon Cancer - remote in 2015 s/p Colectomy w/ Chemo. Continue outpt f/u as previously arranged. Patient w/ incisional hernia      Obesity -  With Body mass index is 31.76 kg/m². Complicating assessment and treatment. Placing patient at risk for multiple co-morbidities as well as early death and contributing to the patient's presentation. Counseled on weight loss.      Alcohol Dependence - w/out signs/sxs of w/drawal.  Followed clinically w/out signs/sxs of w/drawal.     HypoKalemia - of unclear etiology. Followed serial labs and replaced PRN - ordered PO 13 May.          Labs:  For convenience and continuity at follow-up the following most recent labs are provided:      CBC:    Lab Results   Component Value Date    WBC 9.6 05/13/2019    HGB 12.9 05/13/2019    HCT 37.7 05/13/2019     05/13/2019       Renal:    Lab Results   Component Value Date     05/13/2019    K 3.2 05/13/2019     05/13/2019    CO2 24 05/13/2019    BUN 8 05/13/2019    CREATININE 0.7 05/13/2019    CALCIUM 8.5 05/13/2019    PHOS 3.9 05/10/2019         Significant Diagnostic Studies    Radiology:   XR CHEST STANDARD (2 VW)   Final Result   Multifocal airspace disease in the mid to lower lung zones bilaterally. This   is likely pulmonary edema. Pneumonia less likely. CT ABDOMEN PELVIS W IV CONTRAST Additional Contrast? None   Final Result   1. New multifocal pulmonary infiltrates. Findings could be related to a   multifocal pneumonia or edema. 2. Interval resolution of free pelvic fluid noted previously. Persistent   fluid in the ventral abdominal wall hernia sac on the left. 3. Otherwise stable CT of the abdomen and pelvis. Ventral abdominal wall   hernia containing the ileocolic anastomosis and multiple small bowel loops   without obstruction or inflammatory change. 4. Stable focal pleural thickening and nodularity along the margins of the   liver. Partially calcified retrocrural and paraesophageal nodes as well as   retroperitoneal nodes. Given the history of colon cancer, the possibility of   metastatic disease is raised. 5. Stable fluid collection in the right pericolic gutter, possibly an abscess. Consults:     IP CONSULT TO GENERAL SURGERY  IP CONSULT TO HOSPITALIST  IP CONSULT TO PHARMACY    Disposition: home    Condition at Discharge: Stable    Discharge Instructions/Follow-up:  Prn at patient's discretion. Code Status:  Full Code    Activity: activity as tolerated    Diet: regular diet      Discharge Medications:     Discharge Medication List as of 5/13/2019  5:48 PM           Details   oxyCODONE HCl (OXY-IR) 10 MG immediate release tablet 1 po q 4 h prn pain., Disp-120 tablet, R-0Print      morphine (MS CONTIN) 30 MG extended release tablet Take 1 tablet by mouth 2 times daily . , Disp-60 tablet, R-0Print             Time Spent on discharge is more than 30 minutes in the examination, evaluation, counseling and review of medications and discharge plan.       Signed:    Darrius Man MD   5/21/2019

## 2019-05-24 ENCOUNTER — HOSPITAL ENCOUNTER (OUTPATIENT)
Dept: GENERAL RADIOLOGY | Age: 39
Discharge: HOME | End: 2019-05-24
Payer: MEDICARE

## 2019-05-24 ENCOUNTER — HOSPITAL ENCOUNTER (OUTPATIENT)
Age: 39
Discharge: HOME | End: 2019-05-24
Payer: MEDICARE

## 2019-05-24 DIAGNOSIS — C18.2 MALIGNANT TUMOR OF ASCENDING COLON (HCC): ICD-10-CM

## 2019-05-24 PROCEDURE — 71046 X-RAY EXAM CHEST 2 VIEWS: CPT

## 2019-06-11 ENCOUNTER — PREP FOR PROCEDURE (OUTPATIENT)
Dept: SURGERY | Age: 39
End: 2019-06-11

## 2019-06-11 ENCOUNTER — OFFICE VISIT (OUTPATIENT)
Dept: SURGERY | Age: 39
End: 2019-06-11
Payer: MEDICARE

## 2019-06-11 VITALS
HEART RATE: 87 BPM | BODY MASS INDEX: 32.16 KG/M2 | SYSTOLIC BLOOD PRESSURE: 116 MMHG | HEIGHT: 70 IN | DIASTOLIC BLOOD PRESSURE: 78 MMHG | WEIGHT: 224.6 LBS

## 2019-06-11 DIAGNOSIS — K43.0 RECURRENT INCISIONAL HERNIA WITH INCARCERATION: Primary | ICD-10-CM

## 2019-06-11 PROCEDURE — 1111F DSCHRG MED/CURRENT MED MERGE: CPT | Performed by: SURGERY

## 2019-06-11 PROCEDURE — 99213 OFFICE O/P EST LOW 20 MIN: CPT | Performed by: SURGERY

## 2019-06-11 PROCEDURE — G8417 CALC BMI ABV UP PARAM F/U: HCPCS | Performed by: SURGERY

## 2019-06-11 PROCEDURE — 4004F PT TOBACCO SCREEN RCVD TLK: CPT | Performed by: SURGERY

## 2019-06-11 PROCEDURE — G8427 DOCREV CUR MEDS BY ELIG CLIN: HCPCS | Performed by: SURGERY

## 2019-06-11 RX ORDER — SODIUM CHLORIDE 0.9 % (FLUSH) 0.9 %
10 SYRINGE (ML) INJECTION EVERY 12 HOURS SCHEDULED
Status: CANCELLED | OUTPATIENT
Start: 2019-06-11

## 2019-06-11 RX ORDER — SODIUM CHLORIDE 0.9 % (FLUSH) 0.9 %
10 SYRINGE (ML) INJECTION PRN
Status: CANCELLED | OUTPATIENT
Start: 2019-06-11

## 2019-06-11 NOTE — LETTER
Philipp Castañeda are scheduled to have surgery with Dr. Keon Garcia    AT: [facility]    ON: June 19, 2019    ARRIVE AT: 10:45am    YOUR SURGERY WILL BEGIN AT: 12:45pm      You should have nothing by mouth after midnight the evening before your  surgery,  No food or water. Otherwise, Your Surgery Will Be Cancelled. You will need a History and Physical prior to your surgery by your family doctor. The hospital will contact you if any testing is needed. Contact our office if you have a Pacemaker or Defibrillator. If you are allergic to   Latex Gloves,  We need to know prior to surgery. If you have any questions, please feel free to contact [facility] at [phone].

## 2019-06-12 ENCOUNTER — TELEPHONE (OUTPATIENT)
Dept: SURGERY | Age: 39
End: 2019-06-12

## 2019-06-12 NOTE — TELEPHONE ENCOUNTER
Pt was given surgery instructions @ OV w/ Dr Jennifer Briseno on 6/11/19  Davinci Incisional Hernia Repair, Possible Open Procedure, Recurrent Incisional Hernia, JANI scheduled 6/19/19 @ 12:45pm arrival 10:45am [facility] - NPO after midnight - Pt does not have a PCP - Hospitalist to complete H&P, pt given information on where to go for this

## 2019-06-12 NOTE — PATIENT INSTRUCTIONS
[address] [address]  Phone: [phone]  Fax: [phone] will be scheduled for surgery with Dr. Altaf Lopez. · The office will call you with the date and time that surgery is scheduled. · Please take note of these instructions for surgery:  · You should have nothing by mouth after midnight the night before your surgery - this includes no food or water. · Your surgery will be cancelled if you have taken anything by mouth after midnight, NO exceptions. · You will need to have a history and physical prior to your surgery. This will need to be completed up to 30 days before your surgery. This H/P can be completed by your family doctor or the hospital.   · IF you take coumadin (warfarin), please stop taking this medication 5 days prior to your surgery. · IF you take plavix, please stop taking this 7 days prior to your surgery. · Please contact our office if you have a pacemaker or defibrillator. · IF you are allergic to latex, please tell our office prior to your surgery. This is important in know before scheduling your surgery. · IF you are having an out patient surgery, you will need someone available to drive you home after your surgery, and to also stay with you for the rest of the day. · IF you are having a surgery requiring an inpatient stay in the hospital, you will need someone to drive you home upon discharge from the hospital.  · Please contact Dr. Elma Marte assistant Ean Dixon if you have any questions or concerns. · Please call the office with any changes in your symptoms or further questions/concerns.  [phone]

## 2019-06-12 NOTE — PROGRESS NOTES
New Patient [address] Surgery Meena Danita Penaloza, [address] [facility], [address] [facility], [address] [facility]  Phone: [phone]  Fax: [phone]    Wayne Cardona   YOB: 1980    Date of Visit:  6/11/2019    Rasta Carrier  No primary care provider on file. HPI:   Hernia: Patient is 37y.o. year old male seen at request of No primary care provider on file. .  Patient presents for evaluation of  Recurrent right transverse incisional hernia. Was recently admitted with acute pain and SBO related to the hernia. Was planning urgent surgical intervention, but pt refused and left AMA. Has now been home for ~1 month, and his acute sx have resolved. Still w/ pronounced bulge at hernia which he says is tender, but no nausea, vomiting, and reports normal bowel habits. He is reluctant to proceed with surgical repair due to risk of complications. .       No Known Allergies  Outpatient Medications Marked as Taking for the 6/11/19 encounter (Office Visit) with Rajan Bills MD   Medication Sig Dispense Refill    oxyCODONE HCl (OXY-IR) 10 MG immediate release tablet 1 po q 4 h prn pain. 120 tablet 0    morphine (MS CONTIN) 30 MG extended release tablet Take 1 tablet by mouth 2 times daily . (Patient taking differently: Take 30 mg by mouth every 12 hours as needed. Lore Rdz ) 60 tablet 0       Past Medical History:   Diagnosis Date    Acute pancreatitis     [address] [address] 05/2015    colon, COLECTOMY, CHEMO, IMMUNE THERAPY    GERD (gastroesophageal reflux disease)     Pneumonia     VRE infection (vancomycin resistant Enterococcus) 07/14/2016    abscess, ABD     Past Surgical History:   Procedure Laterality Date    COLON SURGERY  05/2015    COLONOSCOPY  2015    bx polyps    HERNIA REPAIR      HYDROCELE EXCISION Right 04/25/2018    OTHER SURGICAL HISTORY  05/28/2015    lap converted to open right colectomy    OTHER [facility]    OTHER 06/11/19 224 lb 9.6 oz (101.9 kg)   05/13/19 221 lb 5.5 oz (100.4 kg)   05/07/19 210 lb (95.3 kg)     BP Readings from Last 3 Encounters:   06/11/19 116/78   05/13/19 97/64   05/10/19 125/76          REVIEW OF SYSTEMS:   · All other systems reviewed; please refer to HPI with pertinent positives, all other ROS are negative    PHYSICAL EXAM:    CONSTITUTIONAL:  awake, alert, no apparent distress and mildly obese  ENT:  normocepalic, without obvious abnormality  NECK:  supple, symmetrical, trachea midline   LUNGS:  Resp easy and unlabored  CARDIOVASCULAR:    ABDOMEN:  scars noted transverse upper abdomen, , soft, non-distended, non-tender, involuntary guarding absent,  Examination for hernias: broad bulge at R transverse incision, partially reducible. MUSCULOSKELETAL: No edema  NEUROLOGIC:  Mental Status Exam:  Level of Alertness:   awake  Orientation:   person, place, time      DATA:       ASSESSMENT:     Diagnosis Orders   1. Recurrent incisional hernia with incarceration       While his hernia is not currently severely symptomatic, it would certainly be likely to become so again in the future. At the last attempted repair we found extensive carcinomatosis including involving his prior ileocolic anastomosis. This required resecting the anastomosis, and thus I did not attempt to place any mesh. He has since done well from the cancer standpoint, and remains functional overall. I think it would be reasonable to attempt repeat repair of the hernia    PLAN:    We will schedule the pt for repeat hernia repair. Will attempt minimally invasive approach to assess status of metastatic disease and determine the options for repair - intraabdominal mesh placement? Open vs minimally invasive? The technical aspects, risks, benefits and complications of the procedure were discussed with the patient. The pt appears to understand, asks appropriate questions, and agrees to proceed with the procedure.        Raeann Comer OCONNOR

## 2019-06-17 NOTE — PROGRESS NOTES
Obstructive Sleep Apnea (ROBIN) Screening     Patient:  Anna Harden    YOB: 1980      Medical Record #:  [id number]                     Date:  6/17/2019     1. Are you a loud and/or regular snorer? []  Yes       [x] No    2. Have you been observed to gasp or stop breathing during sleep? []  Yes       [x] No    3. Do you feel tired or groggy upon awakening or do you awaken with a headache?           []  Yes       [x] No    4. Are you often tired or fatigued during the wake time hours? []  Yes       [x] No    5. Do you fall asleep sitting, reading, watching TV or driving? []  Yes       [x] No    6. Do you often have problems with memory or concentration? []  Yes       [x] No    **If patient's score is ? 3 they are considered high risk for ROBIN. Notify the anesthesiologist of the high risk and document in focus note. Note:  If the patient's BMI is more than 35 kg m¯² , has neck circumference > 40 cm, and/or high blood pressure the risk is greater (© American [employer]nea Association, 2006).

## 2019-06-18 ENCOUNTER — ANESTHESIA EVENT (OUTPATIENT)
Dept: OPERATING ROOM | Age: 39
DRG: 227 | End: 2019-06-18
Payer: MEDICARE

## 2019-06-19 ENCOUNTER — HOSPITAL ENCOUNTER (INPATIENT)
Age: 39
LOS: 4 days | Discharge: HOME | DRG: 227 | End: 2019-06-23
Attending: SURGERY | Admitting: SURGERY
Payer: MEDICARE

## 2019-06-19 ENCOUNTER — ANESTHESIA (OUTPATIENT)
Dept: OPERATING ROOM | Age: 39
DRG: 227 | End: 2019-06-19
Payer: MEDICARE

## 2019-06-19 VITALS
RESPIRATION RATE: 16 BRPM | SYSTOLIC BLOOD PRESSURE: 113 MMHG | TEMPERATURE: 97.2 F | OXYGEN SATURATION: 99 % | DIASTOLIC BLOOD PRESSURE: 59 MMHG

## 2019-06-19 DIAGNOSIS — K43.2 RECURRENT INCISIONAL HERNIA: ICD-10-CM

## 2019-06-19 LAB
ANION GAP SERPL CALCULATED.3IONS-SCNC: 12 MMOL/L (ref 3–16)
BASOPHILS ABSOLUTE: 0.1 K/UL (ref 0–0.2)
BASOPHILS RELATIVE PERCENT: 1.1 %
BUN BLDV-MCNC: 11 MG/DL (ref 7–20)
CALCIUM SERPL-MCNC: 8.8 MG/DL (ref 8.3–10.6)
CHLORIDE BLD-SCNC: 103 MMOL/L (ref 99–110)
CO2: 23 MMOL/L (ref 21–32)
CREAT SERPL-MCNC: 0.7 MG/DL (ref 0.9–1.3)
EOSINOPHILS ABSOLUTE: 0.3 K/UL (ref 0–0.6)
EOSINOPHILS RELATIVE PERCENT: 4.2 %
GFR AFRICAN AMERICAN: >60
GFR NON-AFRICAN AMERICAN: >60
GLUCOSE BLD-MCNC: 100 MG/DL (ref 70–99)
HCT VFR BLD CALC: 39.8 % (ref 40.5–52.5)
HEMOGLOBIN: 13.5 G/DL (ref 13.5–17.5)
LYMPHOCYTES ABSOLUTE: 2.5 K/UL (ref 1–5.1)
LYMPHOCYTES RELATIVE PERCENT: 41.7 %
MCH RBC QN AUTO: 30.5 PG (ref 26–34)
MCHC RBC AUTO-ENTMCNC: 34 G/DL (ref 31–36)
MCV RBC AUTO: 89.7 FL (ref 80–100)
MONOCYTES ABSOLUTE: 0.4 K/UL (ref 0–1.3)
MONOCYTES RELATIVE PERCENT: 7.3 %
NEUTROPHILS ABSOLUTE: 2.7 K/UL (ref 1.7–7.7)
NEUTROPHILS RELATIVE PERCENT: 45.7 %
PDW BLD-RTO: 14.3 % (ref 12.4–15.4)
PLATELET # BLD: 201 K/UL (ref 135–450)
PMV BLD AUTO: 7.5 FL (ref 5–10.5)
POTASSIUM REFLEX MAGNESIUM: 3.9 MMOL/L (ref 3.5–5.1)
RBC # BLD: 4.44 M/UL (ref 4.2–5.9)
SODIUM BLD-SCNC: 138 MMOL/L (ref 136–145)
WBC # BLD: 6 K/UL (ref 4–11)

## 2019-06-19 PROCEDURE — 0WUF4JZ SUPPLEMENT ABDOMINAL WALL WITH SYNTHETIC SUBSTITUTE, PERCUTANEOUS ENDOSCOPIC APPROACH: ICD-10-PCS | Performed by: SURGERY

## 2019-06-19 PROCEDURE — 2580000003 HC RX 258: Performed by: ANESTHESIOLOGY

## 2019-06-19 PROCEDURE — 76942 ECHO GUIDE FOR BIOPSY: CPT | Performed by: ANESTHESIOLOGY

## 2019-06-19 PROCEDURE — 6360000002 HC RX W HCPCS: Performed by: SURGERY

## 2019-06-19 PROCEDURE — 2500000003 HC RX 250 WO HCPCS: Performed by: NURSE ANESTHETIST, CERTIFIED REGISTERED

## 2019-06-19 PROCEDURE — 7100000000 HC PACU RECOVERY - FIRST 15 MIN: Performed by: SURGERY

## 2019-06-19 PROCEDURE — 88302 TISSUE EXAM BY PATHOLOGIST: CPT

## 2019-06-19 PROCEDURE — 80048 BASIC METABOLIC PNL TOTAL CA: CPT

## 2019-06-19 PROCEDURE — 2580000003 HC RX 258: Performed by: SURGERY

## 2019-06-19 PROCEDURE — S2900 ROBOTIC SURGICAL SYSTEM: HCPCS | Performed by: SURGERY

## 2019-06-19 PROCEDURE — 8E0W4CZ ROBOTIC ASSISTED PROCEDURE OF TRUNK REGION, PERCUTANEOUS ENDOSCOPIC APPROACH: ICD-10-PCS | Performed by: SURGERY

## 2019-06-19 PROCEDURE — 85025 COMPLETE CBC W/AUTO DIFF WBC: CPT

## 2019-06-19 PROCEDURE — 2500000003 HC RX 250 WO HCPCS: Performed by: SURGERY

## 2019-06-19 PROCEDURE — 3600000009 HC SURGERY ROBOT BASE: Performed by: SURGERY

## 2019-06-19 PROCEDURE — 6360000002 HC RX W HCPCS: Performed by: NURSE ANESTHETIST, CERTIFIED REGISTERED

## 2019-06-19 PROCEDURE — 6370000000 HC RX 637 (ALT 250 FOR IP): Performed by: SURGERY

## 2019-06-19 PROCEDURE — 2720000010 HC SURG SUPPLY STERILE: Performed by: SURGERY

## 2019-06-19 PROCEDURE — 7100000001 HC PACU RECOVERY - ADDTL 15 MIN: Performed by: SURGERY

## 2019-06-19 PROCEDURE — 3700000001 HC ADD 15 MINUTES (ANESTHESIA): Performed by: SURGERY

## 2019-06-19 PROCEDURE — 1200000000 HC SEMI PRIVATE

## 2019-06-19 PROCEDURE — 2709999900 HC NON-CHARGEABLE SUPPLY: Performed by: SURGERY

## 2019-06-19 PROCEDURE — 2500000003 HC RX 250 WO HCPCS: Performed by: ANESTHESIOLOGY

## 2019-06-19 PROCEDURE — C1781 MESH (IMPLANTABLE): HCPCS | Performed by: SURGERY

## 2019-06-19 PROCEDURE — 3700000000 HC ANESTHESIA ATTENDED CARE: Performed by: SURGERY

## 2019-06-19 PROCEDURE — 3600000019 HC SURGERY ROBOT ADDTL 15MIN: Performed by: SURGERY

## 2019-06-19 PROCEDURE — 6360000002 HC RX W HCPCS

## 2019-06-19 PROCEDURE — 6360000002 HC RX W HCPCS: Performed by: ANESTHESIOLOGY

## 2019-06-19 PROCEDURE — 49657 PR LAP, RECURRENT INCISIONAL HERNIA REPAIR,INCARCERATED: CPT | Performed by: SURGERY

## 2019-06-19 DEVICE — MESH HERN W6XL8IN ELLIPSE W/ ECHO PS POS SYS VENTRALIGHT ST: Type: IMPLANTABLE DEVICE | Site: ABDOMEN | Status: FUNCTIONAL

## 2019-06-19 RX ORDER — ONDANSETRON 2 MG/ML
INJECTION INTRAMUSCULAR; INTRAVENOUS PRN
Status: DISCONTINUED | OUTPATIENT
Start: 2019-06-19 | End: 2019-06-19 | Stop reason: SDUPTHER

## 2019-06-19 RX ORDER — MORPHINE SULFATE 2 MG/ML
2 INJECTION, SOLUTION INTRAMUSCULAR; INTRAVENOUS EVERY 5 MIN PRN
Status: DISCONTINUED | OUTPATIENT
Start: 2019-06-19 | End: 2019-06-23 | Stop reason: HOSPADM

## 2019-06-19 RX ORDER — ONDANSETRON 2 MG/ML
4 INJECTION INTRAMUSCULAR; INTRAVENOUS EVERY 10 MIN PRN
Status: DISCONTINUED | OUTPATIENT
Start: 2019-06-19 | End: 2019-06-19 | Stop reason: HOSPADM

## 2019-06-19 RX ORDER — SODIUM CHLORIDE 0.9 % (FLUSH) 0.9 %
10 SYRINGE (ML) INJECTION EVERY 12 HOURS SCHEDULED
Status: DISCONTINUED | OUTPATIENT
Start: 2019-06-19 | End: 2019-06-19 | Stop reason: HOSPADM

## 2019-06-19 RX ORDER — MIDAZOLAM HYDROCHLORIDE 1 MG/ML
2 INJECTION INTRAMUSCULAR; INTRAVENOUS ONCE
Status: COMPLETED | OUTPATIENT
Start: 2019-06-19 | End: 2019-06-19

## 2019-06-19 RX ORDER — SODIUM CHLORIDE, SODIUM LACTATE, POTASSIUM CHLORIDE, CALCIUM CHLORIDE 600; 310; 30; 20 MG/100ML; MG/100ML; MG/100ML; MG/100ML
INJECTION, SOLUTION INTRAVENOUS CONTINUOUS
Status: DISCONTINUED | OUTPATIENT
Start: 2019-06-19 | End: 2019-06-19

## 2019-06-19 RX ORDER — MORPHINE SULFATE 30 MG/1
30 TABLET, FILM COATED, EXTENDED RELEASE ORAL 2 TIMES DAILY
Status: DISCONTINUED | OUTPATIENT
Start: 2019-06-19 | End: 2019-06-23 | Stop reason: HOSPADM

## 2019-06-19 RX ORDER — ACETAMINOPHEN 10 MG/ML
INJECTION, SOLUTION INTRAVENOUS PRN
Status: DISCONTINUED | OUTPATIENT
Start: 2019-06-19 | End: 2019-06-19 | Stop reason: SDUPTHER

## 2019-06-19 RX ORDER — SODIUM CHLORIDE, SODIUM LACTATE, POTASSIUM CHLORIDE, AND CALCIUM CHLORIDE .6; .31; .03; .02 G/100ML; G/100ML; G/100ML; G/100ML
IRRIGANT IRRIGATION PRN
Status: DISCONTINUED | OUTPATIENT
Start: 2019-06-19 | End: 2019-06-19 | Stop reason: HOSPADM

## 2019-06-19 RX ORDER — OXYCODONE HYDROCHLORIDE AND ACETAMINOPHEN 5; 325 MG/1; MG/1
2 TABLET ORAL PRN
Status: DISCONTINUED | OUTPATIENT
Start: 2019-06-19 | End: 2019-06-19 | Stop reason: HOSPADM

## 2019-06-19 RX ORDER — LABETALOL HYDROCHLORIDE 5 MG/ML
5 INJECTION, SOLUTION INTRAVENOUS EVERY 10 MIN PRN
Status: DISCONTINUED | OUTPATIENT
Start: 2019-06-19 | End: 2019-06-19 | Stop reason: HOSPADM

## 2019-06-19 RX ORDER — MEPERIDINE HYDROCHLORIDE 50 MG/ML
12.5 INJECTION INTRAMUSCULAR; INTRAVENOUS; SUBCUTANEOUS EVERY 5 MIN PRN
Status: DISCONTINUED | OUTPATIENT
Start: 2019-06-19 | End: 2019-06-19 | Stop reason: HOSPADM

## 2019-06-19 RX ORDER — HYDROMORPHONE HCL 110MG/55ML
PATIENT CONTROLLED ANALGESIA SYRINGE INTRAVENOUS PRN
Status: DISCONTINUED | OUTPATIENT
Start: 2019-06-19 | End: 2019-06-19 | Stop reason: SDUPTHER

## 2019-06-19 RX ORDER — FENTANYL CITRATE 50 UG/ML
INJECTION, SOLUTION INTRAMUSCULAR; INTRAVENOUS PRN
Status: DISCONTINUED | OUTPATIENT
Start: 2019-06-19 | End: 2019-06-19 | Stop reason: SDUPTHER

## 2019-06-19 RX ORDER — LIDOCAINE HYDROCHLORIDE 10 MG/ML
2 INJECTION, SOLUTION INFILTRATION; PERINEURAL
Status: DISCONTINUED | OUTPATIENT
Start: 2019-06-19 | End: 2019-06-19 | Stop reason: HOSPADM

## 2019-06-19 RX ORDER — MIDAZOLAM HYDROCHLORIDE 1 MG/ML
INJECTION INTRAMUSCULAR; INTRAVENOUS
Status: COMPLETED
Start: 2019-06-19 | End: 2019-06-19

## 2019-06-19 RX ORDER — ONDANSETRON 2 MG/ML
4 INJECTION INTRAMUSCULAR; INTRAVENOUS EVERY 6 HOURS PRN
Status: DISCONTINUED | OUTPATIENT
Start: 2019-06-19 | End: 2019-06-23 | Stop reason: HOSPADM

## 2019-06-19 RX ORDER — SODIUM CHLORIDE 0.9 % (FLUSH) 0.9 %
10 SYRINGE (ML) INJECTION PRN
Status: DISCONTINUED | OUTPATIENT
Start: 2019-06-19 | End: 2019-06-19 | Stop reason: HOSPADM

## 2019-06-19 RX ORDER — DEXAMETHASONE SODIUM PHOSPHATE 4 MG/ML
INJECTION, SOLUTION INTRA-ARTICULAR; INTRALESIONAL; INTRAMUSCULAR; INTRAVENOUS; SOFT TISSUE PRN
Status: DISCONTINUED | OUTPATIENT
Start: 2019-06-19 | End: 2019-06-19 | Stop reason: SDUPTHER

## 2019-06-19 RX ORDER — OXYCODONE HYDROCHLORIDE AND ACETAMINOPHEN 5; 325 MG/1; MG/1
1 TABLET ORAL PRN
Status: DISCONTINUED | OUTPATIENT
Start: 2019-06-19 | End: 2019-06-19 | Stop reason: HOSPADM

## 2019-06-19 RX ORDER — ROCURONIUM BROMIDE 10 MG/ML
INJECTION, SOLUTION INTRAVENOUS PRN
Status: DISCONTINUED | OUTPATIENT
Start: 2019-06-19 | End: 2019-06-19 | Stop reason: SDUPTHER

## 2019-06-19 RX ORDER — DEXTROSE, SODIUM CHLORIDE, AND POTASSIUM CHLORIDE 5; .45; .15 G/100ML; G/100ML; G/100ML
1000 INJECTION INTRAVENOUS CONTINUOUS
Status: DISCONTINUED | OUTPATIENT
Start: 2019-06-19 | End: 2019-06-20

## 2019-06-19 RX ORDER — HYDROMORPHONE HCL 110MG/55ML
1 PATIENT CONTROLLED ANALGESIA SYRINGE INTRAVENOUS
Status: DISCONTINUED | OUTPATIENT
Start: 2019-06-19 | End: 2019-06-20

## 2019-06-19 RX ORDER — PROPOFOL 10 MG/ML
INJECTION, EMULSION INTRAVENOUS PRN
Status: DISCONTINUED | OUTPATIENT
Start: 2019-06-19 | End: 2019-06-19 | Stop reason: SDUPTHER

## 2019-06-19 RX ORDER — FAMOTIDINE 20 MG/1
20 TABLET, FILM COATED ORAL 2 TIMES DAILY
Status: DISCONTINUED | OUTPATIENT
Start: 2019-06-19 | End: 2019-06-23 | Stop reason: HOSPADM

## 2019-06-19 RX ORDER — SODIUM CHLORIDE 0.9 % (FLUSH) 0.9 %
10 SYRINGE (ML) INJECTION PRN
Status: DISCONTINUED | OUTPATIENT
Start: 2019-06-19 | End: 2019-06-23 | Stop reason: HOSPADM

## 2019-06-19 RX ORDER — ACETAMINOPHEN 10 MG/ML
INJECTION, SOLUTION INTRAVENOUS
Status: COMPLETED
Start: 2019-06-19 | End: 2019-06-19

## 2019-06-19 RX ORDER — MIDAZOLAM HYDROCHLORIDE 1 MG/ML
INJECTION INTRAMUSCULAR; INTRAVENOUS PRN
Status: DISCONTINUED | OUTPATIENT
Start: 2019-06-19 | End: 2019-06-19 | Stop reason: SDUPTHER

## 2019-06-19 RX ORDER — LIDOCAINE HYDROCHLORIDE 20 MG/ML
INJECTION, SOLUTION INFILTRATION; PERINEURAL PRN
Status: DISCONTINUED | OUTPATIENT
Start: 2019-06-19 | End: 2019-06-19 | Stop reason: SDUPTHER

## 2019-06-19 RX ORDER — HYDRALAZINE HYDROCHLORIDE 20 MG/ML
5 INJECTION INTRAMUSCULAR; INTRAVENOUS EVERY 10 MIN PRN
Status: DISCONTINUED | OUTPATIENT
Start: 2019-06-19 | End: 2019-06-19 | Stop reason: HOSPADM

## 2019-06-19 RX ORDER — HYDROMORPHONE HCL 110MG/55ML
0.5 PATIENT CONTROLLED ANALGESIA SYRINGE INTRAVENOUS
Status: DISCONTINUED | OUTPATIENT
Start: 2019-06-19 | End: 2019-06-20

## 2019-06-19 RX ORDER — BUPIVACAINE HYDROCHLORIDE 5 MG/ML
INJECTION, SOLUTION EPIDURAL; INTRACAUDAL PRN
Status: DISCONTINUED | OUTPATIENT
Start: 2019-06-19 | End: 2019-06-19 | Stop reason: HOSPADM

## 2019-06-19 RX ORDER — MORPHINE SULFATE 2 MG/ML
1 INJECTION, SOLUTION INTRAMUSCULAR; INTRAVENOUS EVERY 5 MIN PRN
Status: DISCONTINUED | OUTPATIENT
Start: 2019-06-19 | End: 2019-06-23 | Stop reason: HOSPADM

## 2019-06-19 RX ORDER — SODIUM CHLORIDE 0.9 % (FLUSH) 0.9 %
10 SYRINGE (ML) INJECTION EVERY 12 HOURS SCHEDULED
Status: DISCONTINUED | OUTPATIENT
Start: 2019-06-19 | End: 2019-06-23 | Stop reason: HOSPADM

## 2019-06-19 RX ADMIN — FAMOTIDINE 20 MG: 20 TABLET ORAL at 22:29

## 2019-06-19 RX ADMIN — HYDROMORPHONE HYDROCHLORIDE 0.5 MG: 1 INJECTION, SOLUTION INTRAMUSCULAR; INTRAVENOUS; SUBCUTANEOUS at 19:50

## 2019-06-19 RX ADMIN — POTASSIUM CHLORIDE, DEXTROSE MONOHYDRATE AND SODIUM CHLORIDE 1000 ML: 150; 5; 450 INJECTION, SOLUTION INTRAVENOUS at 22:29

## 2019-06-19 RX ADMIN — ROCURONIUM BROMIDE 50 MG: 10 SOLUTION INTRAVENOUS at 14:08

## 2019-06-19 RX ADMIN — ENOXAPARIN SODIUM 40 MG: 40 INJECTION SUBCUTANEOUS at 13:41

## 2019-06-19 RX ADMIN — SODIUM CHLORIDE, POTASSIUM CHLORIDE, SODIUM LACTATE AND CALCIUM CHLORIDE: 600; 310; 30; 20 INJECTION, SOLUTION INTRAVENOUS at 11:37

## 2019-06-19 RX ADMIN — Medication 1 G: at 18:15

## 2019-06-19 RX ADMIN — ROCURONIUM BROMIDE 20 MG: 10 SOLUTION INTRAVENOUS at 16:41

## 2019-06-19 RX ADMIN — ROCURONIUM BROMIDE 10 MG: 10 SOLUTION INTRAVENOUS at 17:23

## 2019-06-19 RX ADMIN — MIDAZOLAM HYDROCHLORIDE 2 MG: 1 INJECTION INTRAMUSCULAR; INTRAVENOUS at 12:24

## 2019-06-19 RX ADMIN — MORPHINE SULFATE 2 MG: 2 INJECTION, SOLUTION INTRAMUSCULAR; INTRAVENOUS at 21:41

## 2019-06-19 RX ADMIN — LIDOCAINE HYDROCHLORIDE 0.1 ML: 10 INJECTION, SOLUTION EPIDURAL; INFILTRATION; INTRACAUDAL; PERINEURAL at 11:37

## 2019-06-19 RX ADMIN — SODIUM CHLORIDE, POTASSIUM CHLORIDE, SODIUM LACTATE AND CALCIUM CHLORIDE: 600; 310; 30; 20 INJECTION, SOLUTION INTRAVENOUS at 15:20

## 2019-06-19 RX ADMIN — MIDAZOLAM HYDROCHLORIDE 2 MG: 2 INJECTION, SOLUTION INTRAMUSCULAR; INTRAVENOUS at 14:05

## 2019-06-19 RX ADMIN — MORPHINE SULFATE 30 MG: 30 TABLET, FILM COATED, EXTENDED RELEASE ORAL at 22:29

## 2019-06-19 RX ADMIN — ROCURONIUM BROMIDE 20 MG: 10 SOLUTION INTRAVENOUS at 14:38

## 2019-06-19 RX ADMIN — ROCURONIUM BROMIDE 10 MG: 10 SOLUTION INTRAVENOUS at 16:16

## 2019-06-19 RX ADMIN — Medication 10 ML: at 22:30

## 2019-06-19 RX ADMIN — SUGAMMADEX 200 MG: 100 INJECTION, SOLUTION INTRAVENOUS at 19:21

## 2019-06-19 RX ADMIN — ACETAMINOPHEN 1000 MG: 10 INJECTION, SOLUTION INTRAVENOUS at 14:13

## 2019-06-19 RX ADMIN — DEXAMETHASONE SODIUM PHOSPHATE 4 MG: 4 INJECTION, SOLUTION INTRAMUSCULAR; INTRAVENOUS at 14:39

## 2019-06-19 RX ADMIN — HYDROMORPHONE HYDROCHLORIDE 0.5 MG: 1 INJECTION, SOLUTION INTRAMUSCULAR; INTRAVENOUS; SUBCUTANEOUS at 19:41

## 2019-06-19 RX ADMIN — LIDOCAINE HYDROCHLORIDE 40 MG: 20 INJECTION, SOLUTION INFILTRATION; PERINEURAL at 14:08

## 2019-06-19 RX ADMIN — FENTANYL CITRATE 100 MCG: 50 INJECTION INTRAMUSCULAR; INTRAVENOUS at 14:06

## 2019-06-19 RX ADMIN — MIDAZOLAM 2 MG: 1 INJECTION INTRAMUSCULAR; INTRAVENOUS at 12:24

## 2019-06-19 RX ADMIN — ONDANSETRON 4 MG: 2 INJECTION INTRAMUSCULAR; INTRAVENOUS at 14:39

## 2019-06-19 RX ADMIN — HYDROMORPHONE HYDROCHLORIDE 1 MG: 2 INJECTION INTRAMUSCULAR; INTRAVENOUS; SUBCUTANEOUS at 18:51

## 2019-06-19 RX ADMIN — HYDROMORPHONE HYDROCHLORIDE 0.5 MG: 1 INJECTION, SOLUTION INTRAMUSCULAR; INTRAVENOUS; SUBCUTANEOUS at 19:58

## 2019-06-19 RX ADMIN — HYDROMORPHONE HYDROCHLORIDE 0.5 MG: 1 INJECTION, SOLUTION INTRAMUSCULAR; INTRAVENOUS; SUBCUTANEOUS at 20:04

## 2019-06-19 RX ADMIN — ROCURONIUM BROMIDE 20 MG: 10 SOLUTION INTRAVENOUS at 17:49

## 2019-06-19 RX ADMIN — PROPOFOL 180 MG: 10 INJECTION, EMULSION INTRAVENOUS at 14:08

## 2019-06-19 RX ADMIN — ROCURONIUM BROMIDE 10 MG: 10 SOLUTION INTRAVENOUS at 15:05

## 2019-06-19 RX ADMIN — HYDROMORPHONE HYDROCHLORIDE 1 MG: 2 INJECTION, SOLUTION INTRAMUSCULAR; INTRAVENOUS; SUBCUTANEOUS at 23:26

## 2019-06-19 RX ADMIN — ROCURONIUM BROMIDE 20 MG: 10 SOLUTION INTRAVENOUS at 15:35

## 2019-06-19 RX ADMIN — Medication 2 G: at 14:13

## 2019-06-19 ASSESSMENT — PULMONARY FUNCTION TESTS
PIF_VALUE: 21
PIF_VALUE: 27
PIF_VALUE: 25
PIF_VALUE: 24
PIF_VALUE: 24
PIF_VALUE: 25
PIF_VALUE: 21
PIF_VALUE: 19
PIF_VALUE: 21
PIF_VALUE: 0
PIF_VALUE: 24
PIF_VALUE: 24
PIF_VALUE: 22
PIF_VALUE: 21
PIF_VALUE: 23
PIF_VALUE: 25
PIF_VALUE: 21
PIF_VALUE: 24
PIF_VALUE: 21
PIF_VALUE: 18
PIF_VALUE: 21
PIF_VALUE: 0
PIF_VALUE: 25
PIF_VALUE: 23
PIF_VALUE: 12
PIF_VALUE: 25
PIF_VALUE: 27
PIF_VALUE: 25
PIF_VALUE: 21
PIF_VALUE: 20
PIF_VALUE: 20
PIF_VALUE: 24
PIF_VALUE: 21
PIF_VALUE: 24
PIF_VALUE: 22
PIF_VALUE: 21
PIF_VALUE: 23
PIF_VALUE: 24
PIF_VALUE: 22
PIF_VALUE: 24
PIF_VALUE: 24
PIF_VALUE: 19
PIF_VALUE: 25
PIF_VALUE: 25
PIF_VALUE: 26
PIF_VALUE: 13
PIF_VALUE: 9
PIF_VALUE: 25
PIF_VALUE: 26
PIF_VALUE: 25
PIF_VALUE: 22
PIF_VALUE: 23
PIF_VALUE: 25
PIF_VALUE: 24
PIF_VALUE: 24
PIF_VALUE: 26
PIF_VALUE: 24
PIF_VALUE: 25
PIF_VALUE: 20
PIF_VALUE: 26
PIF_VALUE: 25
PIF_VALUE: 12
PIF_VALUE: 23
PIF_VALUE: 21
PIF_VALUE: 25
PIF_VALUE: 18
PIF_VALUE: 25
PIF_VALUE: 26
PIF_VALUE: 21
PIF_VALUE: 21
PIF_VALUE: 25
PIF_VALUE: 12
PIF_VALUE: 24
PIF_VALUE: 18
PIF_VALUE: 25
PIF_VALUE: 22
PIF_VALUE: 27
PIF_VALUE: 26
PIF_VALUE: 26
PIF_VALUE: 19
PIF_VALUE: 12
PIF_VALUE: 25
PIF_VALUE: 24
PIF_VALUE: 25
PIF_VALUE: 26
PIF_VALUE: 24
PIF_VALUE: 25
PIF_VALUE: 24
PIF_VALUE: 26
PIF_VALUE: 26
PIF_VALUE: 25
PIF_VALUE: 25
PIF_VALUE: 18
PIF_VALUE: 24
PIF_VALUE: 25
PIF_VALUE: 25
PIF_VALUE: 18
PIF_VALUE: 26
PIF_VALUE: 21
PIF_VALUE: 25
PIF_VALUE: 21
PIF_VALUE: 26
PIF_VALUE: 27
PIF_VALUE: 25
PIF_VALUE: 19
PIF_VALUE: 24
PIF_VALUE: 22
PIF_VALUE: 23
PIF_VALUE: 22
PIF_VALUE: 20
PIF_VALUE: 25
PIF_VALUE: 25
PIF_VALUE: 24
PIF_VALUE: 22
PIF_VALUE: 19
PIF_VALUE: 18
PIF_VALUE: 23
PIF_VALUE: 20
PIF_VALUE: 28
PIF_VALUE: 25
PIF_VALUE: 19
PIF_VALUE: 25
PIF_VALUE: 24
PIF_VALUE: 23
PIF_VALUE: 25
PIF_VALUE: 1
PIF_VALUE: 25
PIF_VALUE: 23
PIF_VALUE: 26
PIF_VALUE: 22
PIF_VALUE: 25
PIF_VALUE: 24
PIF_VALUE: 7
PIF_VALUE: 25
PIF_VALUE: 22
PIF_VALUE: 25
PIF_VALUE: 25
PIF_VALUE: 26
PIF_VALUE: 19
PIF_VALUE: 15
PIF_VALUE: 18
PIF_VALUE: 25
PIF_VALUE: 26
PIF_VALUE: 18
PIF_VALUE: 20
PIF_VALUE: 21
PIF_VALUE: 24
PIF_VALUE: 20
PIF_VALUE: 26
PIF_VALUE: 21
PIF_VALUE: 19
PIF_VALUE: 22
PIF_VALUE: 18
PIF_VALUE: 21
PIF_VALUE: 23
PIF_VALUE: 25
PIF_VALUE: 25
PIF_VALUE: 22
PIF_VALUE: 21
PIF_VALUE: 24
PIF_VALUE: 24
PIF_VALUE: 19
PIF_VALUE: 27
PIF_VALUE: 21
PIF_VALUE: 19
PIF_VALUE: 18
PIF_VALUE: 25
PIF_VALUE: 22
PIF_VALUE: 4
PIF_VALUE: 24
PIF_VALUE: 21
PIF_VALUE: 18
PIF_VALUE: 25
PIF_VALUE: 25
PIF_VALUE: 22
PIF_VALUE: 25
PIF_VALUE: 23
PIF_VALUE: 19
PIF_VALUE: 22
PIF_VALUE: 1
PIF_VALUE: 23
PIF_VALUE: 7
PIF_VALUE: 24
PIF_VALUE: 25
PIF_VALUE: 25
PIF_VALUE: 22
PIF_VALUE: 24
PIF_VALUE: 28
PIF_VALUE: 25
PIF_VALUE: 24
PIF_VALUE: 20
PIF_VALUE: 25
PIF_VALUE: 25
PIF_VALUE: 23
PIF_VALUE: 19
PIF_VALUE: 24
PIF_VALUE: 24
PIF_VALUE: 27
PIF_VALUE: 18
PIF_VALUE: 6
PIF_VALUE: 26
PIF_VALUE: 20
PIF_VALUE: 26
PIF_VALUE: 18
PIF_VALUE: 26
PIF_VALUE: 22
PIF_VALUE: 26
PIF_VALUE: 22
PIF_VALUE: 21
PIF_VALUE: 23
PIF_VALUE: 19
PIF_VALUE: 19
PIF_VALUE: 24
PIF_VALUE: 18
PIF_VALUE: 24
PIF_VALUE: 21
PIF_VALUE: 26
PIF_VALUE: 21
PIF_VALUE: 25
PIF_VALUE: 24
PIF_VALUE: 21
PIF_VALUE: 8
PIF_VALUE: 25
PIF_VALUE: 16
PIF_VALUE: 20
PIF_VALUE: 23
PIF_VALUE: 20
PIF_VALUE: 21
PIF_VALUE: 18
PIF_VALUE: 22
PIF_VALUE: 25
PIF_VALUE: 8
PIF_VALUE: 25
PIF_VALUE: 26
PIF_VALUE: 24
PIF_VALUE: 24
PIF_VALUE: 27
PIF_VALUE: 0
PIF_VALUE: 24
PIF_VALUE: 21
PIF_VALUE: 25
PIF_VALUE: 23
PIF_VALUE: 18
PIF_VALUE: 23
PIF_VALUE: 26
PIF_VALUE: 25
PIF_VALUE: 19
PIF_VALUE: 19
PIF_VALUE: 21
PIF_VALUE: 24
PIF_VALUE: 18
PIF_VALUE: 25
PIF_VALUE: 26
PIF_VALUE: 22
PIF_VALUE: 21
PIF_VALUE: 25
PIF_VALUE: 19
PIF_VALUE: 25
PIF_VALUE: 25
PIF_VALUE: 22
PIF_VALUE: 23
PIF_VALUE: 24
PIF_VALUE: 25
PIF_VALUE: 18
PIF_VALUE: 21
PIF_VALUE: 27
PIF_VALUE: 25
PIF_VALUE: 24
PIF_VALUE: 25
PIF_VALUE: 20
PIF_VALUE: 25
PIF_VALUE: 12
PIF_VALUE: 4
PIF_VALUE: 26
PIF_VALUE: 27
PIF_VALUE: 27
PIF_VALUE: 18
PIF_VALUE: 21
PIF_VALUE: 21
PIF_VALUE: 27
PIF_VALUE: 25
PIF_VALUE: 18
PIF_VALUE: 21
PIF_VALUE: 21
PIF_VALUE: 18
PIF_VALUE: 25
PIF_VALUE: 22
PIF_VALUE: 25
PIF_VALUE: 25
PIF_VALUE: 19
PIF_VALUE: 23
PIF_VALUE: 0
PIF_VALUE: 21
PIF_VALUE: 25
PIF_VALUE: 23
PIF_VALUE: 1
PIF_VALUE: 8
PIF_VALUE: 25
PIF_VALUE: 21
PIF_VALUE: 25
PIF_VALUE: 27
PIF_VALUE: 0
PIF_VALUE: 25
PIF_VALUE: 22
PIF_VALUE: 25
PIF_VALUE: 18
PIF_VALUE: 21
PIF_VALUE: 22
PIF_VALUE: 26
PIF_VALUE: 19
PIF_VALUE: 25
PIF_VALUE: 24

## 2019-06-19 ASSESSMENT — PAIN SCALES - GENERAL
PAINLEVEL_OUTOF10: 0
PAINLEVEL_OUTOF10: 8
PAINLEVEL_OUTOF10: 7
PAINLEVEL_OUTOF10: 8
PAINLEVEL_OUTOF10: 8
PAINLEVEL_OUTOF10: 10
PAINLEVEL_OUTOF10: 7

## 2019-06-19 ASSESSMENT — PAIN - FUNCTIONAL ASSESSMENT: PAIN_FUNCTIONAL_ASSESSMENT: 0-10

## 2019-06-19 ASSESSMENT — PAIN DESCRIPTION - DESCRIPTORS: DESCRIPTORS: ACHING;CONSTANT

## 2019-06-19 NOTE — H&P
I have reviewed the history and physical and examined the patient. I find no relevant changes. I have reviewed with the patient and/or family members, during the preoperative office visit the risks, benefits, and alternatives to the procedure.     ADITYA Tripeese OCONNOR

## 2019-06-19 NOTE — ANESTHESIA PRE PROCEDURE
Department of Anesthesiology  Preprocedure Note       Name:  Nasrin Acevedo   Age:  37 y.o.  :  1980                                          MRN:  [id number]         Date:  2019      Surgeon: Osmany Polanco):  Merrick Brooks MD    Procedure: [address] 1233, POSSIBLE OPEN RECURRENT INCISIONAL HERNIA WITH JANI BLOCK FOR PAIN CONTROL (N/A )    Medications prior to admission:   Prior to Admission medications    Medication Sig Start Date End Date Taking? Authorizing Provider   oxyCODONE HCl (OXY-IR) 10 MG immediate release tablet 1 po q 4 h prn pain. 17  Yes SURYA Grey CNP   morphine (MS CONTIN) 30 MG extended release tablet Take 1 tablet by mouth 2 times daily . Patient taking differently: Take 30 mg by mouth every 12 hours as needed.  . 17  Yes SURYA Grey CNP       Current medications:    Current Facility-Administered Medications   Medication Dose Route Frequency Provider Last Rate Last Dose    lactated ringers infusion   Intravenous Continuous Julissa Resendez MD        lidocaine 1 % (PF) injection 0.1 mL  0.1 mL Intradermal Once PRN Julissa Resendez MD        ceFAZolin (ANCEF) 2 g in sterile water 20 mL IV syringe  2 g Intravenous On Call to [address] [place] [address], MD        sodium chloride flush 0.9 % injection 10 mL  10 mL Intravenous 2 times per day Merrick Brooks MD        sodium chloride flush 0.9 % injection 10 mL  10 mL Intravenous PRN Merrick Brooks MD        enoxaparin (LOVENOX) injection 40 mg  40 mg Subcutaneous Once Merrick Brooks MD           Allergies:  No Known Allergies    Problem List:    Patient Active Problem List   Diagnosis Code    Pneumonia J18.9    BANDEMIA     LFT elevation R94.5    Smoker F17.200    Alcohol consumption heavy Z78.9    Sepsis ([address] A41.9    Bilateral pneumonia J18.9    Acute respiratory failure (HCC) J96.00    Pneumonia J18.9    Abdominal pain R10.9    Colitis K52.9  Ileus ([address] K56.7    Alcohol dependence ([facility] F10.20    Alcohol withdrawal (HCC) F10.239    Tobacco abuse Z72.0    Postoperative wound seroma [id number]    Encounter for long-term (current) use of high-risk medication Z79.899    Cancer of right colon ([facility] C18.2    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.21    Delirium due to another medical condition F05    Adjustment disorder with anxious mood F43.22    Epigastric pain R10.13    Acute pancreatitis K85.90    Leukocytosis D72.829    Suprapubic pain R10.2    Right lower quadrant abdominal pain R10.31    Pain management R52    Chronic alcoholic pancreatitis (HCC) K86.0    Bowel obstruction ([address] K56.609    Recurrent incisional hernia with incarceration K43.0    Obesity E66.9       Past Medical History:        Diagnosis Date    Acute pancreatitis     Cancer ([address] 05/2015    colon, COLECTOMY, CHEMO, IMMUNE THERAPY    Cerebral artery occlusion with cerebral infarction (HCC)     GERD (gastroesophageal reflux disease)     Hyperlipidemia     Hypertension     Pneumonia     Thyroid disease     VRE infection (vancomycin resistant Enterococcus) 07/14/2016    abscess, ABD       Past Surgical History:        Procedure Laterality Date    COLON SURGERY  05/2015    COLONOSCOPY  2015    bx polyps    HERNIA REPAIR      HYDROCELE EXCISION Right 04/25/2018    OTHER SURGICAL HISTORY  05/28/2015    lap converted to open right colectomy    OTHER SURGICAL HISTORY Left 06/2015    mediport    OTHER SURGICAL HISTORY  07/06/2016    hernia repair and bowel resection       Social History:    Social History Tobacco Use    Smoking status: Current Every Day Smoker     Packs/day: 0.50     Years: 10.00     Pack years: 5.00     Types: Cigarettes    Smokeless tobacco: Never Used    Tobacco comment: currently smoking 1/2 ppd   Substance Use Topics    Alcohol use: Yes     Comment: RARELY, was a heavy drinker                                Ready to quit: Not Answered  Counseling given: Not Answered  Comment: currently smoking 1/2 ppd      Vital Signs (Current):   Vitals:    06/19/19 1118 06/19/19 1133   BP:  118/74   Pulse:  84   Resp:  14   Temp:  97.3 °F (36.3 °C)   TempSrc:  Temporal   SpO2:  97%   Weight: 224 lb 10.4 oz (101.9 kg) 223 lb (101.2 kg)   Height: 5' 10.08\" (1.78 m) 5' 10\" (1.778 m)                                              BP Readings from Last 3 Encounters:   06/19/19 118/74   06/11/19 116/78   05/13/19 97/64       NPO Status:                                                                                 BMI:   Wt Readings from Last 3 Encounters:   06/19/19 223 lb (101.2 kg)   06/11/19 224 lb 9.6 oz (101.9 kg)   05/13/19 221 lb 5.5 oz (100.4 kg)     Body mass index is 32 kg/m².     CBC:   Lab Results   Component Value Date    WBC 9.6 05/13/2019    RBC 4.20 05/13/2019    RBC 5.12 08/07/2017    HGB 12.9 05/13/2019    HCT 37.7 05/13/2019    MCV 89.7 05/13/2019    RDW 13.6 05/13/2019     05/13/2019       CMP:   Lab Results   Component Value Date     05/13/2019    K 3.2 05/13/2019     05/13/2019    CO2 24 05/13/2019    BUN 8 05/13/2019    CREATININE 0.7 05/13/2019    GFRAA >60 05/13/2019    GFRAA >60 05/30/2013    AGRATIO 1.1 05/12/2019    LABGLOM >60 05/13/2019    GLUCOSE 121 05/13/2019    GLUCOSE 107 08/07/2017    PROT 8.5 05/12/2019    PROT 7.8 08/07/2017    CALCIUM 8.5 05/13/2019    BILITOT 0.9 05/12/2019    ALKPHOS 133 05/12/2019    AST 32 05/12/2019    ALT 34 05/12/2019       POC Tests: No results for input(s): POCGLU, POCNA, POCK, POCCL, POCBUN, POCHEMO, POCHCT in the last 72 hours.    Coags:   Lab Results   Component Value Date    PROTIME 13.3 05/12/2019    INR 1.17 05/12/2019       HCG (If Applicable): No results found for: PREGTESTUR, PREGSERUM, HCG, HCGQUANT     ABGs:   Lab Results   Component Value Date    PHART 7.373 05/28/2015    PO2ART 56.5 05/28/2015    WUL6AKG 44.4 05/28/2015    MLE8UZN 25.3 05/28/2015    BEART -0.2 05/28/2015    Z2APHNPB 90.1 05/28/2015        Type & Screen (If Applicable):  No results found for: LABABO, [address]    Anesthesia Evaluation  Patient summary reviewed no history of anesthetic complications:   Airway: Mallampati: II  TM distance: >3 FB   Neck ROM: full  Mouth opening: > = 3 FB Dental: normal exam         Pulmonary:   (+) pneumonia:                             Cardiovascular:    (+) hypertension:,                   Neuro/Psych:   (+) psychiatric history (h/o ETOH abuse):   (-) CVA           GI/Hepatic/Renal:   (+) GERD:,      (-) liver disease and no renal disease       Endo/Other:    (+) malignancy/cancer (colon). Abdominal:           Vascular: negative vascular ROS. Anesthesia Plan      general and regional     ASA 3     (TAP blocks)  Induction: intravenous. MIPS: Postoperative opioids intended and Prophylactic antiemetics administered. Anesthetic plan and risks discussed with patient. Plan discussed with CRNA. All questions answered and agrees with plan.         Sade Ramsay MD   6/19/2019

## 2019-06-19 NOTE — PROGRESS NOTES
4 Eyes Skin Assessment     The patient is being assess for   Admission    I agree that 2 RN's have performed a thorough Head to Toe Skin Assessment on the patient. ALL assessment sites listed below have been assessed. Areas assessed by both nurses:   [x]   Head, Face, and Ears   [x]   Shoulders, Back, and Chest, Abdomen  [x]   Arms, Elbows, and Hands   [x]   Coccyx, Sacrum, and Ischium  [x]   Legs, Feet, and Heels            **SHARE this note so that the co-signing nurse is able to place an eSignature**    Co-signer eSignature: Electronically signed by Artemio Reno RN on 6/19/19 at 12:11 PM    Does the Patient have Skin Breakdown?   No          Kevin Prevention initiated:  No   Wound Care Orders initiated:  No      [facility] nurse consulted for Pressure Injury (Stage 3,4, Unstageable, DTI, NWPT, Complex wounds)and New or Established Ostomies:  No      Primary Nurse eSignature: Electronically signed by Crissy Pope RN on 6/19/19 at 11:57 AM

## 2019-06-19 NOTE — PROGRESS NOTES
Patient states \"feels like Im crawling out of my skin. \" Call placed to Dr. Brewer Blind. Versed 2 mg ordered.

## 2019-06-19 NOTE — ANESTHESIA PROCEDURE NOTES
Peripheral Block    Patient location during procedure: pre-op  Start time: 6/19/2019 7:15 PM  End time: 6/19/2019 7:16 PM  Staffing  Anesthesiologist: Paresh Hendrix MD  Resident/CRNA: SURYA Davidson CRNA  Performed: resident/CRNA   Preanesthetic Checklist  Completed: patient identified, site marked, surgical consent, pre-op evaluation, timeout performed, IV checked, risks and benefits discussed, monitors and equipment checked, anesthesia consent given, oxygen available and patient being monitored  Peripheral Block  Patient position: supine  Prep: ChloraPrep  Patient monitoring: cardiac monitor, continuous pulse ox, frequent blood pressure checks and IV access  Block type: TAP  Laterality: bilateral  Injection technique: single-shot  Procedures: ultrasound guided and nerve stimulator  Infiltration strength: 1 %  Dose: 3 mL  Provider prep: mask and sterile gloves  Needle  Needle gauge: 21 G  Needle length: 10 cm  Needle localization: ultrasound guidance and nerve stimulator  Assessment  Injection assessment: negative aspiration for heme, no paresthesia on injection and local visualized surrounding nerve on ultrasound  Paresthesia pain: none  Slow fractionated injection: yes  Hemodynamics: stable  Additional Notes  Immediately prior to procedure a \"time out\" was called to verify the correct patient, allergies, laterality, procedure and equipment. Time out performed with MUNDO Kirk    Local Anesthetic: 0.25%  Bupivacaine 30ml   Per side in 5 ml increments after negative aspiration each time.         Reason for block: post-op pain management and at surgeon's request

## 2019-06-19 NOTE — BRIEF OP NOTE
Brief Postoperative Note  ______________________________________________________________    Patient: Daniela Mercedes  YOB: 1980  MRN: [id number]  Date of Procedure: 6/19/2019    Pre-Op Diagnosis: RECURRENT INCISIONAL HERNIA WITH INCARCERATION    Post-Op Diagnosis: Same       Procedure(s):  DAVINCI INCISIONAL HERNIA  REPAIR  OF RECURRENT INCISIONAL HERNIA WITH JANI BLOCK FOR PAIN CONTROL    Anesthesia: Regional, General    Surgeon(s):  Gill Crane MD    Assistant: Marta Hernandez    Estimated Blood Loss (mL): 50    Complications: None    Specimens:   ID Type Source Tests Collected by Time Destination   A : [facility] [facility] SURG[facility]OLOGY Gill Crane MD 6/19/2019 1640        Implants:  Implant Name Type Inv.  Item Serial No.  Lot No. LRB No. Used   MESH VENTRALIGHT ST W/ECHO PS ELLIPSE 6X8IN Mesh MESH VENTRALIGHT ST W/ECHO PS ELLIPSE 6X8IN  CR "[employer] INC [id number] N/A 1         Drains:   Urethral Catheter Non-latex 16 fr (Active)       Findings: large R-sided fascial defect (~8x10cm) w/ associated ~2x2cm medial defect                Incarcerated ileocolic anastomosis in hernia, released robotically                  Open excision of hernia sac and fascial defect closure                 Robotic [id number] Ventriolight mesh placement    Job#: [id number]    eDnise De Santiago MD  Date: 6/21/2019  Time: 9:23 PM

## 2019-06-20 LAB
BASOPHILS ABSOLUTE: 0.1 K/UL (ref 0–0.2)
BASOPHILS RELATIVE PERCENT: 0.3 %
EOSINOPHILS ABSOLUTE: 0 K/UL (ref 0–0.6)
EOSINOPHILS RELATIVE PERCENT: 0 %
HCT VFR BLD CALC: 42.7 % (ref 40.5–52.5)
HEMOGLOBIN: 14.5 G/DL (ref 13.5–17.5)
LYMPHOCYTES ABSOLUTE: 1.2 K/UL (ref 1–5.1)
LYMPHOCYTES RELATIVE PERCENT: 7.3 %
MCH RBC QN AUTO: 30.2 PG (ref 26–34)
MCHC RBC AUTO-ENTMCNC: 33.8 G/DL (ref 31–36)
MCV RBC AUTO: 89.3 FL (ref 80–100)
MONOCYTES ABSOLUTE: 0.5 K/UL (ref 0–1.3)
MONOCYTES RELATIVE PERCENT: 3.1 %
NEUTROPHILS ABSOLUTE: 15.1 K/UL (ref 1.7–7.7)
NEUTROPHILS RELATIVE PERCENT: 89.3 %
PDW BLD-RTO: 14.5 % (ref 12.4–15.4)
PLATELET # BLD: 265 K/UL (ref 135–450)
PMV BLD AUTO: 7.8 FL (ref 5–10.5)
RBC # BLD: 4.79 M/UL (ref 4.2–5.9)
WBC # BLD: 16.9 K/UL (ref 4–11)

## 2019-06-20 PROCEDURE — 99024 POSTOP FOLLOW-UP VISIT: CPT | Performed by: SURGERY

## 2019-06-20 PROCEDURE — 2580000003 HC RX 258: Performed by: SURGERY

## 2019-06-20 PROCEDURE — 6370000000 HC RX 637 (ALT 250 FOR IP): Performed by: SURGERY

## 2019-06-20 PROCEDURE — 85025 COMPLETE CBC W/AUTO DIFF WBC: CPT

## 2019-06-20 PROCEDURE — 6360000002 HC RX W HCPCS: Performed by: ANESTHESIOLOGY

## 2019-06-20 PROCEDURE — 1200000000 HC SEMI PRIVATE

## 2019-06-20 PROCEDURE — 6360000002 HC RX W HCPCS: Performed by: SURGERY

## 2019-06-20 PROCEDURE — 2500000003 HC RX 250 WO HCPCS: Performed by: SURGERY

## 2019-06-20 RX ORDER — NALOXONE HYDROCHLORIDE 0.4 MG/ML
0.4 INJECTION, SOLUTION INTRAMUSCULAR; INTRAVENOUS; SUBCUTANEOUS PRN
Status: DISCONTINUED | OUTPATIENT
Start: 2019-06-20 | End: 2019-06-20

## 2019-06-20 RX ORDER — HYDROMORPHONE HCL 110MG/55ML
1 PATIENT CONTROLLED ANALGESIA SYRINGE INTRAVENOUS ONCE
Status: COMPLETED | OUTPATIENT
Start: 2019-06-20 | End: 2019-06-20

## 2019-06-20 RX ORDER — HYDROMORPHONE HCL 110MG/55ML
2 PATIENT CONTROLLED ANALGESIA SYRINGE INTRAVENOUS
Status: DISCONTINUED | OUTPATIENT
Start: 2019-06-20 | End: 2019-06-23 | Stop reason: HOSPADM

## 2019-06-20 RX ORDER — HYDROMORPHONE HCL 110MG/55ML
1 PATIENT CONTROLLED ANALGESIA SYRINGE INTRAVENOUS
Status: DISCONTINUED | OUTPATIENT
Start: 2019-06-20 | End: 2019-06-23 | Stop reason: HOSPADM

## 2019-06-20 RX ORDER — KETOROLAC TROMETHAMINE 30 MG/ML
30 INJECTION, SOLUTION INTRAMUSCULAR; INTRAVENOUS EVERY 6 HOURS
Status: COMPLETED | OUTPATIENT
Start: 2019-06-20 | End: 2019-06-23

## 2019-06-20 RX ORDER — DEXTROSE, SODIUM CHLORIDE, AND POTASSIUM CHLORIDE 5; .45; .15 G/100ML; G/100ML; G/100ML
1000 INJECTION INTRAVENOUS CONTINUOUS
Status: DISCONTINUED | OUTPATIENT
Start: 2019-06-20 | End: 2019-06-21

## 2019-06-20 RX ADMIN — HYDROMORPHONE HYDROCHLORIDE 2 MG: 2 INJECTION, SOLUTION INTRAMUSCULAR; INTRAVENOUS; SUBCUTANEOUS at 18:20

## 2019-06-20 RX ADMIN — Medication 10 ML: at 09:30

## 2019-06-20 RX ADMIN — FAMOTIDINE 20 MG: 20 TABLET ORAL at 09:35

## 2019-06-20 RX ADMIN — HYDROMORPHONE HYDROCHLORIDE 2 MG: 2 INJECTION, SOLUTION INTRAMUSCULAR; INTRAVENOUS; SUBCUTANEOUS at 09:32

## 2019-06-20 RX ADMIN — MORPHINE SULFATE 2 MG: 2 INJECTION, SOLUTION INTRAMUSCULAR; INTRAVENOUS at 08:35

## 2019-06-20 RX ADMIN — KETOROLAC TROMETHAMINE 30 MG: 30 INJECTION, SOLUTION INTRAMUSCULAR at 13:45

## 2019-06-20 RX ADMIN — FAMOTIDINE 20 MG: 20 TABLET ORAL at 20:15

## 2019-06-20 RX ADMIN — HYDROMORPHONE HYDROCHLORIDE 2 MG: 2 INJECTION, SOLUTION INTRAMUSCULAR; INTRAVENOUS; SUBCUTANEOUS at 15:34

## 2019-06-20 RX ADMIN — HYDROMORPHONE HYDROCHLORIDE 1 MG: 2 INJECTION, SOLUTION INTRAMUSCULAR; INTRAVENOUS; SUBCUTANEOUS at 07:09

## 2019-06-20 RX ADMIN — HYDROMORPHONE HYDROCHLORIDE 2 MG: 2 INJECTION, SOLUTION INTRAMUSCULAR; INTRAVENOUS; SUBCUTANEOUS at 21:53

## 2019-06-20 RX ADMIN — HYDROMORPHONE HYDROCHLORIDE 2 MG: 2 INJECTION, SOLUTION INTRAMUSCULAR; INTRAVENOUS; SUBCUTANEOUS at 12:36

## 2019-06-20 RX ADMIN — Medication 10 ML: at 20:15

## 2019-06-20 RX ADMIN — HYDROMORPHONE HYDROCHLORIDE 1 MG: 2 INJECTION, SOLUTION INTRAMUSCULAR; INTRAVENOUS; SUBCUTANEOUS at 02:44

## 2019-06-20 RX ADMIN — MORPHINE SULFATE 30 MG: 30 TABLET, FILM COATED, EXTENDED RELEASE ORAL at 20:15

## 2019-06-20 RX ADMIN — DEXTROSE MONOHYDRATE, SODIUM CHLORIDE, AND POTASSIUM CHLORIDE 1000 ML: 50; 4.5; 1.49 INJECTION, SOLUTION INTRAVENOUS at 11:00

## 2019-06-20 RX ADMIN — KETOROLAC TROMETHAMINE 30 MG: 30 INJECTION, SOLUTION INTRAMUSCULAR at 20:14

## 2019-06-20 RX ADMIN — ENOXAPARIN SODIUM 40 MG: 40 INJECTION SUBCUTANEOUS at 09:30

## 2019-06-20 RX ADMIN — KETOROLAC TROMETHAMINE 30 MG: 30 INJECTION, SOLUTION INTRAMUSCULAR at 07:58

## 2019-06-20 RX ADMIN — HYDROMORPHONE HYDROCHLORIDE 1 MG: 2 INJECTION, SOLUTION INTRAMUSCULAR; INTRAVENOUS; SUBCUTANEOUS at 06:24

## 2019-06-20 RX ADMIN — MORPHINE SULFATE 30 MG: 30 TABLET, FILM COATED, EXTENDED RELEASE ORAL at 09:30

## 2019-06-20 ASSESSMENT — PAIN SCALES - GENERAL
PAINLEVEL_OUTOF10: 10
PAINLEVEL_OUTOF10: 7
PAINLEVEL_OUTOF10: 8
PAINLEVEL_OUTOF10: 8
PAINLEVEL_OUTOF10: 10
PAINLEVEL_OUTOF10: 7
PAINLEVEL_OUTOF10: 10
PAINLEVEL_OUTOF10: 9
PAINLEVEL_OUTOF10: 8
PAINLEVEL_OUTOF10: 7
PAINLEVEL_OUTOF10: 8
PAINLEVEL_OUTOF10: 10
PAINLEVEL_OUTOF10: 7
PAINLEVEL_OUTOF10: 0
PAINLEVEL_OUTOF10: 8

## 2019-06-20 ASSESSMENT — PAIN DESCRIPTION - DESCRIPTORS: DESCRIPTORS: CONSTANT;ACHING;STABBING

## 2019-06-20 ASSESSMENT — PAIN DESCRIPTION - FREQUENCY: FREQUENCY: CONTINUOUS

## 2019-06-20 ASSESSMENT — PAIN DESCRIPTION - PAIN TYPE: TYPE: CHRONIC PAIN

## 2019-06-20 ASSESSMENT — PAIN DESCRIPTION - LOCATION: LOCATION: ABDOMEN

## 2019-06-20 NOTE — PROGRESS NOTES
4 Eyes Skin Assessment     The patient is being assess for   Post-Op Surgical    I agree that 2 RN's have performed a thorough Head to Toe Skin Assessment on the patient. ALL assessment sites listed below have been assessed. Areas assessed by both nurses:   [x]   Head, Face, and Ears   [x]   Shoulders, Back, and Chest, Abdomen  [x]   Arms, Elbows, and Hands   [x]   Coccyx, Sacrum, and Ischium  [x]   Legs, Feet, and Heels      **SHARE this note so that the co-signing nurse is able to place an eSignature**    Co-signer eSignature: Electronically signed by Giancarlo Valladares RN on 6/19/19 at 9:53 PM    Does the Patient have Skin Breakdown?   No          Kevin Prevention initiated:  No   Wound Care Orders initiated:  No      [facility] nurse consulted for Pressure Injury (Stage 3,4, Unstageable, DTI, NWPT, Complex wounds)and New or Established Ostomies:  No      Primary Nurse eSignature: Electronically signed by Ray Hernandez RN on 6/20/19 at 1:44 AM

## 2019-06-20 NOTE — CARE COORDINATION
CASE MANAGEMENT INITIAL ASSESSMENT      Reviewed chart and met with patient today, re: 37 y.o. Male diagnosis  Explained Case Management role/services. Family present: none  Primary contact information: Phalen Linna Gains date/status: 6/19/19  Diagnosis:  recurrent incisional hernia    Insurance: [employer]  Precert required for SNF - Y     3 night stay required - N    Living arrangements, Adls, care needs, prior to admission: lives in apartment with 2 kids. 12 and 16. Transportation: [facility]    [address] [facility] at home: Walker__Cane__RTS__ BSC__Shower Chair__  02__ HHN__ CPAP__  BiPap__  Hospital Bed__ W/C___ Other__________    Services in the home and/or outpatient, prior to admission: none      PT/OT recs: none    Hospital Exemption Notification (HEN): needed for SNF    Barriers to discharge: none    Plan/comments: Patient plans on returning home at discharge. IPTA, drives. Will be able to get to follow up appointments. Denies and DCP needs.  Anisha Benton RN

## 2019-06-20 NOTE — PROGRESS NOTES
[facility] GENERAL SURGERY    Surgery Progress Note           POD # 1    PATIENT NAME: Dominick Yeung     TODAY'S DATE: 6/20/2019    INTERVAL HISTORY:    Pt  Had extreme pain this morning, but feeling better now w/ adjusted pain meds. OBJECTIVE:   VITALS:  /78   Pulse 87   Temp 99.2 °F (37.3 °C) (Oral)   Resp 16   Ht 5' 10\" (1.778 m)   Wt 209 lb 14.1 oz (95.2 kg) Comment: removed SCD pump before weighing  SpO2 98%   BMI 30.11 kg/m²     INTAKE/OUTPUT:    I/O last 3 completed shifts: In: 3116 [P.O.:620; I.V.:2496]  Out: 1800 [Urine:1800]  No intake/output data recorded. CONSTITUTIONAL:  awake and alert  LUNGS:     ABDOMEN:    , soft, non-distended, tenderness noted on R side and L side   INCISION: clean, dry, no drainage, healing    Data:  CBC:   Recent Labs     06/19/19  1132 06/20/19  0630   WBC 6.0 16.9*   HGB 13.5 14.5   HCT 39.8* 42.7    265     BMP:    Recent Labs     06/19/19  1132      K 3.9      CO2 23   BUN 11   CREATININE 0.7*   GLUCOSE 100*     Hepatic: No results for input(s): AST, ALT, ALB, BILITOT, ALKPHOS in the last 72 hours. Mag:    No results for input(s): MG in the last 72 hours. Phos:   No results for input(s): PHOS in the last 72 hours. INR: No results for input(s): INR in the last 72 hours.       Radiology Review:       ASSESSMENT AND PLAN:  37 y.o. male status post recurrent incisional hernia repair   - adjust pain meds to keep him comfortable   - diet as tolerated   - up OOB to chair   - likely will need 1-2 more days for pain control           Electronically signed by Marge Phillips MD

## 2019-06-20 NOTE — PROGRESS NOTES
Paged Dr. Beba Jordan again about 0700 : \"Pt is threatening to leave AMA. cussing and screaming. Stating he will just leave and drive himself home. Please call ASAP. \"     Beba Jordan return call about 21 [phone]: ordered 1 mg of dilaudid once.

## 2019-06-20 NOTE — PROGRESS NOTES
Paged Dr. Saray Ellis: \"Pt is cussing and screaming and crying in pain. Per pt the pain is very serve in the Left side of his abd and just started about an hour ago. IV dilaudid given, with no relieve. wood completed a hernia repair last night on this pt. \"

## 2019-06-20 NOTE — PLAN OF CARE
Problem: Pain:  Goal: Pain level will decrease  Description  Pain level will decrease  Outcome: Ongoing  Note:   Pt c/o 8/10 for abd pain, prn pain meds given, see MAR. Will continue to monitor and reassess as needed.

## 2019-06-21 PROCEDURE — 6370000000 HC RX 637 (ALT 250 FOR IP): Performed by: CLINICAL NURSE SPECIALIST

## 2019-06-21 PROCEDURE — 2500000003 HC RX 250 WO HCPCS: Performed by: SURGERY

## 2019-06-21 PROCEDURE — 6360000002 HC RX W HCPCS: Performed by: SURGERY

## 2019-06-21 PROCEDURE — 99024 POSTOP FOLLOW-UP VISIT: CPT | Performed by: SURGERY

## 2019-06-21 PROCEDURE — 6370000000 HC RX 637 (ALT 250 FOR IP): Performed by: SURGERY

## 2019-06-21 PROCEDURE — APPSS30 APP SPLIT SHARED TIME 16-30 MINUTES: Performed by: CLINICAL NURSE SPECIALIST

## 2019-06-21 PROCEDURE — 1200000000 HC SEMI PRIVATE

## 2019-06-21 PROCEDURE — 2580000003 HC RX 258: Performed by: SURGERY

## 2019-06-21 RX ORDER — OXYCODONE HYDROCHLORIDE AND ACETAMINOPHEN 5; 325 MG/1; MG/1
2 TABLET ORAL EVERY 4 HOURS PRN
Status: DISCONTINUED | OUTPATIENT
Start: 2019-06-21 | End: 2019-06-23 | Stop reason: HOSPADM

## 2019-06-21 RX ADMIN — HYDROMORPHONE HYDROCHLORIDE 2 MG: 2 INJECTION, SOLUTION INTRAMUSCULAR; INTRAVENOUS; SUBCUTANEOUS at 00:52

## 2019-06-21 RX ADMIN — KETOROLAC TROMETHAMINE 30 MG: 30 INJECTION, SOLUTION INTRAMUSCULAR at 14:07

## 2019-06-21 RX ADMIN — FAMOTIDINE 20 MG: 20 TABLET ORAL at 21:01

## 2019-06-21 RX ADMIN — DEXTROSE MONOHYDRATE, SODIUM CHLORIDE, AND POTASSIUM CHLORIDE 1000 ML: 50; 4.5; 1.49 INJECTION, SOLUTION INTRAVENOUS at 08:27

## 2019-06-21 RX ADMIN — HYDROMORPHONE HYDROCHLORIDE 2 MG: 2 INJECTION, SOLUTION INTRAMUSCULAR; INTRAVENOUS; SUBCUTANEOUS at 11:10

## 2019-06-21 RX ADMIN — MORPHINE SULFATE 30 MG: 30 TABLET, FILM COATED, EXTENDED RELEASE ORAL at 08:37

## 2019-06-21 RX ADMIN — HYDROMORPHONE HYDROCHLORIDE 2 MG: 2 INJECTION, SOLUTION INTRAMUSCULAR; INTRAVENOUS; SUBCUTANEOUS at 14:07

## 2019-06-21 RX ADMIN — HYDROMORPHONE HYDROCHLORIDE 2 MG: 2 INJECTION, SOLUTION INTRAMUSCULAR; INTRAVENOUS; SUBCUTANEOUS at 08:36

## 2019-06-21 RX ADMIN — OXYCODONE HYDROCHLORIDE AND ACETAMINOPHEN 2 TABLET: 5; 325 TABLET ORAL at 12:28

## 2019-06-21 RX ADMIN — HYDROMORPHONE HYDROCHLORIDE 2 MG: 2 INJECTION, SOLUTION INTRAMUSCULAR; INTRAVENOUS; SUBCUTANEOUS at 06:27

## 2019-06-21 RX ADMIN — HYDROMORPHONE HYDROCHLORIDE 2 MG: 2 INJECTION, SOLUTION INTRAMUSCULAR; INTRAVENOUS; SUBCUTANEOUS at 21:02

## 2019-06-21 RX ADMIN — HYDROMORPHONE HYDROCHLORIDE 2 MG: 2 INJECTION, SOLUTION INTRAMUSCULAR; INTRAVENOUS; SUBCUTANEOUS at 18:14

## 2019-06-21 RX ADMIN — HYDROMORPHONE HYDROCHLORIDE 2 MG: 2 INJECTION, SOLUTION INTRAMUSCULAR; INTRAVENOUS; SUBCUTANEOUS at 03:38

## 2019-06-21 RX ADMIN — KETOROLAC TROMETHAMINE 30 MG: 30 INJECTION, SOLUTION INTRAMUSCULAR at 21:01

## 2019-06-21 RX ADMIN — FAMOTIDINE 20 MG: 20 TABLET ORAL at 08:37

## 2019-06-21 RX ADMIN — Medication 10 ML: at 21:02

## 2019-06-21 RX ADMIN — ENOXAPARIN SODIUM 40 MG: 40 INJECTION SUBCUTANEOUS at 08:37

## 2019-06-21 RX ADMIN — MORPHINE SULFATE 30 MG: 30 TABLET, FILM COATED, EXTENDED RELEASE ORAL at 21:01

## 2019-06-21 RX ADMIN — KETOROLAC TROMETHAMINE 30 MG: 30 INJECTION, SOLUTION INTRAMUSCULAR at 00:54

## 2019-06-21 RX ADMIN — KETOROLAC TROMETHAMINE 30 MG: 30 INJECTION, SOLUTION INTRAMUSCULAR at 08:36

## 2019-06-21 ASSESSMENT — PAIN SCALES - GENERAL
PAINLEVEL_OUTOF10: 8
PAINLEVEL_OUTOF10: 7
PAINLEVEL_OUTOF10: 8

## 2019-06-21 ASSESSMENT — PAIN DESCRIPTION - DESCRIPTORS: DESCRIPTORS: CONSTANT;ACHING

## 2019-06-21 ASSESSMENT — PAIN DESCRIPTION - LOCATION
LOCATION: ABDOMEN

## 2019-06-21 ASSESSMENT — PAIN DESCRIPTION - ORIENTATION
ORIENTATION: ANTERIOR
ORIENTATION: ANTERIOR

## 2019-06-21 ASSESSMENT — PAIN DESCRIPTION - PAIN TYPE
TYPE: CHRONIC PAIN;SURGICAL PAIN

## 2019-06-21 ASSESSMENT — PAIN DESCRIPTION - FREQUENCY: FREQUENCY: CONTINUOUS

## 2019-06-21 NOTE — ANESTHESIA POSTPROCEDURE EVALUATION
Department of Anesthesiology  Postprocedure Note    Patient: Mary Reis  MRN: [id number]  YOB: 1980  Date of evaluation: 6/21/2019  Time:  6:55 PM     Procedure Summary     Date:  06/19/19 Room / Location:  [facility] OR 02 / [facility] OR    Anesthesia Start:  1405 Anesthesia Stop:  1931    Procedure:  Almetta Alex HERNIA  REPAIR  OF RECURRENT INCISIONAL HERNIA WITH JANI BLOCK FOR PAIN CONTROL (N/A ) Diagnosis:       Recurrent incisional hernia      (RECURRENT INCISIONAL HERNIA WITH INCARCERATION)    Surgeon:  Cherelle Magallon MD Responsible Provider:  Shavon Stanley MD    Anesthesia Type:  general, regional ASA Status:  3          Anesthesia Type: general, regional    Tanya Phase I: Tanya Score: 8    Tanya Phase II:      Last vitals: Reviewed and per EMR flowsheets.        Anesthesia Post Evaluation    Patient location during evaluation: PACU  Level of consciousness: anxious  Airway patency: patent  Nausea & Vomiting: no nausea  Complications: no  Cardiovascular status: blood pressure returned to baseline  Respiratory status: acceptable  Hydration status: euvolemic

## 2019-06-21 NOTE — PROGRESS NOTES
Pulled one vial dilaudid from [email or web address] from this patient. Gave . 5 mg to another patient. Wasted 1.5 mg with Cosmo Riojas RN. Notified Pharmacy.

## 2019-06-21 NOTE — PROGRESS NOTES
[facility] GENERAL SURGERY    Surgery Progress Note           POD # 2    PATIENT NAME: Jaiden Zapata     TODAY'S DATE: 6/21/2019    INTERVAL HISTORY:    Pt reports pain is much improved and controlled with current meds. Eating okay     OBJECTIVE:   VITALS:  /74   Pulse 84   Temp 98.5 °F (36.9 °C) (Oral)   Resp 20   Ht 5' 10\" (1.778 m)   Wt 209 lb 14.1 oz (95.2 kg) Comment: removed SCD pump before weighing  SpO2 96%   BMI 30.11 kg/m²     INTAKE/OUTPUT:    I/O last 3 completed shifts: In: 4337 [P.O.:2830; I.V.:1462]  Out: -   No intake/output data recorded. CONSTITUTIONAL:  awake and alert  ABDOMEN:soft, non-distended, tenderness noted as appropriate   INCISION: clean, dry, no drainage,    Data:  CBC:   Recent Labs     06/19/19  1132 06/20/19  0630   WBC 6.0 16.9*   HGB 13.5 14.5   HCT 39.8* 42.7    265     BMP:    Recent Labs     06/19/19  1132      K 3.9      CO2 23   BUN 11   CREATININE 0.7*   GLUCOSE 100*       ASSESSMENT AND PLAN:  37 y.o. male status post recurrent incisional hernia repair:  Pain: add oral pain medicine  Continue with diet as tolerated, saline lock IV    Dispo: 1-2 days pending control of pain      Electronically signed by SURYA Ernst - CNP     Surgery Staff    I have examined this patient and read and agree with the note by Calderon Kan CNP from today.     [facility] [employer] [facility]

## 2019-06-21 NOTE — PROGRESS NOTES
Assessment as charted. A&O x 4. VSS. Lap incisions x 4 with glue, RADHA. Midline transverse incision with foam dressing C/D/I. Abdominal binder on.  BS hypoactive x 4.  + flatus, no BM thus far. Ambulating often and independently, tolerating well. IS at bedside. Lungs CTA in all lobes. Respirations are symmetrical and non-labored. PRN Dilaudid administered as order for surgical and chronic abdominal pain. Will monitor.

## 2019-06-21 NOTE — PLAN OF CARE
Problem: Falls - Risk of: Intervention: Manage a safe environment  Note:   Room free from clutter with adequate lighting. Non-skid footwear on. Gait is steady. Bed in low position, wheels locked, SR x 2, call light and bedside table within reach.

## 2019-06-21 NOTE — PROGRESS NOTES
Pt out of room. Upon return, pt stated he went to the vending machine. Discussed with pt that he is to remain on the unit for his safety. Pt agrees to stay on unit. Will monitor.

## 2019-06-21 NOTE — PROGRESS NOTES
Pt assessment completed and charted. VSS. Pt a/o. Pt c/o 10/10 pain. PRN and scheduled medication administered per MAR. Bed in lowest position and wheels locked. Call light within reach. Bedside table within reach. Non-skid footwear in place. Pt denies any other needs at this time. Pt calls out appropriately. Will continue to monitor.

## 2019-06-22 PROCEDURE — 6360000002 HC RX W HCPCS: Performed by: SURGERY

## 2019-06-22 PROCEDURE — 6370000000 HC RX 637 (ALT 250 FOR IP): Performed by: SURGERY

## 2019-06-22 PROCEDURE — 2580000003 HC RX 258: Performed by: SURGERY

## 2019-06-22 PROCEDURE — 99024 POSTOP FOLLOW-UP VISIT: CPT | Performed by: SURGERY

## 2019-06-22 PROCEDURE — APPSS30 APP SPLIT SHARED TIME 16-30 MINUTES: Performed by: CLINICAL NURSE SPECIALIST

## 2019-06-22 PROCEDURE — 6370000000 HC RX 637 (ALT 250 FOR IP): Performed by: CLINICAL NURSE SPECIALIST

## 2019-06-22 PROCEDURE — 1200000000 HC SEMI PRIVATE

## 2019-06-22 RX ADMIN — MORPHINE SULFATE 30 MG: 30 TABLET, FILM COATED, EXTENDED RELEASE ORAL at 08:33

## 2019-06-22 RX ADMIN — HYDROMORPHONE HYDROCHLORIDE 2 MG: 2 INJECTION, SOLUTION INTRAMUSCULAR; INTRAVENOUS; SUBCUTANEOUS at 08:33

## 2019-06-22 RX ADMIN — HYDROMORPHONE HYDROCHLORIDE 2 MG: 2 INJECTION, SOLUTION INTRAMUSCULAR; INTRAVENOUS; SUBCUTANEOUS at 10:53

## 2019-06-22 RX ADMIN — KETOROLAC TROMETHAMINE 30 MG: 30 INJECTION, SOLUTION INTRAMUSCULAR at 08:33

## 2019-06-22 RX ADMIN — ENOXAPARIN SODIUM 40 MG: 40 INJECTION SUBCUTANEOUS at 08:33

## 2019-06-22 RX ADMIN — FAMOTIDINE 20 MG: 20 TABLET ORAL at 20:08

## 2019-06-22 RX ADMIN — KETOROLAC TROMETHAMINE 30 MG: 30 INJECTION, SOLUTION INTRAMUSCULAR at 20:08

## 2019-06-22 RX ADMIN — FAMOTIDINE 20 MG: 20 TABLET ORAL at 08:34

## 2019-06-22 RX ADMIN — Medication 10 ML: at 08:34

## 2019-06-22 RX ADMIN — OXYCODONE HYDROCHLORIDE AND ACETAMINOPHEN 2 TABLET: 5; 325 TABLET ORAL at 13:15

## 2019-06-22 RX ADMIN — OXYCODONE HYDROCHLORIDE AND ACETAMINOPHEN 2 TABLET: 5; 325 TABLET ORAL at 00:01

## 2019-06-22 RX ADMIN — HYDROMORPHONE HYDROCHLORIDE 2 MG: 2 INJECTION, SOLUTION INTRAMUSCULAR; INTRAVENOUS; SUBCUTANEOUS at 21:35

## 2019-06-22 RX ADMIN — MORPHINE SULFATE 30 MG: 30 TABLET, FILM COATED, EXTENDED RELEASE ORAL at 20:08

## 2019-06-22 RX ADMIN — HYDROMORPHONE HYDROCHLORIDE 2 MG: 2 INJECTION, SOLUTION INTRAMUSCULAR; INTRAVENOUS; SUBCUTANEOUS at 18:19

## 2019-06-22 RX ADMIN — KETOROLAC TROMETHAMINE 30 MG: 30 INJECTION, SOLUTION INTRAMUSCULAR at 13:14

## 2019-06-22 RX ADMIN — Medication 10 ML: at 20:09

## 2019-06-22 RX ADMIN — HYDROMORPHONE HYDROCHLORIDE 2 MG: 2 INJECTION, SOLUTION INTRAMUSCULAR; INTRAVENOUS; SUBCUTANEOUS at 14:49

## 2019-06-22 RX ADMIN — OXYCODONE HYDROCHLORIDE AND ACETAMINOPHEN 2 TABLET: 5; 325 TABLET ORAL at 06:10

## 2019-06-22 RX ADMIN — KETOROLAC TROMETHAMINE 30 MG: 30 INJECTION, SOLUTION INTRAMUSCULAR at 02:56

## 2019-06-22 RX ADMIN — HYDROMORPHONE HYDROCHLORIDE 2 MG: 2 INJECTION, SOLUTION INTRAMUSCULAR; INTRAVENOUS; SUBCUTANEOUS at 02:57

## 2019-06-22 RX ADMIN — OXYCODONE HYDROCHLORIDE AND ACETAMINOPHEN 2 TABLET: 5; 325 TABLET ORAL at 23:53

## 2019-06-22 ASSESSMENT — PAIN SCALES - GENERAL
PAINLEVEL_OUTOF10: 8
PAINLEVEL_OUTOF10: 7
PAINLEVEL_OUTOF10: 9
PAINLEVEL_OUTOF10: 8
PAINLEVEL_OUTOF10: 7
PAINLEVEL_OUTOF10: 7

## 2019-06-22 ASSESSMENT — PAIN DESCRIPTION - LOCATION: LOCATION: ABDOMEN

## 2019-06-22 ASSESSMENT — PAIN DESCRIPTION - PAIN TYPE: TYPE: CHRONIC PAIN;SURGICAL PAIN

## 2019-06-22 NOTE — OP NOTE
defect (approximately 8 x 10 cm) with  associated 2 x 2 cm separate medial defect. 2.  Incarcerated ileocolic anastomosis within the hernia, released  robotically. 3.  Open excision of the hernia sac with fascial defect closure. 4.  Robotic placement of a 15 x 20 cm Ventriolight mesh. DESCRIPTION OF PROCEDURE:  After informed consent was obtained, the  patient was taken to the operating room, placed in the supine position. Preoperative antibiotics had been initiated in the holding area. Antithrombotic pumps were placed on the legs. General anesthesia was  administered without difficulty. The abdomen was prepped and draped in  a sterile fashion. We placed a bump under the left side of the abdomen  and then proceeded to prep the abdomen in the sterile fashion. Using  the previous trocar incision in the left upper quadrant, we reopened it  and inserted a trocar under direct vision into the peritoneal cavity. Insufflation was initiated. We then placed a total of two more trocars,  a 12 mm and an 8 mm trocar in the left mid and lower abdomen. The  initial trocar was switched out for another trocar slightly more lateral  to help us avoid the adhesions that were located in the left upper  quadrant. There were omental adhesions across the upper abdominal area. Of note, however, I did not see any signs anywhere throughout the  abdomen of any carcinomatosis as had been present before. We continued  clearing off the adhesions working towards the large defect that could  be seen on the right side of the abdomen. There was clearly bowel loops  extending up into the defect, although most of them fell freely back  into the abdominal cavity. However, I could see his transverse colon  extending up across the edge of the defect into the hernia sac  suggesting that the ileocolic anastomosis was located up in the hernia.    We gradually and carefully began reducing the rest of the omental  adhesions and eventually releasing the transverse colon from where it  was attached to the abdominal wall, working up towards the edge of the  fascial defect. We then grasped the ileocolic anastomosis within the  defect and continued releasing the adhesion, holding it in the hernia. After well over 30 minutes of dissection, we eventually had everything  mobilized out of the hernia defect back into the abdominal cavity. There were no bowel injuries noted. There was a separate smaller hernia  defect noted just medial to the larger defect as well with no  incarceration. Initially, I thought I would try to close the large defect from within  with robotic approach and I started running a barbed suture on the  lateral edge of the defect with the goal of closing it back to the  medial edge. However, the defect was fairly wide and I did not feel I  would be able to close it under the tension of the pneumoperitoneum. I  therefore elected to stop at this point with the robotic approach and  switch to an open approach. We undocked the robot. I then opened his  prior transverse incision and dissected down until I encountered the  large hernia sac. We then bluntly and sharply dissected around the  entire diameter of the hernia sac down to the fascial edges. We then  sharply excised the entire hernia sac and removed it. I was now able to  use a #1 Prolene suture and close the defect entirely. I now went back to the robot to plan for our mesh placement to reinforce  the hernia closure. The suture line measured approximately 13 cm in  length. I therefore opened a 15 x 20 cm Ventriolight mesh with a  balloon support system. This mesh was inserted into the peritoneal  cavity and then the balloon support system inflated. The mesh was  grasped by the assistant through the abdominal wall on the mid point of  the incision and brought up against the abdominal wall where it sat  nicely in a transverse orientation.   I now used running barbed

## 2019-06-22 NOTE — PROGRESS NOTES
Assessment as charted. A&O x 4. VSS. Medicated per orders for surgical and chronic abdominal pain 8/10, see eMAR. Ambulates independently.

## 2019-06-22 NOTE — PLAN OF CARE
Problem: Falls - Risk of:  Goal: Will remain free from falls  Description  Will remain free from falls  Outcome: Ongoing  Goal: Absence of physical injury  Description  Absence of physical injury  Outcome: Ongoing     Pt remains free from accidental injury or fall. Pt ambulates self in room without difficulty. Will monitor.

## 2019-06-22 NOTE — PROGRESS NOTES
Pt. Resting in bed. Alert/oriented. Vitals and assessment stable as charted. Pain 8\10; administered 2 mg dilaudid per pt request as well as other scheduled pain medications. Tolerating general diet fine. + flatus as well as BM. Encouraged I\S use. Call light in reach. Will continue to monitor.

## 2019-06-23 VITALS
SYSTOLIC BLOOD PRESSURE: 120 MMHG | RESPIRATION RATE: 14 BRPM | TEMPERATURE: 98.2 F | WEIGHT: 209.88 LBS | OXYGEN SATURATION: 94 % | HEART RATE: 66 BPM | BODY MASS INDEX: 30.05 KG/M2 | DIASTOLIC BLOOD PRESSURE: 74 MMHG | HEIGHT: 70 IN

## 2019-06-23 PROCEDURE — 6370000000 HC RX 637 (ALT 250 FOR IP): Performed by: SURGERY

## 2019-06-23 PROCEDURE — 2580000003 HC RX 258: Performed by: SURGERY

## 2019-06-23 PROCEDURE — APPNB30 APP NON BILLABLE TIME 0-30 MINS: Performed by: CLINICAL NURSE SPECIALIST

## 2019-06-23 PROCEDURE — APPSS30 APP SPLIT SHARED TIME 16-30 MINUTES: Performed by: CLINICAL NURSE SPECIALIST

## 2019-06-23 PROCEDURE — 6370000000 HC RX 637 (ALT 250 FOR IP): Performed by: CLINICAL NURSE SPECIALIST

## 2019-06-23 PROCEDURE — 6360000002 HC RX W HCPCS: Performed by: SURGERY

## 2019-06-23 RX ADMIN — HYDROMORPHONE HYDROCHLORIDE 2 MG: 2 INJECTION, SOLUTION INTRAMUSCULAR; INTRAVENOUS; SUBCUTANEOUS at 01:13

## 2019-06-23 RX ADMIN — MORPHINE SULFATE 30 MG: 30 TABLET, FILM COATED, EXTENDED RELEASE ORAL at 08:11

## 2019-06-23 RX ADMIN — HYDROMORPHONE HYDROCHLORIDE 2 MG: 2 INJECTION, SOLUTION INTRAMUSCULAR; INTRAVENOUS; SUBCUTANEOUS at 03:54

## 2019-06-23 RX ADMIN — Medication 10 ML: at 08:12

## 2019-06-23 RX ADMIN — OXYCODONE HYDROCHLORIDE AND ACETAMINOPHEN 2 TABLET: 5; 325 TABLET ORAL at 06:14

## 2019-06-23 RX ADMIN — FAMOTIDINE 20 MG: 20 TABLET ORAL at 08:11

## 2019-06-23 RX ADMIN — ENOXAPARIN SODIUM 40 MG: 40 INJECTION SUBCUTANEOUS at 08:11

## 2019-06-23 RX ADMIN — KETOROLAC TROMETHAMINE 30 MG: 30 INJECTION, SOLUTION INTRAMUSCULAR at 01:17

## 2019-06-23 ASSESSMENT — PAIN SCALES - GENERAL
PAINLEVEL_OUTOF10: 8
PAINLEVEL_OUTOF10: 7
PAINLEVEL_OUTOF10: 8
PAINLEVEL_OUTOF10: 6

## 2019-06-23 NOTE — DISCHARGE SUMMARY
Surgery Discharge Summary    Patient Identification  Apryl Barfield is a 37 y.o. male. :  1980  Admit Date:  2019    Discharge date: 2019                                    Disposition: home    Discharge Diagnoses:   Principal Problem:    Recurrent incisional hernia  Resolved Problems:    * No resolved hospital problems. *      Discharge condition: good    Discharge Medications:     Current Discharge Medication List      CONTINUE these medications which have NOT CHANGED    Details   oxyCODONE HCl (OXY-IR) 10 MG immediate release tablet 1 po q 4 h prn pain. Qty: 120 tablet, Refills: 0      morphine (MS CONTIN) 30 MG extended release tablet Take 1 tablet by mouth 2 times daily . Qty: 60 tablet, Refills: 0                Current Discharge Medication List            Most Recent Labs:    CBC: No results for input(s): WBC, HGB, HCT, PLT in the last 72 hours. BMP:  No results for input(s): NA, K, CL, CO2, BUN, CREATININE, GLUCOSE in the last 72 hours. Hepatic: No results for input(s): AST, ALT, ALB, BILITOT, ALKPHOS in the last 72 hours. PT/INR:  No results for input(s): INR in the last 72 hours. Consults: none    Surgery: incisional hernia repair    Patient Instructions: Activity: no heavy lifting, pushing, pulling for 6 weeks, no driving for 2 weeks or while on analgesics  Diet: As tolerated  Follow-up with Dr. Jess Branch in 2 weeks. The patient and/or family/patient representatives, were provided education regarding discharge instructions, ongoing treatment and follow-up. Details of information given to the patient may be found in the discharge instructions located in the EMR. HPI and Hospital Course: The pt presented to the hospital for elective repair of incisional hernia. He had an uneventful recovery, and was discharged to home in good condition once his pain was adequately controlled. For a detailed hospital course, please refer to the daily progress notes.        Mandeep Newman [address]

## 2019-06-23 NOTE — PLAN OF CARE
Problem: Pain:  Goal: Pain level will decrease  Description  Pain level will decrease  Outcome: Ongoing  Note:   Pt c/o abdominal pain 8/10, prn IV pain medication given per MAR. Treating pain with scheduled and prn medications per MAR. Will continue to monitor.

## 2019-06-23 NOTE — PROGRESS NOTES
Pt was instructed to notify nurse when his transportation arrived. I have checked his room twice within the hour and he is absent. Will discharge out of system now.

## 2019-06-23 NOTE — PROGRESS NOTES
Discharge: Pt discharged to home as per order. Port de-accessed. Instructions given. Pt verbalized understanding. Denied questions. Waiting for ride to pick him up.

## 2019-06-23 NOTE — PROGRESS NOTES
Interval History: Pt with no new c/o; no problems overnight.    Review of Systems   Constitutional: Negative for fever.   Respiratory: Negative for shortness of breath.    Cardiovascular: Negative for chest pain.     Objective:     Vital Signs (Most Recent):  Temp: 98.8 °F (37.1 °C) (04/14/18 0752)  Pulse: 84 (04/14/18 0752)  Resp: 18 (04/14/18 0752)  BP: 135/64 (04/14/18 0752)  SpO2: (!) 82 % (04/14/18 0752) Vital Signs (24h Range):  Temp:  [97.5 °F (36.4 °C)-98.8 °F (37.1 °C)] 98.8 °F (37.1 °C)  Pulse:  [75-84] 84  Resp:  [18] 18  SpO2:  [82 %-96 %] 82 %  BP: (122-142)/(60-66) 135/64     Weight: 62.3 kg (137 lb 5.6 oz)  Body mass index is 21.53 kg/m².    Intake/Output Summary (Last 24 hours) at 04/14/18 1014  Last data filed at 04/14/18 0700   Gross per 24 hour   Intake                0 ml   Output              750 ml   Net             -750 ml      Physical Exam   Constitutional: He appears well-developed and well-nourished. No distress.   HENT:   Head: Normocephalic and atraumatic.   Eyes: EOM are normal. Pupils are equal, round, and reactive to light.   Neck: Normal range of motion. Neck supple.   Cardiovascular: Normal rate and regular rhythm.    Pulmonary/Chest: Effort normal and breath sounds normal.   Abdominal: Soft. Bowel sounds are normal.   Musculoskeletal: Normal range of motion.   Neurological: He is alert.   Oriented x 2, and cooperative   Skin: Skin is warm and dry.   Psychiatric: His affect is blunt. His speech is delayed. Cognition and memory are impaired.       Significant Labs: None   [facility] GENERAL SURGERY    Surgery Progress Note           POD # 4    PATIENT NAME: Darrick Love DATE: 6/23/2019    INTERVAL HISTORY:    Feeling well and wants to go home. OBJECTIVE:   VITALS:  /74   Pulse 66   Temp 98.2 °F (36.8 °C) (Oral)   Resp 14   Ht 5' 10\" (1.778 m)   Wt 209 lb 14.1 oz (95.2 kg) Comment: removed SCD pump before weighing  SpO2 94%   BMI 30.11 kg/m²     INTAKE/OUTPUT:    I/O last 3 completed shifts: In: 1200 [P.O.:1200]  Out: -   No intake/output data recorded. CONSTITUTIONAL:  awake and alert  ABDOMEN:soft, non-distended, tenderness noted as appropriate   INCISION: clean, dry, no drainage      ASSESSMENT AND PLAN:  37 y.o. male status post recurrent incisional hernia repair:  Doing well - pain controlled. Discharge to home today.      Electronically signed by SURYA Lenz - BATSHEVA

## 2019-06-23 NOTE — PROGRESS NOTES
Pt. Resting in bed. Alert/oriented. Vitals and assessment stable as charted. Tolerating general diet still. Still + flatus\BM's. Pain well controlled with current prn pain medications. Wants to go home today. Call light in reach. Will continue to monitor.

## 2019-07-10 ENCOUNTER — TELEPHONE (OUTPATIENT)
Dept: SURGERY | Age: 39
End: 2019-07-10

## 2019-07-10 NOTE — TELEPHONE ENCOUNTER
Pts girlfriend called - states pt is in penitentiary and needs non-narcotic pain medication - I informed her that she is not on pts HIPAA and I could not give her any information about pt - she stated she would fax a Power of [address]

## 2019-07-11 ENCOUNTER — TELEPHONE (OUTPATIENT)
Dept: SURGERY | Age: 39
End: 2019-07-11

## 2019-07-11 NOTE — TELEPHONE ENCOUNTER
Called [employer] ([facility]) - no answer - LM on VM to CB about pts pain medication situation while in senior living

## 2019-07-12 ENCOUNTER — TELEPHONE (OUTPATIENT)
Dept: SURGERY | Age: 39
End: 2019-07-12

## 2020-05-22 ENCOUNTER — APPOINTMENT (OUTPATIENT)
Dept: CT IMAGING | Age: 40
DRG: 694 | End: 2020-05-22
Payer: MEDICAID

## 2020-05-22 ENCOUNTER — HOSPITAL ENCOUNTER (INPATIENT)
Age: 40
LOS: 3 days | Discharge: HOME | DRG: 694 | End: 2020-05-25
Attending: EMERGENCY MEDICINE | Admitting: INTERNAL MEDICINE
Payer: MEDICAID

## 2020-05-22 LAB
A/G RATIO: 1.2 (ref 1.1–2.2)
ALBUMIN SERPL-MCNC: 4.9 G/DL (ref 3.4–5)
ALP BLD-CCNC: 90 U/L (ref 40–129)
ALT SERPL-CCNC: <5 U/L (ref 10–40)
ANION GAP SERPL CALCULATED.3IONS-SCNC: 12 MMOL/L (ref 3–16)
AST SERPL-CCNC: 16 U/L (ref 15–37)
BASOPHILS ABSOLUTE: 0.1 K/UL (ref 0–0.2)
BASOPHILS RELATIVE PERCENT: 0.9 %
BILIRUB SERPL-MCNC: 0.5 MG/DL (ref 0–1)
BILIRUBIN URINE: NEGATIVE
BLOOD, URINE: NEGATIVE
BUN BLDV-MCNC: 6 MG/DL (ref 7–20)
CALCIUM SERPL-MCNC: 9.9 MG/DL (ref 8.3–10.6)
CHLORIDE BLD-SCNC: 100 MMOL/L (ref 99–110)
CLARITY: CLEAR
CO2: 27 MMOL/L (ref 21–32)
COLOR: NORMAL
CREAT SERPL-MCNC: 0.8 MG/DL (ref 0.9–1.3)
EOSINOPHILS ABSOLUTE: 0.1 K/UL (ref 0–0.6)
EOSINOPHILS RELATIVE PERCENT: 1.1 %
GFR AFRICAN AMERICAN: >60
GFR NON-AFRICAN AMERICAN: >60
GLOBULIN: 4.2 G/DL
GLUCOSE BLD-MCNC: 122 MG/DL (ref 70–99)
GLUCOSE URINE: NEGATIVE MG/DL
HCT VFR BLD CALC: 38.4 % (ref 40.5–52.5)
HEMOGLOBIN: 13 G/DL (ref 13.5–17.5)
KETONES, URINE: NEGATIVE MG/DL
LEUKOCYTE ESTERASE, URINE: NEGATIVE
LIPASE: 41 U/L (ref 13–60)
LYMPHOCYTES ABSOLUTE: 2.1 K/UL (ref 1–5.1)
LYMPHOCYTES RELATIVE PERCENT: 21.5 %
MCH RBC QN AUTO: 26.9 PG (ref 26–34)
MCHC RBC AUTO-ENTMCNC: 33.8 G/DL (ref 31–36)
MCV RBC AUTO: 79.7 FL (ref 80–100)
MICROSCOPIC EXAMINATION: NORMAL
MONOCYTES ABSOLUTE: 0.5 K/UL (ref 0–1.3)
MONOCYTES RELATIVE PERCENT: 5.6 %
NEUTROPHILS ABSOLUTE: 6.9 K/UL (ref 1.7–7.7)
NEUTROPHILS RELATIVE PERCENT: 70.9 %
NITRITE, URINE: NEGATIVE
PDW BLD-RTO: 15.5 % (ref 12.4–15.4)
PH UA: 7 (ref 5–8)
PLATELET # BLD: 456 K/UL (ref 135–450)
PMV BLD AUTO: 7 FL (ref 5–10.5)
POTASSIUM SERPL-SCNC: 3.9 MMOL/L (ref 3.5–5.1)
PROTEIN UA: NEGATIVE MG/DL
RBC # BLD: 4.82 M/UL (ref 4.2–5.9)
SODIUM BLD-SCNC: 139 MMOL/L (ref 136–145)
SPECIFIC GRAVITY UA: <=1.005 (ref 1–1.03)
TOTAL PROTEIN: 9.1 G/DL (ref 6.4–8.2)
URINE REFLEX TO CULTURE: NORMAL
URINE TYPE: NORMAL
UROBILINOGEN, URINE: 0.2 E.U./DL
WBC # BLD: 9.7 K/UL (ref 4–11)

## 2020-05-22 PROCEDURE — 80053 COMPREHEN METABOLIC PANEL: CPT

## 2020-05-22 PROCEDURE — 6360000004 HC RX CONTRAST MEDICATION: Performed by: NURSE PRACTITIONER

## 2020-05-22 PROCEDURE — 99285 EMERGENCY DEPT VISIT HI MDM: CPT

## 2020-05-22 PROCEDURE — 2580000003 HC RX 258: Performed by: NURSE PRACTITIONER

## 2020-05-22 PROCEDURE — 81003 URINALYSIS AUTO W/O SCOPE: CPT

## 2020-05-22 PROCEDURE — 96374 THER/PROPH/DIAG INJ IV PUSH: CPT

## 2020-05-22 PROCEDURE — 2060000000 HC ICU INTERMEDIATE R&B

## 2020-05-22 PROCEDURE — 74177 CT ABD & PELVIS W/CONTRAST: CPT

## 2020-05-22 PROCEDURE — 6360000002 HC RX W HCPCS: Performed by: EMERGENCY MEDICINE

## 2020-05-22 PROCEDURE — 83690 ASSAY OF LIPASE: CPT

## 2020-05-22 PROCEDURE — 85025 COMPLETE CBC W/AUTO DIFF WBC: CPT

## 2020-05-22 PROCEDURE — 96375 TX/PRO/DX INJ NEW DRUG ADDON: CPT

## 2020-05-22 PROCEDURE — 6360000002 HC RX W HCPCS: Performed by: NURSE PRACTITIONER

## 2020-05-22 RX ORDER — ONDANSETRON 2 MG/ML
4 INJECTION INTRAMUSCULAR; INTRAVENOUS ONCE
Status: COMPLETED | OUTPATIENT
Start: 2020-05-22 | End: 2020-05-22

## 2020-05-22 RX ORDER — KETOROLAC TROMETHAMINE 30 MG/ML
30 INJECTION, SOLUTION INTRAMUSCULAR; INTRAVENOUS ONCE
Status: COMPLETED | OUTPATIENT
Start: 2020-05-22 | End: 2020-05-22

## 2020-05-22 RX ORDER — ONDANSETRON 2 MG/ML
4 INJECTION INTRAMUSCULAR; INTRAVENOUS EVERY 6 HOURS PRN
Status: DISCONTINUED | OUTPATIENT
Start: 2020-05-22 | End: 2020-05-25 | Stop reason: HOSPADM

## 2020-05-22 RX ORDER — ACETAMINOPHEN 325 MG/1
650 TABLET ORAL EVERY 6 HOURS PRN
Status: DISCONTINUED | OUTPATIENT
Start: 2020-05-22 | End: 2020-05-25 | Stop reason: HOSPADM

## 2020-05-22 RX ORDER — METHADONE HYDROCHLORIDE 10 MG/1
10 TABLET ORAL EVERY 4 HOURS PRN
Status: ON HOLD | COMMUNITY
End: 2020-05-24 | Stop reason: HOSPADM

## 2020-05-22 RX ORDER — SODIUM CHLORIDE 0.9 % (FLUSH) 0.9 %
10 SYRINGE (ML) INJECTION PRN
Status: DISCONTINUED | OUTPATIENT
Start: 2020-05-22 | End: 2020-05-25 | Stop reason: HOSPADM

## 2020-05-22 RX ORDER — SODIUM CHLORIDE 0.9 % (FLUSH) 0.9 %
10 SYRINGE (ML) INJECTION EVERY 12 HOURS SCHEDULED
Status: DISCONTINUED | OUTPATIENT
Start: 2020-05-22 | End: 2020-05-25 | Stop reason: HOSPADM

## 2020-05-22 RX ORDER — MORPHINE SULFATE 4 MG/ML
4 INJECTION, SOLUTION INTRAMUSCULAR; INTRAVENOUS ONCE
Status: COMPLETED | OUTPATIENT
Start: 2020-05-22 | End: 2020-05-22

## 2020-05-22 RX ORDER — PROMETHAZINE HYDROCHLORIDE 25 MG/1
12.5 TABLET ORAL EVERY 6 HOURS PRN
Status: DISCONTINUED | OUTPATIENT
Start: 2020-05-22 | End: 2020-05-25 | Stop reason: HOSPADM

## 2020-05-22 RX ORDER — SODIUM CHLORIDE 9 MG/ML
INJECTION, SOLUTION INTRAVENOUS CONTINUOUS
Status: DISCONTINUED | OUTPATIENT
Start: 2020-05-22 | End: 2020-05-25 | Stop reason: HOSPADM

## 2020-05-22 RX ORDER — HYDROMORPHONE HCL 110MG/55ML
1 PATIENT CONTROLLED ANALGESIA SYRINGE INTRAVENOUS
Status: DISCONTINUED | OUTPATIENT
Start: 2020-05-22 | End: 2020-05-23

## 2020-05-22 RX ORDER — HYDROMORPHONE HCL 110MG/55ML
0.5 PATIENT CONTROLLED ANALGESIA SYRINGE INTRAVENOUS
Status: DISCONTINUED | OUTPATIENT
Start: 2020-05-22 | End: 2020-05-23

## 2020-05-22 RX ORDER — MORPHINE SULFATE 2 MG/ML
2 INJECTION, SOLUTION INTRAMUSCULAR; INTRAVENOUS ONCE
Status: COMPLETED | OUTPATIENT
Start: 2020-05-22 | End: 2020-05-22

## 2020-05-22 RX ORDER — 0.9 % SODIUM CHLORIDE 0.9 %
1000 INTRAVENOUS SOLUTION INTRAVENOUS ONCE
Status: COMPLETED | OUTPATIENT
Start: 2020-05-22 | End: 2020-05-22

## 2020-05-22 RX ADMIN — MORPHINE SULFATE 2 MG: 2 INJECTION, SOLUTION INTRAMUSCULAR; INTRAVENOUS at 22:06

## 2020-05-22 RX ADMIN — IOPAMIDOL 75 ML: 755 INJECTION, SOLUTION INTRAVENOUS at 20:20

## 2020-05-22 RX ADMIN — SODIUM CHLORIDE 1000 ML: 9 INJECTION, SOLUTION INTRAVENOUS at 18:38

## 2020-05-22 RX ADMIN — ONDANSETRON 4 MG: 2 INJECTION INTRAMUSCULAR; INTRAVENOUS at 18:39

## 2020-05-22 RX ADMIN — MORPHINE SULFATE 4 MG: 4 INJECTION, SOLUTION INTRAMUSCULAR; INTRAVENOUS at 18:39

## 2020-05-22 RX ADMIN — SODIUM CHLORIDE: 9 INJECTION, SOLUTION INTRAVENOUS at 23:30

## 2020-05-22 RX ADMIN — Medication 10 ML: at 23:42

## 2020-05-22 RX ADMIN — HYDROMORPHONE HYDROCHLORIDE 1 MG: 1 INJECTION, SOLUTION INTRAMUSCULAR; INTRAVENOUS; SUBCUTANEOUS at 21:22

## 2020-05-22 RX ADMIN — KETOROLAC TROMETHAMINE 30 MG: 30 INJECTION, SOLUTION INTRAMUSCULAR at 22:06

## 2020-05-22 ASSESSMENT — PAIN DESCRIPTION - LOCATION
LOCATION: ABDOMEN
LOCATION: ABDOMEN

## 2020-05-22 ASSESSMENT — PAIN DESCRIPTION - DESCRIPTORS: DESCRIPTORS: CONSTANT

## 2020-05-22 ASSESSMENT — PAIN SCALES - GENERAL
PAINLEVEL_OUTOF10: 8
PAINLEVEL_OUTOF10: 8
PAINLEVEL_OUTOF10: 6

## 2020-05-22 ASSESSMENT — PAIN DESCRIPTION - PAIN TYPE
TYPE: ACUTE PAIN
TYPE: ACUTE PAIN

## 2020-05-22 ASSESSMENT — PAIN DESCRIPTION - ORIENTATION
ORIENTATION: RIGHT
ORIENTATION: RIGHT

## 2020-05-22 ASSESSMENT — PAIN DESCRIPTION - DIRECTION: RADIATING_TOWARDS: R FLANK

## 2020-05-23 LAB
A/G RATIO: 1.2 (ref 1.1–2.2)
ALBUMIN SERPL-MCNC: 4 G/DL (ref 3.4–5)
ALP BLD-CCNC: 70 U/L (ref 40–129)
ALT SERPL-CCNC: 9 U/L (ref 10–40)
ANION GAP SERPL CALCULATED.3IONS-SCNC: 8 MMOL/L (ref 3–16)
AST SERPL-CCNC: 12 U/L (ref 15–37)
BASOPHILS ABSOLUTE: 0.1 K/UL (ref 0–0.2)
BASOPHILS RELATIVE PERCENT: 0.9 %
BILIRUB SERPL-MCNC: 0.3 MG/DL (ref 0–1)
BUN BLDV-MCNC: 7 MG/DL (ref 7–20)
CALCIUM SERPL-MCNC: 8.7 MG/DL (ref 8.3–10.6)
CHLORIDE BLD-SCNC: 103 MMOL/L (ref 99–110)
CO2: 29 MMOL/L (ref 21–32)
CREAT SERPL-MCNC: 0.9 MG/DL (ref 0.9–1.3)
EOSINOPHILS ABSOLUTE: 0.2 K/UL (ref 0–0.6)
EOSINOPHILS RELATIVE PERCENT: 3.8 %
GFR AFRICAN AMERICAN: >60
GFR NON-AFRICAN AMERICAN: >60
GLOBULIN: 3.4 G/DL
GLUCOSE BLD-MCNC: 79 MG/DL (ref 70–99)
HCT VFR BLD CALC: 33 % (ref 40.5–52.5)
HEMOGLOBIN: 10.9 G/DL (ref 13.5–17.5)
LYMPHOCYTES ABSOLUTE: 2.6 K/UL (ref 1–5.1)
LYMPHOCYTES RELATIVE PERCENT: 39.5 %
MAGNESIUM: 2.5 MG/DL (ref 1.8–2.4)
MCH RBC QN AUTO: 26.7 PG (ref 26–34)
MCHC RBC AUTO-ENTMCNC: 33.1 G/DL (ref 31–36)
MCV RBC AUTO: 80.5 FL (ref 80–100)
MONOCYTES ABSOLUTE: 0.6 K/UL (ref 0–1.3)
MONOCYTES RELATIVE PERCENT: 8.7 %
NEUTROPHILS ABSOLUTE: 3.1 K/UL (ref 1.7–7.7)
NEUTROPHILS RELATIVE PERCENT: 47.1 %
PDW BLD-RTO: 15.5 % (ref 12.4–15.4)
PLATELET # BLD: 354 K/UL (ref 135–450)
PMV BLD AUTO: 7.3 FL (ref 5–10.5)
POTASSIUM REFLEX MAGNESIUM: 3.3 MMOL/L (ref 3.5–5.1)
RBC # BLD: 4.09 M/UL (ref 4.2–5.9)
SODIUM BLD-SCNC: 140 MMOL/L (ref 136–145)
TOTAL PROTEIN: 7.4 G/DL (ref 6.4–8.2)
WBC # BLD: 6.6 K/UL (ref 4–11)

## 2020-05-23 PROCEDURE — 2060000000 HC ICU INTERMEDIATE R&B

## 2020-05-23 PROCEDURE — 80053 COMPREHEN METABOLIC PANEL: CPT

## 2020-05-23 PROCEDURE — 85025 COMPLETE CBC W/AUTO DIFF WBC: CPT

## 2020-05-23 PROCEDURE — 6370000000 HC RX 637 (ALT 250 FOR IP): Performed by: INTERNAL MEDICINE

## 2020-05-23 PROCEDURE — 36591 DRAW BLOOD OFF VENOUS DEVICE: CPT

## 2020-05-23 PROCEDURE — 83735 ASSAY OF MAGNESIUM: CPT

## 2020-05-23 PROCEDURE — 2580000003 HC RX 258: Performed by: NURSE PRACTITIONER

## 2020-05-23 PROCEDURE — 6360000002 HC RX W HCPCS: Performed by: INTERNAL MEDICINE

## 2020-05-23 PROCEDURE — 6360000002 HC RX W HCPCS: Performed by: NURSE PRACTITIONER

## 2020-05-23 RX ORDER — OXYCODONE HYDROCHLORIDE 15 MG/1
15 TABLET ORAL EVERY 4 HOURS PRN
Status: DISCONTINUED | OUTPATIENT
Start: 2020-05-23 | End: 2020-05-25 | Stop reason: HOSPADM

## 2020-05-23 RX ORDER — NICOTINE 21 MG/24HR
1 PATCH, TRANSDERMAL 24 HOURS TRANSDERMAL DAILY
Status: DISCONTINUED | OUTPATIENT
Start: 2020-05-23 | End: 2020-05-25 | Stop reason: HOSPADM

## 2020-05-23 RX ORDER — HYDROMORPHONE HCL 110MG/55ML
2 PATIENT CONTROLLED ANALGESIA SYRINGE INTRAVENOUS
Status: DISCONTINUED | OUTPATIENT
Start: 2020-05-23 | End: 2020-05-25 | Stop reason: HOSPADM

## 2020-05-23 RX ORDER — HYDROMORPHONE HCL 110MG/55ML
1 PATIENT CONTROLLED ANALGESIA SYRINGE INTRAVENOUS
Status: DISCONTINUED | OUTPATIENT
Start: 2020-05-23 | End: 2020-05-25 | Stop reason: HOSPADM

## 2020-05-23 RX ORDER — METHADONE HYDROCHLORIDE 10 MG/1
10 TABLET ORAL EVERY 8 HOURS SCHEDULED
Status: DISCONTINUED | OUTPATIENT
Start: 2020-05-23 | End: 2020-05-25 | Stop reason: HOSPADM

## 2020-05-23 RX ORDER — KETOROLAC TROMETHAMINE 30 MG/ML
30 INJECTION, SOLUTION INTRAMUSCULAR; INTRAVENOUS EVERY 6 HOURS PRN
Status: DISCONTINUED | OUTPATIENT
Start: 2020-05-23 | End: 2020-05-25 | Stop reason: HOSPADM

## 2020-05-23 RX ADMIN — METHADONE HYDROCHLORIDE 10 MG: 10 TABLET ORAL at 14:36

## 2020-05-23 RX ADMIN — SODIUM CHLORIDE: 9 INJECTION, SOLUTION INTRAVENOUS at 09:15

## 2020-05-23 RX ADMIN — HYDROMORPHONE HYDROCHLORIDE 2 MG: 2 INJECTION, SOLUTION INTRAMUSCULAR; INTRAVENOUS; SUBCUTANEOUS at 13:02

## 2020-05-23 RX ADMIN — ENOXAPARIN SODIUM 40 MG: 40 INJECTION SUBCUTANEOUS at 09:09

## 2020-05-23 RX ADMIN — METHADONE HYDROCHLORIDE 10 MG: 10 TABLET ORAL at 21:33

## 2020-05-23 RX ADMIN — KETOROLAC TROMETHAMINE 30 MG: 30 INJECTION, SOLUTION INTRAMUSCULAR at 16:49

## 2020-05-23 RX ADMIN — SODIUM CHLORIDE: 9 INJECTION, SOLUTION INTRAVENOUS at 21:33

## 2020-05-23 RX ADMIN — HYDROMORPHONE HYDROCHLORIDE 1 MG: 2 INJECTION, SOLUTION INTRAMUSCULAR; INTRAVENOUS; SUBCUTANEOUS at 00:40

## 2020-05-23 RX ADMIN — Medication 10 ML: at 21:33

## 2020-05-23 RX ADMIN — ONDANSETRON 4 MG: 2 INJECTION INTRAMUSCULAR; INTRAVENOUS at 00:40

## 2020-05-23 RX ADMIN — HYDROMORPHONE HYDROCHLORIDE 2 MG: 2 INJECTION, SOLUTION INTRAMUSCULAR; INTRAVENOUS; SUBCUTANEOUS at 16:46

## 2020-05-23 RX ADMIN — OXYCODONE HYDROCHLORIDE 20 MG: 15 TABLET ORAL at 22:51

## 2020-05-23 RX ADMIN — HYDROMORPHONE HYDROCHLORIDE 1 MG: 2 INJECTION, SOLUTION INTRAMUSCULAR; INTRAVENOUS; SUBCUTANEOUS at 09:08

## 2020-05-23 RX ADMIN — HYDROMORPHONE HYDROCHLORIDE 1 MG: 2 INJECTION, SOLUTION INTRAMUSCULAR; INTRAVENOUS; SUBCUTANEOUS at 04:49

## 2020-05-23 ASSESSMENT — PAIN SCALES - GENERAL
PAINLEVEL_OUTOF10: 7
PAINLEVEL_OUTOF10: 8
PAINLEVEL_OUTOF10: 6
PAINLEVEL_OUTOF10: 8
PAINLEVEL_OUTOF10: 7
PAINLEVEL_OUTOF10: 7

## 2020-05-23 ASSESSMENT — PAIN DESCRIPTION - LOCATION: LOCATION: ABDOMEN

## 2020-05-23 ASSESSMENT — PAIN DESCRIPTION - PAIN TYPE: TYPE: ACUTE PAIN

## 2020-05-23 NOTE — ED PROVIDER NOTES
not helping it and therefore I did give a small dose of morphine on top of that along with Toradol. He was alert and oriented when I spoke to him and answering questions appropriately. He was stable for the floor with telemetry. No chest pain or shortness of breath and story not suggestive of acute coronary syndrome or pulmonary embolism.      Cass Koenig MD  05/22/20 7229
Bilirubin Urine Negative Negative    Ketones, Urine Negative Negative mg/dL    Specific Gravity, UA <=1.005 1.005 - 1.030    Blood, Urine Negative Negative    pH, UA 7.0 5.0 - 8.0    Protein, UA Negative Negative mg/dL    Urobilinogen, Urine 0.2 <2.0 E.U./dL    Nitrite, Urine Negative Negative    Leukocyte Esterase, Urine Negative Negative    Microscopic Examination Not Indicated     Urine Type NotGiven     Urine Reflex to Culture Not Indicated        Final Impression    1. Metastatic disease ([address]    2. Nausea        Blood pressure 112/88, pulse 98, temperature 98.6 °F (37 °C), resp. rate 20, weight 209 lb (94.8 kg), SpO2 98 %. DISPOSITION  Patient was admitted to the hospital.       DISCLAIMER:  Please note this report has been produced using speech recognition Dragon software and may contain errors related to that system including errors in grammar, punctuation, and spelling, as well as words and phrases that may be inappropriate. If there are any questions or concerns please feel free to contact the dictating provider for clarification.                 Pau Horta, SURYA - [facility]  05/22/20 7623

## 2020-05-23 NOTE — PROGRESS NOTES
Pt admitted to a216 in stable condition. VSS. Admission and assessment complete and charted. Accessed port and pt is unsure if it is a power port. No skin issues. Pt has ankle monitor on L ankle as he is out on bond d/t COVID. Pt states he follows with Dr. Yareli Torres but has been incarcerated for 10 months. Pt states he gets [place] every 3 weeks and is due to get it next week. Pt verbalized understanding of clear liquid diet. Bed locked and in lowest position. Will continue to monitor.

## 2020-05-23 NOTE — H&P
Hospital Medicine History & Physical      PCP: No primary care provider on file. Date of Admission: 5/22/2020    Date of Service: Pt seen/examined on 5/22/2020 and Admitted to Inpatient with expected LOS greater than two midnights due to medical therapy. Chief Complaint:    Chief Complaint   Patient presents with    Abdominal Pain     R SIDED ABD PAIN, AND NAUSEA FOR 4-5 DAYS, BOWEL MOVEMENT TODAY, HX OBSTRUCTIONS     Nausea     History Of Present Illness:      41 y.o. male, with PMH of HTN, HLD, colon CA, hypothyroidism, and obesity, who presented to [facility] with right-sided abdominal pain and nausea. History was obtained from the patient review of the EMR. The patient states that he has history of colon cancer and has had several abdominal surgeries involving bowel obstructions. He states that his oncologist is Dr. Yareli Torres but he has not seen him in quite some time due to being in snf for the past 10 months. He states that for the past 10 days or so, he has been having right lower abdominal pain and severe nausea. He states that he had a routine CT scan of his abdomen for surveillance performed at [facility] on 5/18/2020 that revealed bowel obstruction and he states that the [place] center was told to bring him back to the hospital for which he stayed overnight for observation. He was then released from snf on 5/19/2020. The patient states that he was diagnosed with colon cancer about 5 years ago and has had several rounds of chemo, several abdominal surgeries, and has consistently been on Keytruda for the past few years. He states that his right lower quadrant abdominal pain is intermittent and sharp at times. He states that the pain waxes and wanes but never completely goes away. He is on chronic opioids for this pain and has been taking them as prescribed though states that they have not really been helping much at all at home. Denies any alcohol use for the past year.     The patient

## 2020-05-23 NOTE — PROGRESS NOTES
PS Dr. Richi Griffin- \"Routine consult ordered by Yadira Elizabeth NP for hx colon ca, extensive mets noted on CT. Pt just released from [facility] after 10 months and gets [facility] q3 weeks and is due next week. \"

## 2020-05-23 NOTE — CONSULTS
hernia repair and bowel resection       Current Medications:    Current Facility-Administered Medications   Medication Dose Route Frequency Provider Last Rate Last Dose    nicotine (NICODERM CQ) 14 MG/24HR 1 patch  1 patch Transdermal Daily Loretta Mink, APRN - NP   Stopped at 05/23/20 0449    sodium chloride flush 0.9 % injection 10 mL  10 mL Intravenous 2 times per day Loretta Mink, APRN - NP   10 mL at 05/22/20 2342    sodium chloride flush 0.9 % injection 10 mL  10 mL Intravenous PRN Loretta Mink, APRN - NP        acetaminophen (TYLENOL) tablet 650 mg  650 mg Oral Q6H PRN Loretta Mink, APRN - NP        promethazine (PHENERGAN) tablet 12.5 mg  12.5 mg Oral Q6H PRN Loretta Mink, APRN - NP        Or    ondansetron (ZOFRAN) injection 4 mg  4 mg Intravenous Q6H PRN Loretta Mink, APRN - NP   4 mg at 05/23/20 0040    enoxaparin (LOVENOX) injection 40 mg  40 mg Subcutaneous Daily Loretta Mink, APRN - NP   40 mg at 05/23/20 0909    0.9 % sodium chloride infusion   Intravenous Continuous Loretta Mink, APRN -  mL/hr at 05/23/20 0915      HYDROmorphone (DILAUDID) injection 0.5 mg  0.5 mg Intravenous Q3H PRN Loretta Mink, APRN - NP        Or    HYDROmorphone (DILAUDID) injection 1 mg  1 mg Intravenous Q3H PRN Loretta Mink, APRN - NP   1 mg at 05/23/20 0908     Allergies:  No Known Allergies    Social History:   Social History     Socioeconomic History    Marital status:      Spouse name: Not on file    Number of children: 2    Years of education: Not on file    Highest education level: Not on file   Occupational History    Not on file   Social Needs    Financial resource strain: Not on file    Food insecurity     Worry: Not on file     Inability: Not on file   [employer] needs     Medical: Not on file     Non-medical: Not on file   Tobacco Use    Smoking status: Current Every Day Smoker     Packs/day: 0.50     Years: 10.00     Pack years: 5.00     Types: Cigarettes    Acute respiratory failure (HCC)    Pneumonia    Abdominal pain    Colitis    Ileus (HCC)    [facility] dependence [facility]    Alcohol withdrawal ([facility]    Tobacco abuse    Postoperative wound seroma    Encounter for long-term (current) use of high-risk medication    Cancer of right colon ([facility]    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[facility]    Encounter for smoking cessation counseling    Incisional hernia without obstruction or gangrene    Postoperative intra-abdominal abscess    Abdominal abscess    Abscess of abdominal wall    Sepsis (HCC)    Postoperative abscess    VRE (vancomycin-resistant Enterococci) infection    Delirium due to another medical condition    Adjustment disorder with anxious mood    Epigastric pain    Acute pancreatitis    Leukocytosis    Suprapubic pain    Right lower quadrant abdominal pain    Pain management    Chronic alcoholic pancreatitis (HCC)    Bowel obstruction (HCC)    Recurrent incisional hernia    Obesity    Hypertension    Hyperlipidemia    GERD (gastroesophageal reflux disease)    Thyroid disease       IMPRESSION/RECOMMENDATIONS:  1.) Colon cancer  - He is progressing on immunotherapy. - He has exhausted all standard of care treatments. - Hospice vs a phase 1 trial might be an option, but that is an outpatient treatment. 2.) Cancer-associated pain  - Was on Methadone 10 mg every 8 hours and Oxycodone 10 mg PRN. - Will resume the home regimen. 3.) Emesis prior to admission  - No emesis since admission.  - Advance diet. .    Thank you for asking me to see the patient.        Ping Shelton MD  PleaseContact Through [employer]

## 2020-05-24 LAB
ALBUMIN SERPL-MCNC: 3.6 G/DL (ref 3.4–5)
ANION GAP SERPL CALCULATED.3IONS-SCNC: 7 MMOL/L (ref 3–16)
BUN BLDV-MCNC: 7 MG/DL (ref 7–20)
CALCIUM SERPL-MCNC: 8.4 MG/DL (ref 8.3–10.6)
CHLORIDE BLD-SCNC: 106 MMOL/L (ref 99–110)
CO2: 27 MMOL/L (ref 21–32)
CREAT SERPL-MCNC: 0.6 MG/DL (ref 0.9–1.3)
GFR AFRICAN AMERICAN: >60
GFR NON-AFRICAN AMERICAN: >60
GLUCOSE BLD-MCNC: 103 MG/DL (ref 70–99)
HCT VFR BLD CALC: 31.4 % (ref 40.5–52.5)
HEMOGLOBIN: 10.1 G/DL (ref 13.5–17.5)
MAGNESIUM: 2.2 MG/DL (ref 1.8–2.4)
MCH RBC QN AUTO: 26.1 PG (ref 26–34)
MCHC RBC AUTO-ENTMCNC: 32.2 G/DL (ref 31–36)
MCV RBC AUTO: 81.2 FL (ref 80–100)
PDW BLD-RTO: 15.7 % (ref 12.4–15.4)
PHOSPHORUS: 3 MG/DL (ref 2.5–4.9)
PLATELET # BLD: 296 K/UL (ref 135–450)
PMV BLD AUTO: 7 FL (ref 5–10.5)
POTASSIUM SERPL-SCNC: 3.7 MMOL/L (ref 3.5–5.1)
RBC # BLD: 3.86 M/UL (ref 4.2–5.9)
SODIUM BLD-SCNC: 140 MMOL/L (ref 136–145)
WBC # BLD: 6.7 K/UL (ref 4–11)

## 2020-05-24 PROCEDURE — 2060000000 HC ICU INTERMEDIATE R&B

## 2020-05-24 PROCEDURE — 6370000000 HC RX 637 (ALT 250 FOR IP): Performed by: INTERNAL MEDICINE

## 2020-05-24 PROCEDURE — 6360000002 HC RX W HCPCS: Performed by: NURSE PRACTITIONER

## 2020-05-24 PROCEDURE — 6360000002 HC RX W HCPCS: Performed by: INTERNAL MEDICINE

## 2020-05-24 PROCEDURE — 80069 RENAL FUNCTION PANEL: CPT

## 2020-05-24 PROCEDURE — 85027 COMPLETE CBC AUTOMATED: CPT

## 2020-05-24 PROCEDURE — 83735 ASSAY OF MAGNESIUM: CPT

## 2020-05-24 PROCEDURE — 36591 DRAW BLOOD OFF VENOUS DEVICE: CPT

## 2020-05-24 PROCEDURE — 2580000003 HC RX 258: Performed by: NURSE PRACTITIONER

## 2020-05-24 RX ORDER — OXYCODONE HYDROCHLORIDE 15 MG/1
15 TABLET ORAL EVERY 6 HOURS PRN
Qty: 28 TABLET | Refills: 0 | Status: SHIPPED | OUTPATIENT
Start: 2020-05-24 | End: 2020-05-25

## 2020-05-24 RX ORDER — METHADONE HYDROCHLORIDE 10 MG/1
10 TABLET ORAL 3 TIMES DAILY
Qty: 21 TABLET | Refills: 0 | Status: SHIPPED | OUTPATIENT
Start: 2020-05-24 | End: 2020-05-25

## 2020-05-24 RX ADMIN — OXYCODONE HYDROCHLORIDE 20 MG: 15 TABLET ORAL at 23:10

## 2020-05-24 RX ADMIN — Medication 10 ML: at 20:07

## 2020-05-24 RX ADMIN — METHADONE HYDROCHLORIDE 10 MG: 10 TABLET ORAL at 14:34

## 2020-05-24 RX ADMIN — HYDROMORPHONE HYDROCHLORIDE 2 MG: 2 INJECTION, SOLUTION INTRAMUSCULAR; INTRAVENOUS; SUBCUTANEOUS at 13:15

## 2020-05-24 RX ADMIN — SODIUM CHLORIDE: 9 INJECTION, SOLUTION INTRAVENOUS at 20:06

## 2020-05-24 RX ADMIN — OXYCODONE HYDROCHLORIDE 20 MG: 15 TABLET ORAL at 09:45

## 2020-05-24 RX ADMIN — OXYCODONE HYDROCHLORIDE 20 MG: 15 TABLET ORAL at 16:21

## 2020-05-24 RX ADMIN — OXYCODONE HYDROCHLORIDE 20 MG: 15 TABLET ORAL at 04:17

## 2020-05-24 RX ADMIN — KETOROLAC TROMETHAMINE 30 MG: 30 INJECTION, SOLUTION INTRAMUSCULAR at 13:15

## 2020-05-24 RX ADMIN — ENOXAPARIN SODIUM 40 MG: 40 INJECTION SUBCUTANEOUS at 08:48

## 2020-05-24 RX ADMIN — HYDROMORPHONE HYDROCHLORIDE 2 MG: 2 INJECTION, SOLUTION INTRAMUSCULAR; INTRAVENOUS; SUBCUTANEOUS at 00:33

## 2020-05-24 RX ADMIN — Medication 10 ML: at 05:15

## 2020-05-24 RX ADMIN — METHADONE HYDROCHLORIDE 10 MG: 10 TABLET ORAL at 21:33

## 2020-05-24 RX ADMIN — HYDROMORPHONE HYDROCHLORIDE 2 MG: 2 INJECTION, SOLUTION INTRAMUSCULAR; INTRAVENOUS; SUBCUTANEOUS at 20:06

## 2020-05-24 RX ADMIN — SODIUM CHLORIDE: 9 INJECTION, SOLUTION INTRAVENOUS at 13:19

## 2020-05-24 RX ADMIN — METHADONE HYDROCHLORIDE 10 MG: 10 TABLET ORAL at 05:16

## 2020-05-24 RX ADMIN — HYDROMORPHONE HYDROCHLORIDE 2 MG: 2 INJECTION, SOLUTION INTRAMUSCULAR; INTRAVENOUS; SUBCUTANEOUS at 08:47

## 2020-05-24 RX ADMIN — KETOROLAC TROMETHAMINE 30 MG: 30 INJECTION, SOLUTION INTRAMUSCULAR at 01:26

## 2020-05-24 ASSESSMENT — PAIN SCALES - GENERAL
PAINLEVEL_OUTOF10: 8
PAINLEVEL_OUTOF10: 7
PAINLEVEL_OUTOF10: 6
PAINLEVEL_OUTOF10: 9
PAINLEVEL_OUTOF10: 7
PAINLEVEL_OUTOF10: 8
PAINLEVEL_OUTOF10: 7
PAINLEVEL_OUTOF10: 7
PAINLEVEL_OUTOF10: 8
PAINLEVEL_OUTOF10: 7

## 2020-05-24 NOTE — PROGRESS NOTES
Patient alert and oriented. Discussed pain management and fact that he is still requiring iv pain medication. Patient is going to try to stop taking iv pain medication so he can better assess his level of pain. Talked with patient about hospice for pain management. Seemed open to meeting with them to discuss philosophy.

## 2020-05-24 NOTE — ONCOLOGY
ONCOLOGY HEMATOLOGY CARE PROGRESS NOTE      SUBJECTIVE:     Afebrile and on room air. He is on a general diet but has not eaten breakfast yet. ROS:   The remaining 10 point review of symptoms is unremarkable. OBJECTIVE        Physical    VITALS:  /64   Pulse 66   Temp 97.8 °F (36.6 °C) (Oral)   Resp 18   Ht 5' 10\" (1.778 m)   Wt 208 lb 4.8 oz (94.5 kg)   SpO2 99%   BMI 29.89 kg/m²   TEMPERATURE:  Current - Temp: 97.8 °F (36.6 °C); Max - Temp  Av.3 °F (36.8 °C)  Min: 97.8 °F (36.6 °C)  Max: 98.9 °F (37.2 °C)  PULSE OXIMETRY RANGE: SpO2  Av %  Min: 97 %  Max: 99 %  24HR INTAKE/OUTPUT:      Intake/Output Summary (Last 24 hours) at 2020 3487  Last data filed at 2020 1161  Gross per 24 hour   Intake 3942 ml   Output --   Net 3942 ml       CONSTITUTIONAL:  awake, alert, cooperative, no apparent distress, HEENT oral pharynx , no scleral icterus  HEMATOLOGIC/LYMPHATICS:  no cervical lymphadenopathy, no supraclavicular lymphadenopathy, no axillary lymphadenopathy and no inguinal lymphadenopathy  LUNGS:  No increased work of breathing, good air exchange, clear to auscultation bilaterally, no crackles or wheezing  CARDIOVASCULAR:  , regular rate and rhythm, normal S1 and S2, no S3 or S4, and no murmur noted  ABDOMEN:  No scars, normal bowel sounds, soft, non-distended, non-tender, no masses palpated, no hepatosplenomegally  MUSCULOSKELETAL:  There is no redness, warmth, or swelling of the joints. EXTREMETIES: No clubbing cynosis or edema  NEUROLOGIC:  Awake, alert, oriented to name, place and time. Cranial nerves II-XII are grossly intact. Motor is 5 out of 5 bilaterally.    SKIN:  no bruising or bleeding      Data      Recent Labs     20  1830 20  0445 20  0515   WBC 9.7 6.6 6.7   HGB 13.0* 10.9* 10.1*   HCT 38.4* 33.0* 31.4*   * 354 296   MCV 79.7* 80.5 81.2        Recent Labs     20  1830 20  0445 20  0515  140 140   K 3.9 3.3* 3.7    103 106   CO2 27 29 27   PHOS  --   --  3.0   BUN 6* 7 7   CREATININE 0.8* 0.9 0.6*     Recent Labs     05/22/20  1830 05/23/20  0445   AST 16 12*   ALT <5* 9*   BILITOT 0.5 0.3   ALKPHOS 90 70       Magnesium:    Lab Results   Component Value Date    MG 2.20 05/24/2020    MG 2.50 05/23/2020    MG 2.00 05/13/2019         Problem List  Patient Active Problem List   Diagnosis    BANDEMIA    LFT elevation    Smoker    Alcohol consumption heavy    Bilateral pneumonia    Acute respiratory failure (HCC)    Pneumonia    Abdominal pain    Colitis    Ileus ([address]    Alcohol dependence ([address]    Alcohol withdrawal ([address]    Tobacco abuse    Postoperative wound seroma    Encounter for long-term (current) use of high-risk medication    Cancer of right colon ([address]    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[address]    Encounter for smoking cessation counseling    Incisional hernia without obstruction or gangrene    Postoperative intra-abdominal abscess    Abdominal abscess    Abscess of abdominal wall    Sepsis ([address]    Postoperative abscess    VRE (vancomycin-resistant Enterococci) infection    Delirium due to another medical condition    Adjustment disorder with anxious mood    Epigastric pain    Acute pancreatitis    Leukocytosis    Suprapubic pain    Right lower quadrant abdominal pain    Pain management    Chronic alcoholic pancreatitis (HCC)    Bowel obstruction (HCC)    Recurrent incisional hernia    Obesity    Hypertension    Hyperlipidemia    GERD (gastroesophageal reflux disease)    Thyroid disease       ASSESSMENT AND PLAN    1.) Colon cancer  - He is progressing on 6th-line immunotherapy. - He has exhausted all standard of care treatments. - Hospice vs a phase 1 trial might be an option, but that is an outpatient activity.     2.) Cancer-associated pain  - Methadone 10 mg every 8 hours and Oxycodone 15-20 mg PRN.   - Will resume the home

## 2020-05-24 NOTE — PLAN OF CARE
Pt with continuous R abdominal pain that is stabbing in nature. Pt voices improvement with pain meds but no change in pain control since admission. Pt clam and resting in bed currently. Call light within reach, will continue to monitor.

## 2020-05-25 VITALS
SYSTOLIC BLOOD PRESSURE: 101 MMHG | BODY MASS INDEX: 29.82 KG/M2 | TEMPERATURE: 98.7 F | DIASTOLIC BLOOD PRESSURE: 64 MMHG | HEART RATE: 64 BPM | WEIGHT: 208.3 LBS | RESPIRATION RATE: 18 BRPM | HEIGHT: 70 IN | OXYGEN SATURATION: 95 %

## 2020-05-25 PROCEDURE — 6360000002 HC RX W HCPCS: Performed by: INTERNAL MEDICINE

## 2020-05-25 PROCEDURE — 6370000000 HC RX 637 (ALT 250 FOR IP): Performed by: INTERNAL MEDICINE

## 2020-05-25 RX ORDER — OXYCODONE HYDROCHLORIDE 15 MG/1
15 TABLET ORAL EVERY 6 HOURS PRN
Qty: 28 TABLET | Refills: 0 | Status: SHIPPED | OUTPATIENT
Start: 2020-05-25 | End: 2020-05-25 | Stop reason: HOSPADM

## 2020-05-25 RX ORDER — METHADONE HYDROCHLORIDE 10 MG/1
10 TABLET ORAL 3 TIMES DAILY
Qty: 21 TABLET | Refills: 0 | Status: SHIPPED | OUTPATIENT
Start: 2020-05-25 | End: 2020-05-25 | Stop reason: HOSPADM

## 2020-05-25 RX ORDER — NALOXONE HYDROCHLORIDE 4 MG/.1ML
1 SPRAY NASAL PRN
Qty: 1 EACH | Refills: 5 | Status: ON HOLD | OUTPATIENT
Start: 2020-05-25 | End: 2020-09-28

## 2020-05-25 RX ADMIN — METHADONE HYDROCHLORIDE 10 MG: 10 TABLET ORAL at 05:01

## 2020-05-25 RX ADMIN — KETOROLAC TROMETHAMINE 30 MG: 30 INJECTION, SOLUTION INTRAMUSCULAR at 15:53

## 2020-05-25 RX ADMIN — KETOROLAC TROMETHAMINE 30 MG: 30 INJECTION, SOLUTION INTRAMUSCULAR at 07:33

## 2020-05-25 RX ADMIN — METHADONE HYDROCHLORIDE 10 MG: 10 TABLET ORAL at 14:20

## 2020-05-25 RX ADMIN — OXYCODONE HYDROCHLORIDE 20 MG: 15 TABLET ORAL at 12:50

## 2020-05-25 RX ADMIN — HYDROMORPHONE HYDROCHLORIDE 2 MG: 2 INJECTION, SOLUTION INTRAMUSCULAR; INTRAVENOUS; SUBCUTANEOUS at 07:33

## 2020-05-25 RX ADMIN — KETOROLAC TROMETHAMINE 30 MG: 30 INJECTION, SOLUTION INTRAMUSCULAR at 01:04

## 2020-05-25 RX ADMIN — HYDROMORPHONE HYDROCHLORIDE 2 MG: 2 INJECTION, SOLUTION INTRAMUSCULAR; INTRAVENOUS; SUBCUTANEOUS at 01:04

## 2020-05-25 RX ADMIN — HYDROMORPHONE HYDROCHLORIDE 2 MG: 2 INJECTION, SOLUTION INTRAMUSCULAR; INTRAVENOUS; SUBCUTANEOUS at 11:01

## 2020-05-25 RX ADMIN — HYDROMORPHONE HYDROCHLORIDE 2 MG: 2 INJECTION, SOLUTION INTRAMUSCULAR; INTRAVENOUS; SUBCUTANEOUS at 15:54

## 2020-05-25 RX ADMIN — OXYCODONE HYDROCHLORIDE 20 MG: 15 TABLET ORAL at 03:28

## 2020-05-25 ASSESSMENT — PAIN SCALES - GENERAL
PAINLEVEL_OUTOF10: 7
PAINLEVEL_OUTOF10: 8
PAINLEVEL_OUTOF10: 7
PAINLEVEL_OUTOF10: 7
PAINLEVEL_OUTOF10: 8
PAINLEVEL_OUTOF10: 9

## 2020-05-25 ASSESSMENT — PAIN DESCRIPTION - FREQUENCY: FREQUENCY: CONTINUOUS

## 2020-05-25 ASSESSMENT — PAIN DESCRIPTION - ORIENTATION: ORIENTATION: RIGHT

## 2020-05-25 ASSESSMENT — PAIN DESCRIPTION - PAIN TYPE: TYPE: ACUTE PAIN

## 2020-05-25 ASSESSMENT — PAIN DESCRIPTION - LOCATION: LOCATION: ABDOMEN

## 2020-05-25 NOTE — PROGRESS NOTES
Hospitalist Progress Note      PCP: No primary care provider on file. Date of Admission: 5/22/2020    Chief Complaint: Abdominal Pain w/ nausea    Subjective: no new c/o. Medications:  Reviewed    Infusion Medications    sodium chloride 100 mL/hr at 05/24/20 2006     Scheduled Medications    nicotine  1 patch Transdermal Daily    methadone  10 mg Oral 3 times per day    sodium chloride flush  10 mL Intravenous 2 times per day    enoxaparin  40 mg Subcutaneous Daily     PRN Meds: oxyCODONE, oxyCODONE, HYDROmorphone **OR** HYDROmorphone, ketorolac, sodium chloride flush, acetaminophen, promethazine **OR** ondansetron      Intake/Output Summary (Last 24 hours) at 5/25/2020 1004  Last data filed at 5/25/2020 0501  Gross per 24 hour   Intake 4379 ml   Output --   Net 4379 ml       Physical Exam Performed:    /71   Pulse 73   Temp 97.8 °F (36.6 °C) (Oral)   Resp 20   Ht 5' 10\" (1.778 m)   Wt 208 lb 4.8 oz (94.5 kg)   SpO2 99%   BMI 29.89 kg/m²     General appearance: No apparent distress, appears stated age and cooperative. HEENT: Pupils equal, round, and reactive to light. Conjunctivae/corneas clear. Neck: Supple, with full range of motion. No jugular venous distention. Trachea midline. Respiratory:  Normal respiratory effort. Clear to auscultation, bilaterally without Rales/Wheezes/Rhonchi. Cardiovascular: Regular rate and rhythm with normal S1/S2 without murmurs, rubs or gallops. Abdomen: Soft, non-tender, non-distended with normal bowel sounds. Musculoskeletal: No clubbing, cyanosis or edema bilaterally. Full range of motion without deformity. Skin: Skin color, texture, turgor normal.  No rashes or lesions. Neurologic:  Neurovascularly intact without any focal sensory/motor deficits.  Cranial nerves: II-XII intact, grossly non-focal.  Psychiatric: Alert and oriented, thought content appropriate, normal insight  Capillary Refill: Brisk,< 3 seconds   Peripheral Pulses: +2 none

## 2020-05-25 NOTE — PROGRESS NOTES
Assessment complete and charted. Pt states that he has mild improvement in his pain to a 5-7 versus a 7-9 yesterday. Pt has concerns on pain control going home and would like to discuss this and POC for future oncology tx with AM MD. Palliative care consulted. Call light within reach, will continue to monitor.

## 2020-05-26 ENCOUNTER — CARE COORDINATION (OUTPATIENT)
Dept: CASE MANAGEMENT | Age: 40
End: 2020-05-26

## 2020-05-26 NOTE — CARE COORDINATION
COVID-19 Monitoring Risk Call: Attempted to reach patient via phone for initial post hospital transition call. VM left stating purpose of call along with my contact information requesting a return call.     Mariely Cardozo BSN, RN, [facility]  Care Transition Nurse   [phone]

## 2020-05-27 ENCOUNTER — CARE COORDINATION (OUTPATIENT)
Dept: CASE MANAGEMENT | Age: 40
End: 2020-05-27

## 2020-09-27 RX ORDER — METHADONE HYDROCHLORIDE 10 MG/1
10 TABLET ORAL EVERY 8 HOURS PRN
COMMUNITY
Start: 2020-09-15 | End: 2020-10-15

## 2020-09-27 RX ORDER — OXYCODONE HYDROCHLORIDE 20 MG/1
20 TABLET ORAL EVERY 4 HOURS PRN
COMMUNITY
Start: 2020-09-15 | End: 2020-10-15

## 2020-09-27 ASSESSMENT — PAIN DESCRIPTION - LOCATION: LOCATION: ABDOMEN

## 2020-09-27 ASSESSMENT — PAIN DESCRIPTION - DESCRIPTORS: DESCRIPTORS: CONSTANT

## 2020-09-27 ASSESSMENT — PAIN SCALES - GENERAL: PAINLEVEL_OUTOF10: 7

## 2020-09-27 ASSESSMENT — PAIN DESCRIPTION - FREQUENCY: FREQUENCY: CONTINUOUS

## 2020-09-27 ASSESSMENT — PAIN DESCRIPTION - PAIN TYPE: TYPE: ACUTE PAIN

## 2020-09-27 ASSESSMENT — PAIN DESCRIPTION - PROGRESSION: CLINICAL_PROGRESSION: GRADUALLY WORSENING

## 2020-09-27 ASSESSMENT — PAIN DESCRIPTION - ONSET: ONSET: ON-GOING

## 2020-09-28 ENCOUNTER — APPOINTMENT (OUTPATIENT)
Dept: CT IMAGING | Age: 40
DRG: 872 | End: 2020-09-28

## 2020-09-28 ENCOUNTER — APPOINTMENT (OUTPATIENT)
Dept: GENERAL RADIOLOGY | Age: 40
DRG: 872 | End: 2020-09-28

## 2020-09-28 ENCOUNTER — HOSPITAL ENCOUNTER (INPATIENT)
Age: 40
LOS: 1 days | Discharge: HOSPICE | DRG: 872 | End: 2020-10-01
Attending: EMERGENCY MEDICINE | Admitting: INTERNAL MEDICINE
Payer: MEDICAID

## 2020-09-28 LAB
A/G RATIO: 0.9 (ref 1.1–2.2)
ALBUMIN SERPL-MCNC: 3.9 G/DL (ref 3.4–5)
ALP BLD-CCNC: 89 U/L (ref 40–129)
ALT SERPL-CCNC: 12 U/L (ref 10–40)
ANION GAP SERPL CALCULATED.3IONS-SCNC: 15 MMOL/L (ref 3–16)
AST SERPL-CCNC: 19 U/L (ref 15–37)
BASOPHILS ABSOLUTE: 0.1 K/UL (ref 0–0.2)
BASOPHILS RELATIVE PERCENT: 0.4 %
BILIRUB SERPL-MCNC: 0.5 MG/DL (ref 0–1)
BILIRUBIN URINE: NEGATIVE
BLOOD, URINE: NEGATIVE
BUN BLDV-MCNC: 6 MG/DL (ref 7–20)
CALCIUM SERPL-MCNC: 9.1 MG/DL (ref 8.3–10.6)
CHLORIDE BLD-SCNC: 86 MMOL/L (ref 99–110)
CLARITY: CLEAR
CO2: 30 MMOL/L (ref 21–32)
COLOR: YELLOW
CREAT SERPL-MCNC: 1 MG/DL (ref 0.9–1.3)
EOSINOPHILS ABSOLUTE: 0.2 K/UL (ref 0–0.6)
EOSINOPHILS RELATIVE PERCENT: 1.6 %
ESTIMATED AVERAGE GLUCOSE: 102.5 MG/DL
FERRITIN: 9.8 NG/ML (ref 30–400)
GFR AFRICAN AMERICAN: >60
GFR NON-AFRICAN AMERICAN: >60
GLOBULIN: 4.2 G/DL
GLUCOSE BLD-MCNC: 129 MG/DL (ref 70–99)
GLUCOSE URINE: NEGATIVE MG/DL
HBA1C MFR BLD: 5.2 %
HCT VFR BLD CALC: 24.8 % (ref 40.5–52.5)
HEMATOLOGY PATH CONSULT: NORMAL
HEMATOLOGY PATH CONSULT: YES
HEMOGLOBIN: 7.4 G/DL (ref 13.5–17.5)
INR BLD: 1.35 (ref 0.86–1.14)
IRON SATURATION: 5 % (ref 20–50)
IRON: 19 UG/DL (ref 59–158)
KETONES, URINE: ABNORMAL MG/DL
LACTIC ACID: 1.8 MMOL/L (ref 0.4–2)
LEUKOCYTE ESTERASE, URINE: NEGATIVE
LIPASE: 46 U/L (ref 13–60)
LYMPHOCYTES ABSOLUTE: 2.2 K/UL (ref 1–5.1)
LYMPHOCYTES RELATIVE PERCENT: 15.7 %
MCH RBC QN AUTO: 19.6 PG (ref 26–34)
MCHC RBC AUTO-ENTMCNC: 30 G/DL (ref 31–36)
MCV RBC AUTO: 65.2 FL (ref 80–100)
MICROSCOPIC EXAMINATION: ABNORMAL
MONOCYTES ABSOLUTE: 0.6 K/UL (ref 0–1.3)
MONOCYTES RELATIVE PERCENT: 4.1 %
NEUTROPHILS ABSOLUTE: 11.2 K/UL (ref 1.7–7.7)
NEUTROPHILS RELATIVE PERCENT: 78.2 %
NITRITE, URINE: NEGATIVE
PDW BLD-RTO: 17.8 % (ref 12.4–15.4)
PH UA: 7.5 (ref 5–8)
PLATELET # BLD: 674 K/UL (ref 135–450)
PMV BLD AUTO: 6.9 FL (ref 5–10.5)
POTASSIUM SERPL-SCNC: 3 MMOL/L (ref 3.5–5.1)
PROTEIN UA: NEGATIVE MG/DL
PROTHROMBIN TIME: 15.7 SEC (ref 10–13.2)
RBC # BLD: 3.8 M/UL (ref 4.2–5.9)
SODIUM BLD-SCNC: 131 MMOL/L (ref 136–145)
SPECIFIC GRAVITY UA: 1.01 (ref 1–1.03)
TOTAL IRON BINDING CAPACITY: 387 UG/DL (ref 260–445)
TOTAL PROTEIN: 8.1 G/DL (ref 6.4–8.2)
TROPONIN: <0.01 NG/ML
TROPONIN: <0.01 NG/ML
TSH REFLEX: 2.58 UIU/ML (ref 0.27–4.2)
URINE TYPE: ABNORMAL
UROBILINOGEN, URINE: 0.2 E.U./DL
WBC # BLD: 14.3 K/UL (ref 4–11)

## 2020-09-28 PROCEDURE — 96366 THER/PROPH/DIAG IV INF ADDON: CPT

## 2020-09-28 PROCEDURE — 97161 PT EVAL LOW COMPLEX 20 MIN: CPT

## 2020-09-28 PROCEDURE — 83036 HEMOGLOBIN GLYCOSYLATED A1C: CPT

## 2020-09-28 PROCEDURE — 2580000003 HC RX 258: Performed by: EMERGENCY MEDICINE

## 2020-09-28 PROCEDURE — 96367 TX/PROPH/DG ADDL SEQ IV INF: CPT

## 2020-09-28 PROCEDURE — 74176 CT ABD & PELVIS W/O CONTRAST: CPT

## 2020-09-28 PROCEDURE — 96365 THER/PROPH/DIAG IV INF INIT: CPT

## 2020-09-28 PROCEDURE — G0378 HOSPITAL OBSERVATION PER HR: HCPCS

## 2020-09-28 PROCEDURE — 85025 COMPLETE CBC W/AUTO DIFF WBC: CPT

## 2020-09-28 PROCEDURE — 83540 ASSAY OF IRON: CPT

## 2020-09-28 PROCEDURE — 6360000002 HC RX W HCPCS: Performed by: INTERNAL MEDICINE

## 2020-09-28 PROCEDURE — 71045 X-RAY EXAM CHEST 1 VIEW: CPT

## 2020-09-28 PROCEDURE — 84443 ASSAY THYROID STIM HORMONE: CPT

## 2020-09-28 PROCEDURE — 87040 BLOOD CULTURE FOR BACTERIA: CPT

## 2020-09-28 PROCEDURE — 96376 TX/PRO/DX INJ SAME DRUG ADON: CPT

## 2020-09-28 PROCEDURE — 6360000002 HC RX W HCPCS: Performed by: EMERGENCY MEDICINE

## 2020-09-28 PROCEDURE — 99291 CRITICAL CARE FIRST HOUR: CPT

## 2020-09-28 PROCEDURE — 97116 GAIT TRAINING THERAPY: CPT

## 2020-09-28 PROCEDURE — 36415 COLL VENOUS BLD VENIPUNCTURE: CPT

## 2020-09-28 PROCEDURE — 99254 IP/OBS CNSLTJ NEW/EST MOD 60: CPT | Performed by: SURGERY

## 2020-09-28 PROCEDURE — 83690 ASSAY OF LIPASE: CPT

## 2020-09-28 PROCEDURE — 2580000003 HC RX 258: Performed by: INTERNAL MEDICINE

## 2020-09-28 PROCEDURE — 6370000000 HC RX 637 (ALT 250 FOR IP): Performed by: INTERNAL MEDICINE

## 2020-09-28 PROCEDURE — 85610 PROTHROMBIN TIME: CPT

## 2020-09-28 PROCEDURE — 82728 ASSAY OF FERRITIN: CPT

## 2020-09-28 PROCEDURE — 81003 URINALYSIS AUTO W/O SCOPE: CPT

## 2020-09-28 PROCEDURE — 84484 ASSAY OF TROPONIN QUANT: CPT

## 2020-09-28 PROCEDURE — 83550 IRON BINDING TEST: CPT

## 2020-09-28 PROCEDURE — 80053 COMPREHEN METABOLIC PANEL: CPT

## 2020-09-28 PROCEDURE — 83605 ASSAY OF LACTIC ACID: CPT

## 2020-09-28 PROCEDURE — 96375 TX/PRO/DX INJ NEW DRUG ADDON: CPT

## 2020-09-28 PROCEDURE — 6360000004 HC RX CONTRAST MEDICATION: Performed by: EMERGENCY MEDICINE

## 2020-09-28 PROCEDURE — 2500000003 HC RX 250 WO HCPCS: Performed by: EMERGENCY MEDICINE

## 2020-09-28 RX ORDER — PROMETHAZINE HYDROCHLORIDE 25 MG/1
12.5 TABLET ORAL EVERY 6 HOURS PRN
Status: DISCONTINUED | OUTPATIENT
Start: 2020-09-28 | End: 2020-10-01 | Stop reason: HOSPADM

## 2020-09-28 RX ORDER — ACETAMINOPHEN 325 MG/1
650 TABLET ORAL EVERY 6 HOURS PRN
Status: DISCONTINUED | OUTPATIENT
Start: 2020-09-28 | End: 2020-10-01 | Stop reason: HOSPADM

## 2020-09-28 RX ORDER — MORPHINE SULFATE 4 MG/ML
4 INJECTION, SOLUTION INTRAMUSCULAR; INTRAVENOUS ONCE
Status: COMPLETED | OUTPATIENT
Start: 2020-09-28 | End: 2020-09-28

## 2020-09-28 RX ORDER — FAMOTIDINE 20 MG/1
10 TABLET, FILM COATED ORAL 2 TIMES DAILY
Status: DISCONTINUED | OUTPATIENT
Start: 2020-09-28 | End: 2020-10-01 | Stop reason: HOSPADM

## 2020-09-28 RX ORDER — SODIUM PHOSPHATE,MONO-DIBASIC 19G-7G/118
1 ENEMA (ML) RECTAL DAILY PRN
Status: DISCONTINUED | OUTPATIENT
Start: 2020-09-28 | End: 2020-10-01 | Stop reason: HOSPADM

## 2020-09-28 RX ORDER — POTASSIUM CHLORIDE 7.45 MG/ML
10 INJECTION INTRAVENOUS
Status: COMPLETED | OUTPATIENT
Start: 2020-09-28 | End: 2020-09-28

## 2020-09-28 RX ORDER — 0.9 % SODIUM CHLORIDE 0.9 %
1000 INTRAVENOUS SOLUTION INTRAVENOUS ONCE
Status: COMPLETED | OUTPATIENT
Start: 2020-09-28 | End: 2020-09-28

## 2020-09-28 RX ORDER — OXYCODONE HYDROCHLORIDE 15 MG/1
30 TABLET ORAL EVERY 4 HOURS PRN
Status: COMPLETED | OUTPATIENT
Start: 2020-09-28 | End: 2020-09-28

## 2020-09-28 RX ORDER — ACETAMINOPHEN 650 MG/1
650 SUPPOSITORY RECTAL EVERY 6 HOURS PRN
Status: DISCONTINUED | OUTPATIENT
Start: 2020-09-28 | End: 2020-10-01 | Stop reason: HOSPADM

## 2020-09-28 RX ORDER — LEVOFLOXACIN 5 MG/ML
750 INJECTION, SOLUTION INTRAVENOUS EVERY 24 HOURS
Status: DISCONTINUED | OUTPATIENT
Start: 2020-09-28 | End: 2020-10-01 | Stop reason: HOSPADM

## 2020-09-28 RX ORDER — MAGNESIUM SULFATE IN WATER 40 MG/ML
2 INJECTION, SOLUTION INTRAVENOUS PRN
Status: DISCONTINUED | OUTPATIENT
Start: 2020-09-28 | End: 2020-10-01 | Stop reason: HOSPADM

## 2020-09-28 RX ORDER — POTASSIUM CHLORIDE 20 MEQ/1
40 TABLET, EXTENDED RELEASE ORAL PRN
Status: DISCONTINUED | OUTPATIENT
Start: 2020-09-28 | End: 2020-10-01 | Stop reason: HOSPADM

## 2020-09-28 RX ORDER — ONDANSETRON 2 MG/ML
4 INJECTION INTRAMUSCULAR; INTRAVENOUS ONCE
Status: COMPLETED | OUTPATIENT
Start: 2020-09-28 | End: 2020-09-28

## 2020-09-28 RX ORDER — OXYCODONE HYDROCHLORIDE 5 MG/1
20 TABLET ORAL EVERY 4 HOURS PRN
Status: COMPLETED | OUTPATIENT
Start: 2020-09-28 | End: 2020-09-29

## 2020-09-28 RX ORDER — SODIUM CHLORIDE 0.9 % (FLUSH) 0.9 %
10 SYRINGE (ML) INJECTION PRN
Status: DISCONTINUED | OUTPATIENT
Start: 2020-09-28 | End: 2020-10-01 | Stop reason: HOSPADM

## 2020-09-28 RX ORDER — POTASSIUM CHLORIDE 7.45 MG/ML
10 INJECTION INTRAVENOUS PRN
Status: DISCONTINUED | OUTPATIENT
Start: 2020-09-28 | End: 2020-09-28 | Stop reason: SDUPTHER

## 2020-09-28 RX ORDER — POTASSIUM CHLORIDE 7.45 MG/ML
10 INJECTION INTRAVENOUS PRN
Status: DISCONTINUED | OUTPATIENT
Start: 2020-09-28 | End: 2020-10-01 | Stop reason: HOSPADM

## 2020-09-28 RX ORDER — ONDANSETRON 2 MG/ML
4 INJECTION INTRAMUSCULAR; INTRAVENOUS EVERY 6 HOURS PRN
Status: DISCONTINUED | OUTPATIENT
Start: 2020-09-28 | End: 2020-10-01 | Stop reason: HOSPADM

## 2020-09-28 RX ORDER — SODIUM CHLORIDE 9 MG/ML
1000 INJECTION, SOLUTION INTRAVENOUS ONCE
Status: COMPLETED | OUTPATIENT
Start: 2020-09-28 | End: 2020-09-28

## 2020-09-28 RX ORDER — SODIUM CHLORIDE 0.9 % (FLUSH) 0.9 %
10 SYRINGE (ML) INJECTION EVERY 12 HOURS SCHEDULED
Status: DISCONTINUED | OUTPATIENT
Start: 2020-09-28 | End: 2020-10-01 | Stop reason: HOSPADM

## 2020-09-28 RX ADMIN — HYDROMORPHONE HYDROCHLORIDE 0.5 MG: 1 INJECTION, SOLUTION INTRAMUSCULAR; INTRAVENOUS; SUBCUTANEOUS at 12:03

## 2020-09-28 RX ADMIN — SODIUM CHLORIDE, PRESERVATIVE FREE 10 ML: 5 INJECTION INTRAVENOUS at 08:28

## 2020-09-28 RX ADMIN — HYDROMORPHONE HYDROCHLORIDE 1 MG: 1 INJECTION, SOLUTION INTRAMUSCULAR; INTRAVENOUS; SUBCUTANEOUS at 04:53

## 2020-09-28 RX ADMIN — SODIUM CHLORIDE 1000 ML: 9 INJECTION, SOLUTION INTRAVENOUS at 00:45

## 2020-09-28 RX ADMIN — OXYCODONE HYDROCHLORIDE 30 MG: 15 TABLET ORAL at 15:10

## 2020-09-28 RX ADMIN — LEVOFLOXACIN 750 MG: 750 INJECTION, SOLUTION INTRAVENOUS at 05:30

## 2020-09-28 RX ADMIN — IOHEXOL 50 ML: 240 INJECTION, SOLUTION INTRATHECAL; INTRAVASCULAR; INTRAVENOUS; ORAL at 01:00

## 2020-09-28 RX ADMIN — OXYCODONE HYDROCHLORIDE 30 MG: 15 TABLET ORAL at 05:58

## 2020-09-28 RX ADMIN — METRONIDAZOLE 500 MG: 500 INJECTION, SOLUTION INTRAVENOUS at 05:26

## 2020-09-28 RX ADMIN — SODIUM CHLORIDE 1000 ML: 9 INJECTION, SOLUTION INTRAVENOUS at 05:59

## 2020-09-28 RX ADMIN — HYDROMORPHONE HYDROCHLORIDE 0.5 MG: 1 INJECTION, SOLUTION INTRAMUSCULAR; INTRAVENOUS; SUBCUTANEOUS at 16:10

## 2020-09-28 RX ADMIN — FAMOTIDINE 10 MG: 20 TABLET, FILM COATED ORAL at 05:58

## 2020-09-28 RX ADMIN — SODIUM CHLORIDE, PRESERVATIVE FREE 10 ML: 5 INJECTION INTRAVENOUS at 20:03

## 2020-09-28 RX ADMIN — HYDROMORPHONE HYDROCHLORIDE 1 MG: 1 INJECTION, SOLUTION INTRAMUSCULAR; INTRAVENOUS; SUBCUTANEOUS at 02:25

## 2020-09-28 RX ADMIN — OXYCODONE HYDROCHLORIDE 30 MG: 15 TABLET ORAL at 10:04

## 2020-09-28 RX ADMIN — POTASSIUM CHLORIDE 10 MEQ: 7.46 INJECTION, SOLUTION INTRAVENOUS at 03:44

## 2020-09-28 RX ADMIN — Medication 10 ML: at 08:08

## 2020-09-28 RX ADMIN — OXYCODONE 20 MG: 5 TABLET ORAL at 23:30

## 2020-09-28 RX ADMIN — FAMOTIDINE 10 MG: 20 TABLET, FILM COATED ORAL at 20:03

## 2020-09-28 RX ADMIN — ONDANSETRON 4 MG: 2 INJECTION INTRAMUSCULAR; INTRAVENOUS at 00:46

## 2020-09-28 RX ADMIN — HYDROMORPHONE HYDROCHLORIDE 1 MG: 1 INJECTION, SOLUTION INTRAMUSCULAR; INTRAVENOUS; SUBCUTANEOUS at 07:19

## 2020-09-28 RX ADMIN — OXYCODONE 20 MG: 5 TABLET ORAL at 19:22

## 2020-09-28 RX ADMIN — POTASSIUM CHLORIDE 10 MEQ: 7.46 INJECTION, SOLUTION INTRAVENOUS at 02:10

## 2020-09-28 RX ADMIN — MORPHINE SULFATE 4 MG: 4 INJECTION, SOLUTION INTRAMUSCULAR; INTRAVENOUS at 00:45

## 2020-09-28 ASSESSMENT — PAIN SCALES - GENERAL
PAINLEVEL_OUTOF10: 9
PAINLEVEL_OUTOF10: 8
PAINLEVEL_OUTOF10: 7
PAINLEVEL_OUTOF10: 10
PAINLEVEL_OUTOF10: 8
PAINLEVEL_OUTOF10: 8
PAINLEVEL_OUTOF10: 10
PAINLEVEL_OUTOF10: 7
PAINLEVEL_OUTOF10: 8
PAINLEVEL_OUTOF10: 7
PAINLEVEL_OUTOF10: 6
PAINLEVEL_OUTOF10: 10
PAINLEVEL_OUTOF10: 7
PAINLEVEL_OUTOF10: 10

## 2020-09-28 ASSESSMENT — PAIN DESCRIPTION - PAIN TYPE
TYPE: CHRONIC PAIN
TYPE: CHRONIC PAIN
TYPE: ACUTE PAIN;CHRONIC PAIN
TYPE: CHRONIC PAIN

## 2020-09-28 ASSESSMENT — ENCOUNTER SYMPTOMS
ABDOMINAL PAIN: 1
ALLERGIC/IMMUNOLOGIC NEGATIVE: 1
NAUSEA: 1
EYES NEGATIVE: 1
RESPIRATORY NEGATIVE: 1

## 2020-09-28 ASSESSMENT — PAIN DESCRIPTION - PROGRESSION: CLINICAL_PROGRESSION: GRADUALLY WORSENING

## 2020-09-28 ASSESSMENT — PAIN DESCRIPTION - DESCRIPTORS
DESCRIPTORS: ACHING
DESCRIPTORS: ACHING;SORE;CONSTANT

## 2020-09-28 ASSESSMENT — PAIN DESCRIPTION - ONSET: ONSET: ON-GOING

## 2020-09-28 ASSESSMENT — PAIN DESCRIPTION - ORIENTATION
ORIENTATION: RIGHT;LEFT;MID
ORIENTATION: OTHER (COMMENT)
ORIENTATION: LOWER

## 2020-09-28 ASSESSMENT — PAIN DESCRIPTION - LOCATION
LOCATION: ABDOMEN

## 2020-09-28 ASSESSMENT — PAIN DESCRIPTION - FREQUENCY
FREQUENCY: CONTINUOUS
FREQUENCY: CONTINUOUS

## 2020-09-28 NOTE — ED NOTES
Patient sitting up in bed, states still having pain at 7/10, states nausea is gone.  Physician aware, awaiting patient to go to CT scan     Karin Segundo RN  09/28/20 015

## 2020-09-28 NOTE — CONSULTS
Department of General Surgery Consult    PATIENT NAME: Cristine Schaumann OF BIRTH: 1980    ADMISSION DATE: 9/28/2020 12:09 AM      TODAY'S DATE: 9/28/2020    Reason for Consult:  abd pain    Chief Complaint: same    Requesting Physician:  Jay Jay Choi    HISTORY OF PRESENT ILLNESS:              The patient is a 41 y.o. male who presents with abdominal pain, nausea and emesis. Reports worsened symptoms over the past week. Has known history of stage IV colon cancer. Has been most recently treated at [facility] but reports no current chemotherapy. Reports pain in RLQ and right back as well as LUQ and left mid back. No fevers or chills. No weight loss. Some chronic constipation. On high dose narcotics at home for chronic pain. Reports no alcohol usage for 18 months.     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    abscess, ABD       Past Surgical History:        Procedure Laterality Date    COLON SURGERY  05/2015    COLONOSCOPY  2015    bx polyps    HERNIA REPAIR      HERNIA REPAIR N/A 6/19/2019    DAVINCI INCISIONAL HERNIA  REPAIR  OF RECURRENT INCISIONAL HERNIA WITH JANI BLOCK FOR PAIN CONTROL performed by Peggy Montez MD at [address] [place] [address] Right 04/25/2018    OTHER SURGICAL HISTORY  05/28/2015    lap converted to open right colectomy    OTHER SURGICAL HISTORY Left 06/2015    mediport    OTHER SURGICAL HISTORY  07/06/2016    hernia repair and bowel resection       Current Medications:   Current Facility-Administered Medications: levoFLOXacin (LEVAQUIN) 750 MG/150ML infusion 750 mg, 750 mg, Intravenous, Q24H  sodium chloride flush 0.9 % injection 10 mL, 10 mL, Intravenous, 2 times per day  sodium chloride flush 0.9 % injection 10 mL, 10 mL, Intravenous, PRN  potassium chloride (KLOR-CON M) extended release tablet 40 mEq, 40 mEq, Oral, PRN **OR** potassium bicarb-citric acid (EFFER-K) effervescent tablet 40 mEq, 40 mEq, Oral, PRN **OR** potassium chloride 10 mEq/100 mL IVPB (Peripheral Line), 10 mEq, Intravenous, PRN  magnesium sulfate 2 g in 50 mL IVPB premix, 2 g, Intravenous, PRN  acetaminophen (TYLENOL) tablet 650 mg, 650 mg, Oral, Q6H PRN **OR** acetaminophen (TYLENOL) suppository 650 mg, 650 mg, Rectal, Q6H PRN  bisacodyl (DULCOLAX) EC tablet 5 mg, 5 mg, Oral, Daily PRN  sodium phosphate (FLEET) enema 1 enema, 1 enema, Rectal, Daily PRN  promethazine (PHENERGAN) tablet 12.5 mg, 12.5 mg, Oral, Q6H PRN **OR** ondansetron (ZOFRAN) injection 4 mg, 4 mg, Intravenous, Q6H PRN  famotidine (PEPCID) tablet 10 mg, 10 mg, Oral, BID  oxyCODONE (OXY-IR) immediate release tablet 30 mg, 30 mg, Oral, Q4H PRN  oxyCODONE (ROXICODONE) immediate release tablet 20 mg, 20 mg, Oral, Q4H PRN  Prior to Admission medications    Medication Sig Start Date End Date Taking? Authorizing Provider   oxyCODONE (ROXICODONE) 20 MG immediate release tablet Take 20 mg by mouth every 4 hours as needed. 9/15/20 10/15/20 Yes Historical Provider, MD   methadone (DOLOPHINE) 10 MG tablet Take 10 mg by mouth every 8 hours as needed. 9/15/20 10/15/20 Yes Historical Provider, MD        Allergies:  Patient has no known allergies. Social History:   TOBACCO: yes  ETOH: quit    Family History:    No family history on file. REVIEW OF SYSTEMS:  CONSTITUTIONAL:  negative  HEENT:  negative  RESPIRATORY:  negative  CARDIOVASCULAR:  negative  GASTROINTESTINAL:  negative except for nausea, vomiting and abdominal pain  GENITOURINARY:  negative  HEMATOLOGIC/LYMPHATIC:  negative  NEUROLOGICAL:  Negative  * All other ROS reviewed and negative.        PHYSICAL EXAM:  VITALS:  /69   Pulse 96   Temp 98.6 °F (37 °C) (Oral)   Resp 18   Ht 5' 10\" (1.778 m)   Wt 200 lb (90.7 kg)   SpO2 94%   BMI 28.70 kg/m²   24HR INTAKE/OUTPUT:    I/O last 3 completed shifts: In: 1300 [IV Piggyback:1300]  Out: -   No intake/output data recorded. CONSTITUTIONAL:  alert, no apparent distress and normal weight  EYES:  PERRL, sclera clear  ENT:  Normocephalic,atraumatic, without obvious abnormality  NECK:  supple, symmetrical, trachea midline  LUNGS: Resp effort easy and unlabored, no crackles or wheezing  CARDIOVASCULAR:  NO JVD, regular rate   ABDOMEN:  Multiple scars, normal bowel sounds, soft, non-distended, tenderness noted in the right lower quadrant and in the left upper quadrant, voluntary guarding absent, no masses palpated and hernia absent  MUSCULOSKELETAL: No clubbing or cyanosis, 0+ pitting edema lower extremities  NEUROLOGIC:  Mental Status Exam:  Level of Alertness:   awake  PSYCHIATRIC:   person, place, time  SKIN:  no bruising or bleeding    DATA:    CBC:   Recent Labs     09/28/20  0016   WBC 14.3*   HGB 7.4*   HCT 24.8*   *     BMP:    Recent Labs     09/28/20  0016   *   K 3.0*   CL 86*   CO2 30   BUN 6*   CREATININE 1.0   GLUCOSE 129*     Hepatic:   Recent Labs     09/28/20  0016   AST 19   ALT 12   BILITOT 0.5   ALKPHOS 89     Mag:    No results for input(s): MG in the last 72 hours. Phos:   No results for input(s): PHOS in the last 72 hours. INR:   Recent Labs     09/28/20  0828   INR 1.35*       Radiology Review: Images personally reviewed by me. CT -   1. A cluster and loop of small bowel in the right upper quadrant adjacent to    the known colonic mass demonstrates tumor invasion into the small bowel wall    with at least 1 and possibly 2 enterocolonic fistulas.     2. Increased size of a cystic appearing lesion along the greater curvature of    the stomach which measures up to 5.3 cm.  This is of unclear clinical    significance but does not have an appearance similar to the tumor deposits    elsewhere.  Pancreatic pseudocysts could be considered. Suann Pillar is mild    irregularity with small stranding about the tail of the pancreas.  Correlate    with lipase levels. 3. Unchanged 6 mm right middle lobe pulmonary nodule. 4. Periaortic, retrocrural and retroperitoneal lymphadenopathy, some of which    is increased since the comparison study. 5. Small reactive mesenteric stranding. IMPRESSION/RECOMMENDATIONS:    40 yo with stage IV colon cancer and abd pain  1. 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 Reports not being on chemotherapy so follow up on Oncology recommendations on whether he needs to start. Currently no signs of obstruction or abscess. Wouldn't plan on operation at this time. 2.  Paragastric fluid collection - has history of pancreatitis in the past with significant inflammation in this same area seen on prior imaging. If this represents a pseudocyst or sequelae of gastric ulcer then percutaneous drainage would run the risk of establishing a gastrocutaneous or pancreaticocutaneous fistula. If drainage is needed then a better option would be endoscopic especially given its location. Will consult GI.     Electronically signed by Nic Lyles, [address] [place] [address]  [phone]

## 2020-09-28 NOTE — ED NOTES
Patient taken to floor on tele monitoring in wheelchair. Patient is alert and oriented. No family with patient. Personal belongings in lap. Report to floor RN.       [facility]  09/28/20 [id number]

## 2020-09-28 NOTE — PROGRESS NOTES
4 Eyes Skin Assessment     The patient is being assess for   Admission    I agree that 2 RN's have performed a thorough Head to Toe Skin Assessment on the patient. ALL assessment sites listed below have been assessed. Areas assessed by both nurses:   [x]   Head, Face, and Ears   [x]   Shoulders, Back, and Chest, Abdomen  [x]   Arms, Elbows, and Hands   [x]   Coccyx, Sacrum, and Ischium  [x]   Legs, Feet, and Heels        No skin issues    **SHARE this note so that the co-signing nurse is able to place an eSignature**    Co-signer eSignature: Electronically signed by Danielle Thornton RN on 9/28/20 at 6:28 AM EDT    Does the Patient have Skin Breakdown?   No          Kevin Prevention initiated:  No   Wound Care Orders initiated:  No      [facility] nurse consulted for Pressure Injury (Stage 3,4, Unstageable, DTI, NWPT, Complex wounds)and New or Established Ostomies:  No      Primary Nurse eSignature: Electronically signed by Farzad Cantrell RN on 9/28/20 at 6:19 AM EDT

## 2020-09-28 NOTE — CONSULTS
Gastroenterology Consult Note    Patient:   Giselle Wilson   :    1980   Facility:     [address] [facility] [address] [address]   Referring/PCP: No primary care provider on file. Date:     2020  Consultant:   Sary Bro DO    Subjective: This 41 y.o. male was admitted 2020 with a diagnosis of \"Abdominal pain [R10.9]  Abdominal pain [R10.9]\" and is seen in consultation regarding abnormal imaging    40 yo male with stage 4 colon cancer with abdominal pain. Ct scan demonstrated colon mass with tumor invasion into the small bowel wall and 1-2 enterocolonic fistulas. Also has liver invasion and lymphadenopathy. Treatment options are limited. Also has a 5.3 cm cystic lesion adjacent to the stomach. It measured 1.4 cm previously. Has been on high dose narcotics for pain without relief. Having difficulty with oral intake complaining of nausea and emesis. Concern for gastrocutaneous or gastrocutaneous fistula with surgical intervention.     Past Medical History:   Diagnosis Date    Acute pancreatitis     Cancer ([facility] [address] 2015    colon, COLECTOMY, CHEMO, IMMUNE THERAPY    GERD (gastroesophageal reflux disease)     Hyperlipidemia     Hypertension     Pneumonia     Thyroid disease     VRE infection (vancomycin resistant Enterococcus) 2016    abscess, ABD     Past Surgical History:   Procedure Laterality Date    COLON SURGERY  2015    COLONOSCOPY      bx polyps    HERNIA REPAIR      HERNIA REPAIR N/A 2019    DAVINCI INCISIONAL HERNIA  REPAIR  OF RECURRENT INCISIONAL HERNIA WITH JANI BLOCK FOR PAIN CONTROL performed by Florence Leger MD at [address] [place] [address] Right 2018    OTHER SURGICAL HISTORY  2015    lap converted to open right colectomy    OTHER SURGICAL HISTORY Left 2015    mediport    OTHER SURGICAL HISTORY  2016    hernia repair and bowel resection       Social: Social History     Tobacco Use    Smoking status: Current Every Day Smoker     Packs/day: 0.50     Years: 10.00     Pack years: 5.00     Types: Cigarettes    Smokeless tobacco: Never Used    Tobacco comment: currently smoking 1/2 ppd   Substance Use Topics    Alcohol use: Yes     Comment: RARELY, was a heavy drinker     Family: No family history on file. Scheduled Medications:    levofloxacin  750 mg Intravenous Q24H    sodium chloride flush  10 mL Intravenous 2 times per day    famotidine  10 mg Oral BID     Infusions:   PRN Medications: sodium chloride flush, potassium chloride **OR** potassium alternative oral replacement **OR** potassium chloride, magnesium sulfate, acetaminophen **OR** acetaminophen, bisacodyl, sodium phosphate, promethazine **OR** ondansetron, oxyCODONE, HYDROmorphone  Allergies: No Known Allergies    ROS:   Review of Systems   Constitutional: Negative. HENT: Negative. Eyes: Negative. Respiratory: Negative. Cardiovascular: Negative. Gastrointestinal: Positive for abdominal pain and nausea. Endocrine: Negative. Genitourinary: Negative. Musculoskeletal: Negative. Skin: Negative. Allergic/Immunologic: Negative. Neurological: Negative. Hematological: Negative. Psychiatric/Behavioral: Negative. Objective:     Physical Exam:   Vitals:    09/28/20 1610   BP: 115/74   Pulse: 88   Resp: 24   Temp: 98.2 °F (36.8 °C)   SpO2: 90%     I/O last 3 completed shifts:   In: 1300 [IV Piggyback:1300]  Out: -    General appearance: appears stated age and cooperative  HEENT: PERRLA  Neck: no adenopathy, no carotid bruit, no JVD, supple, symmetrical, trachea midline and thyroid not enlarged, symmetric, no tenderness/mass/nodules  Lungs: clear to auscultation bilaterally  Heart: regular rate and rhythm, S1, S2 normal, no murmur, click, rub or gallop  Abdomen: soft, non-tender; bowel sounds normal; no masses,  no organomegaly  Extremities: extremities normal, atraumatic, no cyanosis or edema     Lab and Imaging Review   Labs:  CBC:   Recent Labs     09/28/20  0016   WBC 14.3*   HGB 7.4*   HCT 24.8*   MCV 65.2*   *     BMP:   Recent Labs     09/28/20  0016   *   K 3.0*   CL 86*   CO2 30   BUN 6*   CREATININE 1.0     LIVER PROFILE:   Recent Labs     09/28/20  0016   AST 19   ALT 12   LIPASE 46.0   PROT 8.1   BILITOT 0.5   ALKPHOS 89     PT/INR:   Recent Labs     09/28/20  0828   INR 1.35*       IMAGING:  Ct Abdomen Pelvis Wo Contrast Additional Contrast? Oral    Result Date: 9/28/2020  EXAMINATION: CT OF THE ABDOMEN AND PELVIS WITHOUT CONTRAST 9/28/2020 1:50 am TECHNIQUE: CT of the abdomen and pelvis was performed without the administration of intravenous contrast. Multiplanar reformatted images are provided for review. Dose modulation, iterative reconstruction, and/or weight based adjustment of the mA/kV was utilized to reduce the radiation dose to as low as reasonably achievable. COMPARISON: 05/22/2020 HISTORY: ORDERING SYSTEM PROVIDED HISTORY: Worsening ab pain, h/o colon cancer TECHNOLOGIST PROVIDED HISTORY: Reason for exam:->Worsening ab pain, h/o colon cancer Additional Contrast?->Oral Reason for Exam: Generalized abd pain and loss of appetite; Hx stage 4 colon ca in 2015 Acuity: Acute Type of Exam: Initial Relevant Medical/Surgical History: Hx coletomy, hernia repair, pancreatitis FINDINGS: Lower Chest: No acute process in the lung bases. Unchanged 6 mm right middle lobe pulmonary nodule. Unchanged partially calcified periaortic lymph nodes in the lower chest and at the hiatus. Organs: The solid organs are incompletely evaluated without intravenous contrast.  As before, there are nodular masses along the intraperitoneal portion of the right hemidiaphragm, likely with external compression into hepatic segment 7. Invasion is not definitely excluded. There is external compression of the pancreatic tail related to a cystic lesion in the gastric wall. Mild irregularity of the distal pancreatic duct appears similar to the comparison study. GI/Bowel: There is a 3.7 x 5.3 x 4.4 cm cystic lesion between the stomach and pancreatic tail which is favored to originate from the gastric wall. A 1.4 cm low-attenuation focus was seen in this region on the comparison study. There is a large infiltrative mass at the hepatic flexure. No extraluminal contrast leakage, however, there do appear to be 1 and possibly 2 enterocolonic fistulas (coronal series 601, image 70 and axial images 83 and 84). A cluster of small bowel loops is noted in this region without evidence for obstruction. There is infiltration of the mass into the wall of the adjacent small bowel. Pelvis: The prostate and seminal vesicles are within normal limits. The urinary bladder is partially distended without contour abnormality. Peritoneum/Retroperitoneum: Trace soft tissue stranding about the tail of the pancreas. Stranding is also noted in the right upper quadrant about the colonic mass. Small fluid tracks into the right pericolic gutter. An enlarged aortocaval lymph node measuring 2.3 cm in short axis is increased since the comparison study. Numerous small lymph nodes are noted in the small bowel mesentery. Slight increase in the size of an anterior tumor deposits subjacent to the right hemidiaphragm. This previously measured 4.4 x 3.2 cm and now measures 4.8 x 3.4 cm. Bones/Soft Tissues: No acute or aggressive osseous lesions. 1. A cluster and loop of small bowel in the right upper quadrant adjacent to the known colonic mass demonstrates tumor invasion into the small bowel wall with at least 1 and possibly 2 enterocolonic fistulas. 2. Increased size of a cystic appearing lesion along the greater curvature of the stomach which measures up to 5.3 cm. This is of unclear clinical significance but does not have an appearance similar to the tumor deposits elsewhere.   Pancreatic pseudocysts could be considered. There is mild irregularity with small stranding about the tail of the pancreas. Correlate with lipase levels. 3. Unchanged 6 mm right middle lobe pulmonary nodule. 4. Periaortic, retrocrural and retroperitoneal lymphadenopathy, some of which is increased since the comparison study. 5. Small reactive mesenteric stranding. 6. Slightly increased size of an anterior subdiaphragmatic tumor deposit. Overall stable right posterior subdiaphragmatic tumor deposits with possible extension into the liver versus external compression. Xr Chest Portable    Result Date: 9/28/2020  EXAMINATION: ONE XRAY VIEW OF THE CHEST 9/28/2020 4:42 am COMPARISON: 05/24/2019 HISTORY: ORDERING SYSTEM PROVIDED HISTORY: Sepsis TECHNOLOGIST PROVIDED HISTORY: Reason for exam:->Sepsis Reason for Exam: Sepsis Acuity: Acute Type of Exam: Initial FINDINGS: There is an implanted left-sided chest port. Cardiac mediastinal silhouettes appear overall stable. No cardiac enlargement is identified. Mild patchy airspace disease seen at the lung bases. No pneumothorax is identified. No acute osseous abnormality is identified. Mild patchy bibasilar airspace disease which may represent pneumonia. Hospital Problems           Last Modified POA    Intractable abdominal pain 9/28/2020 Yes        Assessment:   40 yo male with stage 4 colon cancer c/b enterocolonic fistulas with nausea, vomiting, abdominal pain and large cystic lesion seen adjacent to the stomach. Plan:   1. Unfortunately lack of further viable treatment options for his colon cancer. Planning palliative care consult  2. Could consider EUS with drainage of cystic lesion seen on imaging if contributing to his symptoms but lack the capability to do EUS at [facility] and lack surgical back up at [facility]. Can consider drainage as outpatient if unable to control symptoms.       Teto Egan DO  5:10 PM 9/28/2020            [address] [facility] [address] [facility]     Phone: [phone]     Fax: [phone]

## 2020-09-28 NOTE — PROGRESS NOTES
has a past surgical history that includes Colonoscopy (2015); other surgical history (05/28/2015); Colon surgery (05/2015); other surgical history (Left, 06/2015); other surgical history (07/06/2016); Hydrocele surgery (Right, 04/25/2018); hernia repair; and hernia repair (N/A, 6/19/2019). Restrictions  Restrictions/Precautions  Restrictions/Precautions: General Precautions  Position Activity Restriction  Other position/activity restrictions: IV lines  Vision/Hearing  Vision: Within Functional Limits  Hearing: Within functional limits     Subjective  General  Chart Reviewed: Yes  Patient assessed for rehabilitation services?: Yes  Additional Pertinent Hx: Stage IV colon CA  Family / Caregiver Present: No  Referring Practitioner: Dr. Tamiko Medellin  Referral Date : 09/28/20  Diagnosis: Abdominal pain  Follows Commands: Within Functional Limits  General Comment  Comments: Pt resting in bed upon entry of PT  Subjective  Subjective: Pt agreeable to work with PT this morning with mod encouragement needed for OOB activity. Pt voices frustration about PT seeing him while in the hospital (\"You guys are focused on the wrong thing right now, I'm dying\") and his current high pain level. \"What they're giving me isn't touching this pain. \"  Pain Screening  Patient Currently in Pain: Yes  Pain Assessment  Pain Assessment: 0-10  Pain Level: 9  Pain Type: Chronic pain  Pain Location: Abdomen  Pain Orientation: Lower  Pain Descriptors: Aching;Sore;Constant  Pain Frequency: Continuous  Non-Pharmaceutical Pain Intervention(s): Ambulation/Increased Activity;Repositioned; Emotional support;Distraction  Intervention List: Patient able to continue with treatment;Nurse/physician notified;Nurse called to administer meds    Orientation  Orientation  Overall Orientation Status: Within Normal Limits     Social/Functional History  Social/Functional History  Lives With: Family(2 children (13 y/o, 15 y/o))  Type of Home: Apartment  Home Layout: One level  Home Access: Stairs to enter with rails  Entrance Stairs - Number of Steps: 15 steps up to apartment level  Entrance Stairs - Rails: Right(ascending)  Bathroom Shower/Tub: Tub/Shower unit  Bathroom Toilet: Standard  Home Equipment: (no home DME)  ADL Assistance: Independent  Homemaking Assistance: Independent  Ambulation Assistance: Independent(without AD)  Transfer Assistance: Independent  Active : Yes  Occupation: Other(comment)  Type of occupation: Currently off work due to medical reasons    Objective  Observation/Palpation  Posture: Fair  Observation: Increased forward trunk flexion at all times 2* mod/severe abdominal pain    RLE AROM: WFL  LLE AROM : WFL  Strength RLE: WFL  Strength LLE: WFL     Bed mobility  Supine to Sit: Independent  Sit to Supine: Independent  Scooting: Independent     Transfers  Sit to Stand: Independent  Stand to sit: Independent     Ambulation  Surface: level tile  Device: No Device(pt pushing own IV pole)  Assistance: Modified Independent  Quality of Gait: Pt amb with quick radha and step-through gait pattern. Gait pattern essentially normal expect for increased forward trunk flexion 2* mod/severe abdominal pain. Distance: x 50 feet  Comments: Amb distance limited by pain and irritability. Stairs/Curb  Stairs?: No(deferred stair amb 2* mod/severe abdominal pain and increased pt irritability; PT anticipates no difficulty with pt navigating stairs to his apartment given his high level of (I) with gait over level surfaces)     Balance  Posture: Fair  Sitting - Static: Good  Sitting - Dynamic: Good;-  Standing - Static: Good  Standing - Dynamic: Good;-    Plan   Plan: D/C from acute PT services  Safety Devices:  All fall risk precautions in place, Call light within reach, Left in bed, Nurse notified    AM-PAC Score  AM-PAC Inpatient Mobility Raw Score : 23 (09/28/20 1158)  AM-PAC Inpatient T-Scale Score : 56.93 (09/28/20 1158)  Mobility Inpatient CMS 0-100% Score: 11.2 (09/28/20 1158)  Mobility Inpatient CMS G-Code Modifier : CI (09/28/20 1158)    Goals  Short term goals  Time Frame for Short term goals: 1 visit - 9/28/20  Short term goal 1: Pt will demonstrate (I) with supine <-> sit - MET 9/28/20  Short term goal 2: Pt will demonstrate (I) with sit <-> stand transfers - MET 9/28/20  Short term goal 3: Pt will ambulate x 50 feet without AD with supervision or less - MET 9/28/20  Patient Goals   Patient goals : \"I don't have any goals, I'm dying anyway. \"       Therapy Time   Individual Concurrent Group Co-treatment   Time In 1115         Time Out 1133         Minutes 18         Timed Code Treatment Minutes: [address],[facility] [address], [place], DPT #[id number]

## 2020-09-28 NOTE — ED NOTES
Patient in stretcher with IV infusing. Patient is alert and oriented, aileen light in stretcher. No needs at this time. Patient placed on tele.       [facility], [address] [place] [address]  09/28/20 3113

## 2020-09-28 NOTE — ED NOTES
Nurse to room, IV started, blood drawn and sent to lab.  Patient is not a fall risk     Roberta Rose RN  09/28/20 6909

## 2020-09-28 NOTE — CONSULTS
Hematology/Oncology Consultation         Patient:   Arely Briseno       Reason for Consult:  Colon cancer   Requesting Physician: No ref. provider found    Chief Complaint:     Chief Complaint   Patient presents with    Abdominal Pain     hx of stage 4 colon cancer / generalized abd pain / decreased appetite / 'just haven't been able to digest anything for the past 4 days'    Emesis                  History Obtained from:     patient, electronic medical record    History of Present Illness:     Arely Briseno is a 41 y.o. male  with significant past medical history of stage 4 colon cancer who presents with abd pain and new fistula. We are consulted to discuss treatment options. He was recently seen at [facility] without any viable standard of care treatment options available. GI and general surgery have been consulted.      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    abscess, ABD       Past Surgical History:         Procedure Laterality Date    COLON SURGERY  05/2015    COLONOSCOPY  2015    bx polyps    HERNIA REPAIR      HERNIA REPAIR N/A 6/19/2019    DAVINCI INCISIONAL HERNIA  REPAIR  OF RECURRENT INCISIONAL HERNIA WITH JANI BLOCK FOR PAIN CONTROL performed by Jennine Leventhal, MD at [address] [place] [address] Right 04/25/2018    OTHER SURGICAL HISTORY  05/28/2015    lap converted to open right colectomy    OTHER SURGICAL HISTORY Left 06/2015    [facility]    OTHER SURGICAL HISTORY  07/06/2016    hernia repair and bowel resection       Current Medications:     Current Facility-Administered Medications   Medication Dose Route Frequency Provider Last Rate Last Dose    levoFLOXacin (LEVAQUIN) 750 MG/150ML infusion 750 mg 750 mg Intravenous Q[facility]H [facility] DO   Stopped at 09/28/20 0700    sodium chloride flush 0.9 % injection 10 mL  10 mL Intravenous 2 times per day Avery Angel Pizanojazi, DO   10 mL at 09/28/20 7144    sodium chloride flush 0.9 % injection 10 mL  10 mL Intravenous PRN [facility] Angel Pizanojazi, DO   10 mL at 09/28/20 0808    potassium chloride (KLOR-CON M) extended release tablet 40 mEq  40 mEq Oral PRN [facility] Bladimir, DO        Or    potassium bicarb-citric acid (EFFER-K) effervescent tablet 40 mEq  40 mEq Oral PRN [facility] ESTEBAN Bladimir, DO        Or    potassium chloride 10 mEq/100 mL IVPB (Peripheral Line)  10 mEq Intravenous PRN [facility] Angel ORELLANA Bladimir, DO        magnesium sulfate 2 g in 50 mL IVPB premix  2 g Intravenous PRN [facility] Bladimir, DO        acetaminophen (TYLENOL) tablet 650 mg  650 mg Oral Q6H PRN [facility]adimir, DO        Or    acetaminophen (TYLENOL) suppository 650 mg  650 mg Rectal Q6H PRN [facility] P[facility], DO        bisacodyl (DULCOLAX) EC tablet 5 mg  5 mg Oral Daily PRN [facility]        sodium phosphate (FLEET) enema 1 enema  1 enema Rectal Daily PRN [facility] ORELLANA Bladimir, DO        promethazine (PHENERGAN) tablet 12.5 mg  12.5 mg Oral Q6H PRN [facility] Garrison, DO        Or    ondansetron (ZOFRAN) injection 4 mg  4 mg Intravenous Q6H PRN [facility] A Bladimir, DO        famotidine (PEPCID) tablet 10 mg  10 mg Oral BID [facility] EST[facility] Bladimir, DO   10 mg at 09/28/20 0558    oxyCODONE (OXY-IR) immediate release tablet 30 mg  30 mg Oral Q4H PRN [facility] EST[facility] Bladimir, DO   30 mg at 09/28/20 0558    oxyCODONE (ROXICODONE) immediate release tablet 20 mg  20 mg Oral Q4H PRN mad A Bladimir, DO           Allergies:     No Known Allergies    Social History:     Social History     Socioeconomic History    Marital status:      Spouse name: Not on file    Number of children: 2    Years of education: Not on file    Highest education level: Not on file   Occupational History    Not on file   Social Needs    Financial resource strain: Not on file    Food insecurity     Worry: Not on file     Inability: Not on file    Transportation needs     Medical: Not on file     Non-medical: Not on file   Tobacco Use    Smoking status: Current Every Day Smoker     Packs/day: 0.50     Years: 10.00     Pack years: 5.00     Types: Cigarettes    Smokeless tobacco: Never Used    Tobacco comment: currently smoking 1/2 ppd   Substance and Sexual Activity    Alcohol use: Yes     Comment: RARELY, was a heavy drinker    Drug use: Not Currently     Types: Marijuana     Comment: STOPPED 1/2018    Sexual activity: Not on file   Lifestyle    Physical activity     Days per week: Not on file     Minutes per session: Not on file    Stress: Not on file   Relationships    Social connections     Talks on phone: Not on file     Gets together: Not on file     Attends Scientologist service: Not on file     Active member of club or organization: Not on file     Attends meetings of clubs or organizations: Not on file     Relationship status: Not on file    Intimate partner violence     Fear of current or ex partner: Not on file     Emotionally abused: Not on file     Physically abused: Not on file     Forced sexual activity: Not on file   Other Topics Concern    Not on file   Social History Narrative    Not on file     Social History     Substance and Sexual Activity   Drug Use Not Currently    Types: Marijuana    Comment: STOPPED 1/2018     Social History     Substance and Sexual Activity   Alcohol Use Yes    Comment: RARELY, was a heavy drinker     Social History     Substance and Sexual Activity   Sexual Activity Not on file     Social History     Tobacco Use   Smoking Status Current Every Day Smoker    Packs/day: 0.50    Years: 10.00    Pack years: 5.00    Types: Cigarettes   Smokeless Tobacco Never Used   Tobacco Comment    currently smoking 1/2 ppd       Family History:     No family history on file.     Review of Systems:     Constitutional: Denies fever, sweats, weight loss. .     Eyes: No visual changes or diplopia. No scleral icterus. ENT: No Headaches, no hearing loss, no  vertigo. No mouth sores or sore throat. Cardiovascular: No chest pain, no dyspnea on exertion, no palpitations, no  loss of consciousness. Respiratory: No cough, no  wheezing, no dyspnea, no sputum production. No hemoptysis. .    Gastrointestinal: see HPI   Genitourinary: No dysuria, trouble voiding, or hematuria. Musculoskeletal:  Generalized weakness. No joint complaints. Integumentary: No rash or pruritis. Neurological: No headache, diplopia. No change in gait, balance, or coordination. No paresthesias. Endocrine: No temperature intolerance. No excessive thirst, fluid intake, or urination. Hematologic/Lymphatic: No abnormal bruising or ecchymoses, no blood clots or swollen lymph nodes. Allergic/Immunologic: No nasal congestion or hives. Physical Exam:     /69   Pulse 96   Temp 98.6 °F (37 °C) (Oral)   Resp 18   Ht 5' 10\" (1.778 m)   Wt 200 lb (90.7 kg)   SpO2 94%   BMI 28.70 kg/m²     CONSTITUTIONAL: awake, alert, cooperative, no apparent distress. EYES:  Pupils equal, round and reactive to light, sclera non-icteric, conjunctiva normal  ENT:  Normocephalic, without obvious abnormality, atraumatic, sinuses nontender on palpation, external ears without lesions, oral pharynx with moist mucus membranes, no mucositis.   NECK:  Supple, symmetrical, trachea midline, no adenopathy, thyroid symmetric, not enlarged and no tenderness, skin normal  HEMATOLOGIC/LYMPHATICS:  no cervical lymphadenopathy, no supraclavicular lymphadenopathy, no axillary lymphadenopathy and no inguinal lymphadenopathy  BACK:  Symmetric, no curvature, spinous processes are non-tender on palpation, paraspinous muscles are non-tender on palpation, no costal vertebral tenderness  LUNGS:  Clear to auscultation bilaterally, no crackles or wheezing  CARDIOVASCULAR:  Regular rate and rhythm, normal S1 and S2, no S3 or S4, and no murmur noted  ABDOMEN:  Normal bowel sounds, soft,   distended, non-tender, no masses palpated, no hepatosplenomegally  MUSCULOSKELETAL:  There is no redness, warmth, or swelling of the joints  NEUROLOGIC:   No focal findings. SKIN:  Scattered bruising and ecchymoses. EXT: without clubbing, cyanosis or edema. Data:     CBC:  Recent Labs     09/28/20  0016   WBC 14.3*   HGB 7.4*   HCT 24.8*   MCV 65.2*   *       BMP:  Recent Labs     09/28/20  0016   *   K 3.0*   CO2 30   BUN 6*   CREATININE 1.0       HEPATIC:  Recent Labs     09/28/20  0016   AST 19   ALT 12   ALKPHOS 89   PROT 8.1   BILITOT 0.5       TUMOR MARKERS:  No results for input(s): PSA, CEA, , [id number],  in the last 720 hours. MAGNESIUM:    Lab Results   Component Value Date    MG 2.20 05/24/2020       PT/INR:    No results found for: PTINR    Lab Results   Component Value Date    INR 1.35 09/28/2020       PTT:    No results found for: APTT    U/A:    Lab Results   Component Value Date    NITRITE neg 05/30/2013    COLORU Straw 05/22/2020    PHUR 7.0 05/22/2020    LABCAST 1-3 Hyaline 09/08/2016    WBCUA 0-2 09/08/2016    RBCUA 3-5 09/08/2016    BACTERIA 1+ 08/18/2016    CLARITYU Clear 05/22/2020    SPECGRAV <=1.005 05/22/2020    LEUKOCYTESUR Negative 05/22/2020    UROBILINOGEN 0.2 05/22/2020    BILIRUBINUR Negative 05/22/2020    BILIRUBINUR neg 05/30/2013    BLOODU Negative 05/22/2020    GLUCOSEU Negative 05/22/2020    AMORPHOUS 4+ 08/18/2016       Imaging:     CT A/P       1. A cluster and loop of small bowel in the right upper quadrant adjacent to    the known colonic mass demonstrates tumor invasion into the small bowel wall    with at least 1 and possibly 2 enterocolonic fistulas.     2. Increased size of a cystic appearing lesion along the greater curvature of    the stomach which measures up to 5.3 cm.  This is of unclear clinical    significance but does not have an appearance similar to the tumor deposits    elsewhere.  Pancreatic pseudocysts could be considered. Suann Pillar is mild    irregularity with small stranding about the tail of the pancreas.  Correlate    with lipase levels. 3. Unchanged 6 mm right middle lobe pulmonary nodule. 4. Periaortic, retrocrural and retroperitoneal lymphadenopathy, some of which    is increased since the comparison study. 5. Small reactive mesenteric stranding. 6. Slightly increased size of an anterior subdiaphragmatic tumor deposit. Overall stable right posterior subdiaphragmatic tumor deposits with possible    extension into the liver versus external compression. Impression:     Colon CA with mets   Fistula   Abd Pain     Plan:     Stage IV Colon CA  -Unfortunate situation, seen at  recently without any standard of care treatment options available   - Dr. Flores Hopes to see in the AM and discuss lack of viable treatment options  - Agree with palliative care consult     Anemia with thrombocytosis  - iron labs pending, replace PRN   - transfuse to keep Hgb over 7- now 7.4. No transfusions today. Thank you very much for allowing me to participate in the care of this patient.     Phyllis Larson CNP   Medical Oncology/Hematology    [facility] - [address],  [place]   Phone: [phone]  Fax: [phone]

## 2020-09-28 NOTE — ED NOTES
Patient resting in bed with call light in lap. Patient updated on plan of care and intention to admit.       [facility]  09/28/20 8516

## 2020-09-28 NOTE — H&P
Hospital Medicine History & Physical      PCP: No primary care provider on file. Date of Admission: 9/28/2020    Date of Service: Pt seen/examined on   Pt seen/examined face to face on and admitted as inpatient with expected LOS greater than two midnights due to medical therapy    Chief Complaint: Nausea vomiting abdominal pain      History Of Present Illness:      41 y.o. male who presented to [facility] with past medical history of stage IV cancer status post chemo immunotherapy colectomy currently not receiving any treatment, hypertension hyperlipidemia, hypothyroidism, VRE abdominal abscess presented to the ED for nausea vomiting and abdominal pain. Patient reported that he normally has abdominal pain that is right-sided and is chronic however the abdominal pain has progressed and is now worsened bilateral back in addition to bilateral lateral abdomen with associated nausea and vomiting. Patient reported he has not eaten anything the past 36 hours and has been taking oxycodone as prescribed to alleviate the pain. Patient had 2 palliative care appointments that he has missed because he reported to be end-of-life almost.  CODE STATUS was discussed and the patient proceeded with a full code and would like to talk to a specialist to see what other things could be offered. Patient usually takes at home Percocet 20 every 4 hours.     Past Medical History:          Diagnosis Date    Acute pancreatitis     Cancer [address] [address] 05/2015    colon, COLECTOMY, CHEMO, IMMUNE THERAPY    GERD (gastroesophageal reflux disease)     Hyperlipidemia     Hypertension     Pneumonia     Thyroid disease     VRE infection (vancomycin resistant Enterococcus) 07/14/2016    abscess, ABD       Past Surgical History:          Procedure Laterality Date    COLON SURGERY  05/2015    COLONOSCOPY  2015    bx polyps    HERNIA REPAIR      HERNIA REPAIR N/A 6/19/2019    DAVINCI INCISIONAL HERNIA  REPAIR  OF RECURRENT INCISIONAL HERNIA WITH JANI BLOCK FOR PAIN CONTROL performed by Ray Zamudio MD at [facility] Right 04/25/2018    OTHER SURGICAL HISTORY  05/28/2015    lap converted to open right colectomy    OTHER SURGICAL HISTORY Left 06/2015    mediport    OTHER SURGICAL HISTORY  07/06/2016    hernia repair and bowel resection       Medications Prior to Admission:      Prior to Admission medications    Medication Sig Start Date End Date Taking? Authorizing Provider   oxyCODONE (ROXICODONE) 20 MG immediate release tablet Take 20 mg by mouth every 4 hours as needed. 9/15/20 10/15/20 Yes Historical Provider, MD   methadone (DOLOPHINE) 10 MG tablet Take 10 mg by mouth every 8 hours as needed. 9/15/20 10/15/20 Yes Historical Provider, MD   naloxone 4 MG/0.1ML LIQD nasal spray 1 spray by Nasal route as needed for Opioid Reversal 5/25/20   Lindwood Cabot, MD       Allergies:  Patient has no known allergies. Social History:          TOBACCO:   reports that he has been smoking cigarettes. He has a 5.00 pack-year smoking history. He has never used smokeless tobacco.  ETOH:   reports current alcohol use. E-Cigarettes Vaping or Juuling     Questions Responses    Vaping Use Never User    Start Date     Does device contain nicotine? Never    Quit Date     Vaping Type             Family History:      Reviewed in detail     No family history on file.     REVIEW OF SYSTEMS:     Constitutional:  No Fever, No Chills, No Night Sweats  ENT/Mouth:  No Nasal Congestion,  No Hoarseness, No new mouth lesion  Eyes:  No Eye Pain, No Redness, No Discharge  Cardiovascular:  No Chest Pain, No Orthopnea, No Palpitations  Respiratory:  No Cough, No Sputum, No Dyspnea  Gastrointestinal:  No Nausea, No Vomiting, No Diarrhea,   Urinary Frequency, No Hematuria, No Urinary pain, No urinary discharge  Musculoskeletal:  No worsening Arthralgias, No worsening Myalgias  Skin:  No new Skin Lesions, No new skin rash  Neuro:  No new weakness, No new numbness. Psych:  No suicial ideation, No Violence ideation    PHYSICAL EXAM PERFORMED:    BP 98/65   Pulse 101   Temp 97.3 °F (36.3 °C) (Oral)   Resp 17   Ht 5' 10\" (1.778 m)   Wt 200 lb (90.7 kg)   SpO2 100%   BMI 28.70 kg/m²     General appearance:  mild acute distress, appears older than stated age  HEENT:   atraumatic, sclera anicteric, Conjunctivae clear. Neck: Supple,Trachea midline, no goiter  Respiratory:  Normal respiratory effort. Clear to auscultation, bilaterally without Rales/Wheezes/Rhonchi. Cardiovascular:  Regular rate and rhythm without murmurs, capillary refill 2 seconds  Abdomen: Soft, tender, distended with normal bowel sounds. No guarding or peritoneal sign no CVA tenderness  Musculoskeletal:  No clubbing, cyanosis or edema bilaterally. Skin: turgor normal.  No new rashes or lesions. Neurologic: Alert and oriented x4, no new focal sensory/motor deficits. Labs:     Recent Labs     09/28/20  0016   WBC 14.3*   HGB 7.4*   HCT 24.8*   *     Recent Labs     09/28/20  0016   *   K 3.0*   CL 86*   CO2 30   BUN 6*   CREATININE 1.0   CALCIUM 9.1     Recent Labs     09/28/20  0016   AST 19   ALT 12   BILITOT 0.5   ALKPHOS 89     No results for input(s): INR in the last 72 hours. No results for input(s): Adrian Oiler in the last 72 hours. Urinalysis:      Lab Results   Component Value Date    NITRU Negative 05/22/2020    WBCUA 0-2 09/08/2016    BACTERIA 1+ 08/18/2016    RBCUA 3-5 09/08/2016    BLOODU Negative 05/22/2020    SPECGRAV <=1.005 05/22/2020    GLUCOSEU Negative 05/22/2020       Radiology:     CXR: I have reviewed the CXR with the following interpretation:   pending  EKG:  I have reviewed the EKG with the following interpretation:   pending    CT ABDOMEN PELVIS WO CONTRAST Additional Contrast? Oral   Preliminary Result   1.  A cluster and loop of small bowel in the right upper quadrant adjacent to   the known colonic mass demonstrates tumor invasion into the small bowel wall   with at least 1 and possibly 2 entero colonic fistulas. 2. Increased size of a cystic appearing lesion along the greater curvature of   the stomach which measures up to 5.3 cm. This is of unclear clinical   significance but does not have an appearance similar to the tumor deposits   elsewhere. Pancreatic pseudocysts could be considered. There is mild   irregularity with small stranding about the tail of the pancreas. Correlate   with lipase levels. 3. Unchanged 6 mm right middle lobe pulmonary nodule. 4. Periaortic retrocrural and retroperitoneal lymphadenopathy, some of which   is increased since the comparison study. 5. Small reactive mesenteric stranding. XR CHEST PORTABLE    (Results Pending)       ASSESSMENT AND PLAN:    Active Hospital Problems    Diagnosis Date Noted    Abdominal pain [R10.9] 05/25/2015     1. Abdominal pain with nausea vomiting:  Patient was noted to have 2 enterocolonic fistulas in addition to a increasing cystic appearing lesion in the greater curvature of the stomach around 5.3 cm unclear if this is tumor or abscess. Surgery and IR consulted    Sepsis secondary to suspected abdominal abscess:  Blood cultures obtained. Chest x-ray and UA pending  IV Levaquin and metronidazole    Stage IV colon cancer:  Patient was told multiple times that he has been optimized to what can be offered however patient would like to remain full code and explore other options.   Oncology consultation, palliative care consultation    Hypokalemia: Replaced    Hypovolemic hyponatremia: IVF given in the ED, continue to monitor    Microcytic anemia: Iron panel ordered,    Diet: NPO except meds ordered    PT/OT Eval Status: consulted     DVT Prophylaxis: Held due to risk of bleeding    Dispo:   Expected LOS greater than two [facility]

## 2020-09-28 NOTE — ED PROVIDER NOTES
[address] [address] - [facility] SURG/ORTHO  eMERGENCY dEPARTMENTeNCOUnter      Pt Name: Floyd James  MRN: [id number]  [place] 1980  Date of evaluation: 9/27/2020  Provider: Geri Fabian MD    CHIEF COMPLAINT       Chief Complaint   Patient presents with    Abdominal Pain     hx of stage 4 colon cancer / generalized abd pain / decreased appetite / 'just haven't been able to digest anything for the past 4 days'    Emesis         HISTORY OF PRESENT ILLNESS   (Location/Symptom, Timing/Onset,Context/Setting, Quality, Duration, Modifying Factors, Severity)  Note limiting factors. Floyd James is a 41 y.o. male who presents to the emergency department for worsening abdominal pain and inability to tolerate p.o. intake. The patient has a history of stage IV colon cancer. He is now palliated of care. He states that over the past 4 days he has had worsening generalized pain and not able to tolerate any solids. He has been able to get fluids. Whenever he tries to eat he vomits. He also had constipation for a few days but his wife gave him an oral laxative and 2 days ago he had a nonbloody bowel movement. He has also noticed that he appears more pale than usual.  He feels exhausted and he is concerned about a bowel obstruction should he came to the hospital.    Nursing notes were reviewed. REVIEW OF SYSTEMS    (2-9 systems for level 4, 10 or more for level 5)     Review of Systems    Positive and pertinent negative as per HPI. Except as noted above in the ROS, all other systems were reviewed and were negative.     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    abscess, ABD         SURGICALHISTORY       Past Surgical History:   Procedure Laterality Date    COLON SURGERY  05/2015    COLONOSCOPY  2015    bx polyps    HERNIA REPAIR      HERNIA REPAIR N/A 6/19/2019    DAVINCI INCISIONAL HERNIA  REPAIR  OF RECURRENT INCISIONAL HERNIA WITH JANI BLOCK FOR PAIN CONTROL performed by Nato Beltre MD at [facility] Right 04/25/2018    OTHER SURGICAL HISTORY  05/28/2015    lap converted to open right colectomy    OTHER SURGICAL HISTORY Left 06/2015    mediport    OTHER SURGICAL HISTORY  07/06/2016    hernia repair and bowel resection         CURRENT MEDICATIONS       Current Discharge Medication List      CONTINUE these medications which have NOT CHANGED    Details   oxyCODONE (ROXICODONE) 20 MG immediate release tablet Take 20 mg by mouth every 4 hours as needed. methadone (DOLOPHINE) 10 MG tablet Take 10 mg by mouth every 8 hours as needed. ALLERGIES     Patient has no known allergies. FAMILY HISTORY     No family history on file.        SOCIAL HISTORY       Social History     Socioeconomic History    Marital status:      Spouse name: Not on file    Number of children: 2    Years of education: Not on file    Highest education level: Not on file   Occupational History    Not on file   Social Needs    Financial resource strain: Not on file    Food insecurity     Worry: Not on file     Inability: Not on file   [employer] needs     Medical: Not on file     Non-medical: Not on file   Tobacco Use    Smoking status: Current Every Day Smoker     Packs/day: 0.50     Years: 10.00     Pack years: 5.00     Types: Cigarettes    Smokeless tobacco: Never Used    Tobacco comment: currently smoking 1/2 ppd   Substance and Sexual Activity    Alcohol use: Yes     Comment: RARELY, was a heavy drinker    Drug use: Not Currently     Types: Marijuana     Comment: STOPPED 1/2018    Sexual activity: Not on file   Lifestyle    Physical activity     Days per week: Not on file     Minutes per session: Not on file    Stress: Not on file   Relationships    Social connections     Talks on phone: Not on file     Gets together: Not on file     Attends Jain service: Not on file     Active member of club or organization: Not on file     Attends meetings of clubs or organizations: Not on file     Relationship status: Not on file    Intimate partner violence     Fear of current or ex partner: Not on file     Emotionally abused: Not on file     Physically abused: Not on file     Forced sexual activity: Not on file   Other Topics Concern    Not on file   Social History Narrative    Not on file       SCREENINGS             PHYSICAL EXAM    (up to 7 for level 4, 8 or more for level 5)     ED Triage Vitals [09/27/20 2348]   BP Temp Temp Source Pulse Resp SpO2 Height Weight   117/64 97.3 °F (36.3 °C) Oral 127 20 100 % 5' 10\" (1.778 m) 200 lb (90.7 kg)       Physical Exam  Vitals signs and nursing note reviewed. Constitutional:       Appearance: Normal appearance. He is well-developed. He is not ill-appearing. HENT:      Head: Normocephalic and atraumatic. Right Ear: External ear normal.      Left Ear: External ear normal.      Nose: Nose normal.   Eyes:      General: No scleral icterus. Right eye: No discharge. Left eye: No discharge. Conjunctiva/sclera: Conjunctivae normal.   Neck:      Musculoskeletal: Neck supple. Cardiovascular:      Rate and Rhythm: Normal rate and regular rhythm. Heart sounds: Normal heart sounds. Pulmonary:      Effort: Pulmonary effort is normal. No respiratory distress. Breath sounds: Normal breath sounds. No wheezing or rales. Abdominal:      General: A surgical scar is present. Bowel sounds are increased. There is distension. Palpations: Abdomen is soft. Tenderness: There is generalized abdominal tenderness. There is guarding. Hernia: No hernia is present. Skin:     Coloration: Skin is not pale.    Neurological: Mental Status: He is alert. Psychiatric:         Mood and Affect: Mood normal.         Behavior: Behavior normal.           DIAGNOSTIC RESULTS     EKG: All EKG's are interpreted by the Emergency Department Physician who either signs or Co-signs this chart in the absence of a cardiologist.    12 lead EKG shows     RADIOLOGY:   Non-plain film images such as CT, Ultrasound and MRI are read by the radiologist. Plain radiographic images are visualized and preliminarily interpreted by the emergency physician with the below findings:        Interpretation per the Radiologist below, if available at the time of this note:    XR CHEST PORTABLE   Final Result   Mild patchy bibasilar airspace disease which may represent pneumonia. CT ABDOMEN PELVIS WO CONTRAST Additional Contrast? Oral   Preliminary Result   1. A cluster and loop of small bowel in the right upper quadrant adjacent to   the known colonic mass demonstrates tumor invasion into the small bowel wall   with at least 1 and possibly 2 entero colonic fistulas. 2. Increased size of a cystic appearing lesion along the greater curvature of   the stomach which measures up to 5.3 cm. This is of unclear clinical   significance but does not have an appearance similar to the tumor deposits   elsewhere. Pancreatic pseudocysts could be considered. There is mild   irregularity with small stranding about the tail of the pancreas. Correlate   with lipase levels. 3. Unchanged 6 mm right middle lobe pulmonary nodule. 4. Periaortic retrocrural and retroperitoneal lymphadenopathy, some of which   is increased since the comparison study. 5. Small reactive mesenteric stranding.                ED BEDSIDE ULTRASOUND:   Performed by ED Physician - none    LABS:  Labs Reviewed   CBC WITH AUTO DIFFERENTIAL - Abnormal; Notable for the following components:       Result Value    WBC 14.3 (*)     RBC 3.80 (*)     Hemoglobin 7.4 (*)     Hematocrit 24.8 (*)     MCV 65.2 (*) MCH 19.6 (*)     MCHC 30.0 (*)     RDW 17.8 (*)     Platelets 243 (*)     Neutrophils Absolute 11.2 (*)     All other components within normal limits    Narrative:     Performed at:  [address], [facility] [employer]   Phone [phone]   COMPREHENSIVE METABOLIC PANEL - Abnormal; Notable for the following components:    Sodium 131 (*)     Potassium 3.0 (*)     Chloride 86 (*)     Glucose 129 (*)     BUN 6 (*)     Albumin/Globulin Ratio 0.9 (*)     All other components within normal limits    Narrative:     Performed at:  [address], [facility] [employer]   Phone [phone]   CULTURE, BLOOD 2   CULTURE, BLOOD 1   LIPASE    Narrative:     Performed at:  [address], [facility] [employer]   Phone [phone]   LACTIC ACID, PLASMA    Narrative:     Performed at:  [address], [facility] [employer]   Phone [phone]   LACTIC ACID, PLASMA   URINALYSIS   FERRITIN   HEMOGLOBIN A1C   IRON AND TIBC   TROPONIN   TROPONIN   TSH WITH REFLEX       All other labs were within normal range or not returned as of this dictation. EMERGENCY DEPARTMENT COURSE and DIFFERENTIAL DIAGNOSIS/MDM:   Vitals:    Vitals:    09/28/20 0150 09/28/20 0217 09/28/20 0452 09/28/20 0545   BP: 111/61 98/65  113/76   Pulse: 113 95 101 93   Resp: 15 16 17 16   Temp:    98.4 °F (36.9 °C)   TempSrc:    Oral   SpO2: 99% 100% 100% 98%   Weight:       Height:           Adult male with a history of colon cancer who has had bowel obstructions in the past who comes in with worsening abdominal pain and vomiting. Laboratory studies ordered as well as a CT scan of his abdomen and pelvis with oral contrast.  The patient is given morphine and Zofran and IV fluids for his symptoms. Patient is reassessed he continues to have pain.   He is given 1 mg of IV Dilaudid. Laboratory studies are reviewed the patient has leukocytosis and he has microcytic anemia which is worsened from his previous visit in May. Patient also has marked thrombocytosis. He also has hypokalemia with signs of dehydration with hyponatremia and hypochloremia. CT scan is reviewed and the patient has signs of tumor invasion into the small bowel wall of enterocolonic fistulas. There is also a cystic lesion in the stomach. Patient is started on empiric antibiotics. A consult is placed to the hospitalist on duty. The patient is aware that he will need to be admitted to the hospital for further care. The patient is reassessed his tachycardia is improving but he continues to complain of pain. He is given an additional 1 mg of IV Dilaudid. I did speak to the hospitalist on duty who has agreed to admit this patient to his service. CRITICAL CARE TIME   Total Critical Care time was 35 minutes, excluding separately reportable procedures. There was a high probability of clinically significant/life threatening deterioration in the patient's condition which required my urgent intervention. CONSULTS:  IP CONSULT TO HOSPITALIST  IP CONSULT TO ONCOLOGY  IP CONSULT TO GENERAL SURGERY  IP CONSULT TO PALLIATIVE CARE  IP CONSULT TO RADIOLOGY    PROCEDURES:  Unless otherwise noted above, none     Procedures    FINAL IMPRESSION      1. Intractable abdominal pain    2. Regional enteritis of colon, with fistula ([address]    3. Electrolyte abnormality    4. Anemia, unspecified type          DISPOSITION/PLAN   DISPOSITION Admitted 09/28/2020 04:33:54 AM      PATIENT REFERREDTO:  No follow-up provider specified.     DISCHARGEMEDICATIONS:  Current Discharge Medication List             (Please note that portions of this note were completed with a voice recognition program.  Efforts were made to edit the dictations but occasionally words are mis-transcribed.)    Ted Rascon MD (electronically

## 2020-09-28 NOTE — ED NOTES
Patient resting in bed with IV infusing at this time. Denies needs, provided warm blanket and pillow.       Yola QuinonesGuthrie [facility] Hospital  09/28/20 9379

## 2020-09-29 LAB
A/G RATIO: 1 (ref 1.1–2.2)
ABO/RH: NORMAL
ALBUMIN SERPL-MCNC: 3.5 G/DL (ref 3.4–5)
ALP BLD-CCNC: 72 U/L (ref 40–129)
ALT SERPL-CCNC: 9 U/L (ref 10–40)
ANION GAP SERPL CALCULATED.3IONS-SCNC: 12 MMOL/L (ref 3–16)
ANISOCYTOSIS: ABNORMAL
ANTIBODY SCREEN: NORMAL
AST SERPL-CCNC: 12 U/L (ref 15–37)
BANDED NEUTROPHILS RELATIVE PERCENT: 19 % (ref 0–7)
BASOPHILIC STIPPLING: ABNORMAL
BASOPHILS ABSOLUTE: 0 K/UL (ref 0–0.2)
BASOPHILS RELATIVE PERCENT: 0 %
BILIRUB SERPL-MCNC: 0.3 MG/DL (ref 0–1)
BLOOD BANK DISPENSE STATUS: NORMAL
BLOOD BANK PRODUCT CODE: NORMAL
BPU ID: NORMAL
BUN BLDV-MCNC: 8 MG/DL (ref 7–20)
CALCIUM SERPL-MCNC: 8.7 MG/DL (ref 8.3–10.6)
CHLORIDE BLD-SCNC: 96 MMOL/L (ref 99–110)
CO2: 28 MMOL/L (ref 21–32)
CREAT SERPL-MCNC: 0.9 MG/DL (ref 0.9–1.3)
DESCRIPTION BLOOD BANK: NORMAL
EOSINOPHILS ABSOLUTE: 0.3 K/UL (ref 0–0.6)
EOSINOPHILS RELATIVE PERCENT: 4 %
GFR AFRICAN AMERICAN: >60
GFR NON-AFRICAN AMERICAN: >60
GLOBULIN: 3.6 G/DL
GLUCOSE BLD-MCNC: 92 MG/DL (ref 70–99)
HCT VFR BLD CALC: 21.7 % (ref 40.5–52.5)
HCT VFR BLD CALC: 23.6 % (ref 40.5–52.5)
HEMATOLOGY PATH CONSULT: NO
HEMOGLOBIN: 6.5 G/DL (ref 13.5–17.5)
HEMOGLOBIN: 7.1 G/DL (ref 13.5–17.5)
HYPOCHROMIA: ABNORMAL
LYMPHOCYTES ABSOLUTE: 2.8 K/UL (ref 1–5.1)
LYMPHOCYTES RELATIVE PERCENT: 33 %
MAGNESIUM: 2.4 MG/DL (ref 1.8–2.4)
MCH RBC QN AUTO: 19.7 PG (ref 26–34)
MCHC RBC AUTO-ENTMCNC: 30.1 G/DL (ref 31–36)
MCV RBC AUTO: 65.5 FL (ref 80–100)
MONOCYTES ABSOLUTE: 0.1 K/UL (ref 0–1.3)
MONOCYTES RELATIVE PERCENT: 1 %
MYELOCYTE PERCENT: 1 %
NEUTROPHILS ABSOLUTE: 5.3 K/UL (ref 1.7–7.7)
NEUTROPHILS RELATIVE PERCENT: 42 %
OVALOCYTES: ABNORMAL
PDW BLD-RTO: 17.3 % (ref 12.4–15.4)
PLATELET # BLD: 486 K/UL (ref 135–450)
PLATELET SLIDE REVIEW: ABNORMAL
PMV BLD AUTO: 7 FL (ref 5–10.5)
POLYCHROMASIA: ABNORMAL
POTASSIUM REFLEX MAGNESIUM: 3.4 MMOL/L (ref 3.5–5.1)
RBC # BLD: 3.31 M/UL (ref 4.2–5.9)
SCHISTOCYTES: ABNORMAL
SLIDE REVIEW: ABNORMAL
SODIUM BLD-SCNC: 136 MMOL/L (ref 136–145)
STOMATOCYTES: ABNORMAL
TEAR DROP CELLS: ABNORMAL
TOTAL PROTEIN: 7.1 G/DL (ref 6.4–8.2)
WBC # BLD: 8.6 K/UL (ref 4–11)

## 2020-09-29 PROCEDURE — 6360000002 HC RX W HCPCS: Performed by: INTERNAL MEDICINE

## 2020-09-29 PROCEDURE — 2580000003 HC RX 258: Performed by: INTERNAL MEDICINE

## 2020-09-29 PROCEDURE — 36430 TRANSFUSION BLD/BLD COMPNT: CPT

## 2020-09-29 PROCEDURE — 86923 COMPATIBILITY TEST ELECTRIC: CPT

## 2020-09-29 PROCEDURE — 85014 HEMATOCRIT: CPT

## 2020-09-29 PROCEDURE — 36415 COLL VENOUS BLD VENIPUNCTURE: CPT

## 2020-09-29 PROCEDURE — 80053 COMPREHEN METABOLIC PANEL: CPT

## 2020-09-29 PROCEDURE — 83735 ASSAY OF MAGNESIUM: CPT

## 2020-09-29 PROCEDURE — 96376 TX/PRO/DX INJ SAME DRUG ADON: CPT

## 2020-09-29 PROCEDURE — G0378 HOSPITAL OBSERVATION PER HR: HCPCS

## 2020-09-29 PROCEDURE — 85018 HEMOGLOBIN: CPT

## 2020-09-29 PROCEDURE — 86901 BLOOD TYPING SEROLOGIC RH(D): CPT

## 2020-09-29 PROCEDURE — 85025 COMPLETE CBC W/AUTO DIFF WBC: CPT

## 2020-09-29 PROCEDURE — 6370000000 HC RX 637 (ALT 250 FOR IP): Performed by: INTERNAL MEDICINE

## 2020-09-29 PROCEDURE — P9016 RBC LEUKOCYTES REDUCED: HCPCS

## 2020-09-29 PROCEDURE — 96366 THER/PROPH/DIAG IV INF ADDON: CPT

## 2020-09-29 PROCEDURE — 86900 BLOOD TYPING SEROLOGIC ABO: CPT

## 2020-09-29 PROCEDURE — 86850 RBC ANTIBODY SCREEN: CPT

## 2020-09-29 RX ORDER — OXYCODONE HYDROCHLORIDE 15 MG/1
30 TABLET ORAL EVERY 4 HOURS PRN
Status: DISCONTINUED | OUTPATIENT
Start: 2020-09-29 | End: 2020-10-01 | Stop reason: HOSPADM

## 2020-09-29 RX ORDER — 0.9 % SODIUM CHLORIDE 0.9 %
20 INTRAVENOUS SOLUTION INTRAVENOUS ONCE
Status: COMPLETED | OUTPATIENT
Start: 2020-09-29 | End: 2020-09-29

## 2020-09-29 RX ADMIN — LEVOFLOXACIN 750 MG: 750 INJECTION, SOLUTION INTRAVENOUS at 04:21

## 2020-09-29 RX ADMIN — OXYCODONE HYDROCHLORIDE 30 MG: 15 TABLET ORAL at 09:26

## 2020-09-29 RX ADMIN — OXYCODONE HYDROCHLORIDE 30 MG: 15 TABLET ORAL at 14:54

## 2020-09-29 RX ADMIN — SODIUM CHLORIDE, PRESERVATIVE FREE 10 ML: 5 INJECTION INTRAVENOUS at 19:30

## 2020-09-29 RX ADMIN — SODIUM CHLORIDE, PRESERVATIVE FREE 10 ML: 5 INJECTION INTRAVENOUS at 12:06

## 2020-09-29 RX ADMIN — FAMOTIDINE 10 MG: 20 TABLET, FILM COATED ORAL at 09:26

## 2020-09-29 RX ADMIN — HYDROMORPHONE HYDROCHLORIDE 0.5 MG: 1 INJECTION, SOLUTION INTRAMUSCULAR; INTRAVENOUS; SUBCUTANEOUS at 12:05

## 2020-09-29 RX ADMIN — HYDROMORPHONE HYDROCHLORIDE 0.5 MG: 1 INJECTION, SOLUTION INTRAMUSCULAR; INTRAVENOUS; SUBCUTANEOUS at 06:12

## 2020-09-29 RX ADMIN — HYDROMORPHONE HYDROCHLORIDE 1 MG: 1 INJECTION, SOLUTION INTRAMUSCULAR; INTRAVENOUS; SUBCUTANEOUS at 22:29

## 2020-09-29 RX ADMIN — OXYCODONE 20 MG: 5 TABLET ORAL at 04:21

## 2020-09-29 RX ADMIN — HYDROMORPHONE HYDROCHLORIDE 0.5 MG: 1 INJECTION, SOLUTION INTRAMUSCULAR; INTRAVENOUS; SUBCUTANEOUS at 01:59

## 2020-09-29 RX ADMIN — FAMOTIDINE 10 MG: 20 TABLET, FILM COATED ORAL at 19:29

## 2020-09-29 RX ADMIN — OXYCODONE HYDROCHLORIDE 30 MG: 15 TABLET ORAL at 19:29

## 2020-09-29 RX ADMIN — SODIUM CHLORIDE 20 ML: 9 INJECTION, SOLUTION INTRAVENOUS at 13:03

## 2020-09-29 RX ADMIN — OXYCODONE HYDROCHLORIDE 30 MG: 15 TABLET ORAL at 23:39

## 2020-09-29 ASSESSMENT — PAIN SCALES - GENERAL
PAINLEVEL_OUTOF10: 10
PAINLEVEL_OUTOF10: 3
PAINLEVEL_OUTOF10: 3
PAINLEVEL_OUTOF10: 7
PAINLEVEL_OUTOF10: 10
PAINLEVEL_OUTOF10: 7
PAINLEVEL_OUTOF10: 10
PAINLEVEL_OUTOF10: 8
PAINLEVEL_OUTOF10: 7

## 2020-09-29 NOTE — PROGRESS NOTES
Neurovascularly intact without any focal sensory/motor deficits. Cranial nerves: II-XII intact, grossly non-focal.  Psychiatric: Alert and oriented, thought content appropriate, normal insight  Capillary Refill: Brisk,< 3 seconds   Peripheral Pulses: +2 palpable, equal bilaterally       Labs:   Recent Labs     09/28/20  0016 09/29/20  0612 09/29/20  0748   WBC 14.3* 8.6  --    HGB 7.4* 6.5* 7.1*   HCT 24.8* 21.7* 23.6*   * 486*  --      Recent Labs     09/28/20  0016 09/29/20  0612   * 136   K 3.0* 3.4*   CL 86* 96*   CO2 30 28   BUN 6* 8   CREATININE 1.0 0.9   CALCIUM 9.1 8.7     Recent Labs     09/28/20  0016 09/29/20  0612   AST 19 12*   ALT 12 9*   BILITOT 0.5 0.3   ALKPHOS 89 72     Recent Labs     09/28/20  0828   INR 1.35*     Recent Labs     09/28/20  1109 09/28/20  1756   TROPONINI <0.01 <0.01       Urinalysis:      Lab Results   Component Value Date    NITRU Negative 09/28/2020    WBCUA 0-2 09/08/2016    BACTERIA 1+ 08/18/2016    RBCUA 3-5 09/08/2016    BLOODU Negative 09/28/2020    SPECGRAV 1.015 09/28/2020    GLUCOSEU Negative 09/28/2020       Radiology:  XR CHEST PORTABLE   Final Result   Mild patchy bibasilar airspace disease which may represent pneumonia. CT ABDOMEN PELVIS WO CONTRAST Additional Contrast? Oral   Final Result   1. A cluster and loop of small bowel in the right upper quadrant adjacent to   the known colonic mass demonstrates tumor invasion into the small bowel wall   with at least 1 and possibly 2 enterocolonic fistulas. 2. Increased size of a cystic appearing lesion along the greater curvature of   the stomach which measures up to 5.3 cm. This is of unclear clinical   significance but does not have an appearance similar to the tumor deposits   elsewhere. Pancreatic pseudocysts could be considered. There is mild   irregularity with small stranding about the tail of the pancreas. Correlate   with lipase levels.    3. Unchanged 6 mm right middle lobe pulmonary nodule. 4. Periaortic, retrocrural and retroperitoneal lymphadenopathy, some of which   is increased since the comparison study. 5. Small reactive mesenteric stranding. 6. Slightly increased size of an anterior subdiaphragmatic tumor deposit. Overall stable right posterior subdiaphragmatic tumor deposits with possible   extension into the liver versus external compression. Assessment/Plan:    Active Hospital Problems    Diagnosis    Intractable abdominal pain [R10.9]     Abdominal pain with nausea vomiting:  Patient was noted to have 2 enterocolonic fistulas in addition to a increasing cystic appearing lesion in the greater curvature of the stomach around 5.3 cm unclear if this is tumor or abscess. Surgery and IR consulted. Input noted, no plan for surgery. Symptomatic management, palliative care consult.     Sepsis secondary to suspected abdominal abscess:  Blood cultures obtained. Chest x-ray and UA pending  IV Levaquin and metronidazole. Clinically improving.     Stage IV colon cancer:  Patient was told multiple times that he has been optimized to what can be offered however patient would like to remain full code and explore other options.   Oncology consultation, palliative care consultation     Hypokalemia: Replaced     Hypovolemic hyponatremia: IVF given in the ED, continue to monitor     Microcytic anemia: Iron panel ordered,    DVT Prophylaxis: Lovenox  Diet: DIET CLEAR LIQUID;  Code Status: Full Code    PT/OT Eval Status: Not ordered    Dispo -1 to 2 days    Yen Valle MD

## 2020-09-29 NOTE — CARE COORDINATION
CASE MANAGEMENT INITIAL ASSESSMENT      Reviewed chart and completed assessment with: Pt at bedside. Explained Case Management role/services. Yes    Primary contact information: Han Calle [phone]. Health Care Decision Maker:  Who do you trust or have selected to make healthcare decisions for you? Name:   Kya  Phone  Number: [phone]  Can this person be reached and be able to respond quickly, such as within a few minutes or hours? Yes  Who would be your back-up decision maker? Name  Daughter Sheree Roberts, [phone]. Admit date/status:IP 9/28/20  Diagnosis:Abdominal Pain       Insurance: Medicaid   Precert required for SNF: Yes       3 night stay required: No    Living arrangements, Adls, care needs, prior to admission: Lives in a second floor apartment with [address]. Independent in ADL's . Girlfrienkassie Boland has her own home, but does help a lot. Also daughter Phalen 16 y.o. helps as needed. Also neighbor helps. Transportation: family friends. Services in the home and/or outpatient, prior to admission: None  PT/OT recs: Home assist PRN    Barriers to discharge: None    Plan/comments: CM met with pt at bedside for initial assessment. Plans to return home and denies DCP needs. CM notes Palliative Care following spoke to Autumn MEDINA/RN and updated that she reached out to Raeann Calle and awaiting a call back.  CM following-Lisy Bruce RN      ECOC on chart for MD signature

## 2020-09-29 NOTE — PROGRESS NOTES
Pt assessment completed and charted. VSS. Pt a/o. PRN medication administered per [facility] for pain control. Bed in lowest position and wheels locked. Call light within reach. Bedside table within reach. Non-skid footwear in place. Pt denies any other needs at this time. Pt calls out appropriately. Will continue to monitor.

## 2020-09-29 NOTE — PLAN OF CARE
Management                                                                                                                      Rambo Contreras RN

## 2020-09-29 NOTE — ONCOLOGY
3.4*   CL 86* 96*   CO2 30 28   BUN 6* 8   CREATININE 1.0 0.9     Recent Labs     09/28/20  0016 09/29/20  0612   AST 19 12*   ALT 12 9*   BILITOT 0.5 0.3   ALKPHOS 89 72       Magnesium:    Lab Results   Component Value Date    MG 2.20 05/24/2020    MG 2.50 05/23/2020    MG 2.00 05/13/2019         Problem List  Patient Active Problem List   Diagnosis    BANDEMIA    LFT elevation    Smoker    Alcohol consumption heavy    Bilateral pneumonia    Acute respiratory failure (HCC)    Pneumonia    Intractable abdominal pain    Colitis    Ileus ([address]    Alcohol dependence ([address]    Alcohol withdrawal ([address]    Tobacco abuse    Postoperative wound seroma    Encounter for long-term (current) use of high-risk medication    Cancer of right colon ([address]    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[address]    Encounter for smoking cessation counseling    Incisional hernia without obstruction or gangrene    Postoperative intra-abdominal abscess    Abdominal abscess    Abscess of abdominal wall    Sepsis ([address]    Postoperative abscess    VRE (vancomycin-resistant Enterococci) infection    Delirium due to another medical condition    Adjustment disorder with anxious mood    Epigastric pain    Acute pancreatitis    Leukocytosis    Suprapubic pain    Right lower quadrant abdominal pain    Pain management    Chronic alcoholic pancreatitis (HCC)    Bowel obstruction (HCC)    Recurrent incisional hernia    Obesity    Hypertension    Hyperlipidemia    GERD (gastroesophageal reflux disease)    Thyroid disease       ASSESSMENT AND PLAN    Stage IV Colon CA  - Unfortunate situation, seen at  recently without any standard of care treatment options available. - CT on 9/28 showed progression of disease.  - Agree with palliative care consult.     Anemia with thrombocytosis  - iron labs pending, replace PRN. - transfuse to keep Hgb over 7- now 6.5. Will transfuse. Pain  - Will resume Oxycodone 30 mg PRN.   This had been working for him. ONCOLOGIC DISPOSITION:  Hospice.     Chiquita Smith MD  Please contact through [facility]

## 2020-09-29 NOTE — PROGRESS NOTES
Discussed with Hospitalist team.  Pt moving to Palliative Care approach, Hospice eval pending. No surgical intervention at this time. Will sign off. Please call w/ further questions.     DTW

## 2020-09-29 NOTE — PROGRESS NOTES
Sent [employer] regarding critical lab value:    [address] or Facility: [facility] From: Julianne Chapa RE: Yelena Gupta 1980 RM: 5 Lab called with panic value for Hemoglobin - 6.5. Please review and provide orders as needed. Thank you~ Need Callback: NO CALLBACK REQ C5 MED SURG / ORTHO URGENT  Unread      Response:   HH repeat stat. Is pt typed and screened if not please add      Orders entered.

## 2020-09-29 NOTE — PROGRESS NOTES
Pt tolerated blood transfusion well. VSS, BP remains soft. Lungs clear. Pt denies any other symptoms. Will continue to monitor.

## 2020-09-29 NOTE — PROGRESS NOTES
PROGRESS NOTE  S:40 yrs Patient  admitted on 9/28/2020 with Abdominal pain [R10.9]  Abdominal pain [R10.9] . Patient still with pain. Family met with hospice. Patient considerin care options. Current Hospital Schedued Meds   levofloxacin  750 mg Intravenous Q24H    sodium chloride flush  10 mL Intravenous 2 times per day    famotidine  10 mg Oral BID     Current Hospital IV Meds    Current Hospital PRN Meds  oxyCODONE, HYDROmorphone, sodium chloride flush, potassium chloride **OR** potassium alternative oral replacement **OR** potassium chloride, magnesium sulfate, acetaminophen **OR** acetaminophen, bisacodyl, sodium phosphate, promethazine **OR** ondansetron    Exam:   Vitals:    09/29/20 1926   BP: 93/62   Pulse: 95   Resp: 16   Temp: 98.5 °F (36.9 °C)   SpO2: 98%     I/O last 3 completed shifts:   In: 5 [P.O.:720]  Out: -    General appearance: alert, appears stated age and cooperative  HEENT: PERRLA  Neck: no adenopathy, no carotid bruit, no JVD, supple, symmetrical, trachea midline and thyroid not enlarged, symmetric, no tenderness/mass/nodules  Lungs: clear to auscultation bilaterally  Heart: regular rate and rhythm, S1, S2 normal, no murmur, click, rub or gallop  Abdomen: soft, non-tender; bowel sounds normal; no masses,  no organomegaly  Extremities: extremities normal, atraumatic, no cyanosis or edema     Labs:  CBC:   Recent Labs     09/28/20 0016 09/29/20 0612 09/29/20  0748   WBC 14.3* 8.6  --    HGB 7.4* 6.5* 7.1*   HCT 24.8* 21.7* 23.6*   MCV 65.2* 65.5*  --    * 486*  --      BMP:   Recent Labs     09/28/20 0016 09/29/20 0612   * 136   K 3.0* 3.4*   CL 86* 96*   CO2 30 28   BUN 6* 8   CREATININE 1.0 0.9     LIVER PROFILE:   Recent Labs     09/28/20 0016 09/29/20  0612   AST 19 12*   ALT 12 9*   LIPASE 46.0  --    PROT 8.1 7.1   BILITOT 0.5 0.3   ALKPHOS 89 72     PT/INR:   Recent Labs     09/28/20  0828   INR 1.35*       IMAGING:  Ct Abdomen Pelvis Wo Contrast Additional Contrast? Oral    Result Date: 9/28/2020  EXAMINATION: CT OF THE ABDOMEN AND PELVIS WITHOUT CONTRAST 9/28/2020 1:50 am TECHNIQUE: CT of the abdomen and pelvis was performed without the administration of intravenous contrast. Multiplanar reformatted images are provided for review. Dose modulation, iterative reconstruction, and/or weight based adjustment of the mA/kV was utilized to reduce the radiation dose to as low as reasonably achievable. COMPARISON: 05/22/2020 HISTORY: ORDERING SYSTEM PROVIDED HISTORY: Worsening ab pain, h/o colon cancer TECHNOLOGIST PROVIDED HISTORY: Reason for exam:->Worsening ab pain, h/o colon cancer Additional Contrast?->Oral Reason for Exam: Generalized abd pain and loss of appetite; Hx stage 4 colon ca in 2015 Acuity: Acute Type of Exam: Initial Relevant Medical/Surgical History: Hx coletomy, hernia repair, pancreatitis FINDINGS: Lower Chest: No acute process in the lung bases. Unchanged 6 mm right middle lobe pulmonary nodule. Unchanged partially calcified periaortic lymph nodes in the lower chest and at the hiatus. Organs: The solid organs are incompletely evaluated without intravenous contrast.  As before, there are nodular masses along the intraperitoneal portion of the right hemidiaphragm, likely with external compression into hepatic segment 7. Invasion is not definitely excluded. There is external compression of the pancreatic tail related to a cystic lesion in the gastric wall. Mild irregularity of the distal pancreatic duct appears similar to the comparison study. GI/Bowel: There is a 3.7 x 5.3 x 4.4 cm cystic lesion between the stomach and pancreatic tail which is favored to originate from the gastric wall. A 1.4 cm low-attenuation focus was seen in this region on the comparison study. There is a large infiltrative mass at the hepatic flexure.   No extraluminal contrast leakage, however, there do appear to be 1 and possibly 2 tumor deposits with possible extension into the liver versus external compression. Xr Chest Portable    Result Date: 9/28/2020  EXAMINATION: ONE XRAY VIEW OF THE CHEST 9/28/2020 4:42 am COMPARISON: 05/24/2019 HISTORY: ORDERING SYSTEM PROVIDED HISTORY: Sepsis TECHNOLOGIST PROVIDED HISTORY: Reason for exam:->Sepsis Reason for Exam: Sepsis Acuity: Acute Type of Exam: Initial FINDINGS: There is an implanted left-sided chest port. Cardiac mediastinal silhouettes appear overall stable. No cardiac enlargement is identified. Mild patchy airspace disease seen at the lung bases. No pneumothorax is identified. No acute osseous abnormality is identified. Mild patchy bibasilar airspace disease which may represent pneumonia. Hospital Problems           Last Modified POA    Intractable abdominal pain 9/28/2020 Yes         Impression:  40 yo male with stage 4 colon cancer c/b enterocolonic fistulas with nausea, vomiting, abdominal pain and large cystic lesion seen adjacent to the stomach. Recommendation:  1. Moving towards palliative care approach. Will sign off. Please call if can be of further service.         Georgeanne Leventhal, DO  7:38 PM 9/29/2020            [facility]    [address]      [address] [place] [address]     Phone: [phone]     Fax: [phone]

## 2020-09-29 NOTE — CONSULTS
Palliative Care Initial Note  Palliative Care Admit date:  9/29/2020    Advance Directives:   Reviewed the HCPOA in this EMR and pt designated Kay Sanford, as his healthcare proxy. Pt currently has a full code status. Plan of care/goals:  Upon arrival, pt lying quietly on back. Once aroused, pt almost unable to sit or lie still from \"all this (expletive) pain. \"   Pt stated he know's \"I'm at the end but they've been telling me I'm dying for five years, now the only thing I want is to get rid of all this pain. \"   Reviewed the Onc notes here and from  and pt aware that he does not have 'treatment options' and surgery would be 'extensive and onerous and cause more problems than the disease' per  Onc. A referral had recently been made to palliative medicine @  for sx control but pt hasn't yet seen. Discussion w/ Faith Cyr at the present time was difficult though he said he was agreeable to hospice, \"anything to get me out of this (expletive) pain! \"   Quite honestly, Faith Cyr wasn't in his best frame of mind to have an complete or appropriate GOC discussion until his pain is better so writer will plan to return. He did give writer permission to f/u w/ Micah Hamilton. F/u w/ rafael RN who was en route to give prn hydromorphone, pt had rec'd prn oxycodone earlier    Social/Spiritual:    Plan:  Call to Micah Hamilton who was unable to take call right then, said she would call writer back.         Reason for consult:    _X_ Advance Care Planning  ___ Transition of Care Planning  ___ Psychosocial/Spiritual Support  ___ Symptom Management                                                                                                                Kasey Talavera, RN

## 2020-09-29 NOTE — PLAN OF CARE
Problem: Falls - Risk of:  Goal: Absence of physical injury  Description: Absence of physical injury  9/29/2020 1312 by Howard Cano RN  Outcome: Met This Shift  9/29/2020 0021 by Wendy Nguyen RN  Outcome: Ongoing     Problem: Pain:  Goal: Pain level will decrease  Description: Pain level will decrease  9/29/2020 1312 by Howard Cano RN  Outcome: Ongoing  9/29/2020 0021 by Wendy Nguyen RN  Outcome: Ongoing

## 2020-09-30 PROCEDURE — 6360000002 HC RX W HCPCS: Performed by: INTERNAL MEDICINE

## 2020-09-30 PROCEDURE — 6370000000 HC RX 637 (ALT 250 FOR IP): Performed by: INTERNAL MEDICINE

## 2020-09-30 PROCEDURE — G0378 HOSPITAL OBSERVATION PER HR: HCPCS

## 2020-09-30 PROCEDURE — 96366 THER/PROPH/DIAG IV INF ADDON: CPT

## 2020-09-30 PROCEDURE — 2580000003 HC RX 258: Performed by: INTERNAL MEDICINE

## 2020-09-30 PROCEDURE — 96376 TX/PRO/DX INJ SAME DRUG ADON: CPT

## 2020-09-30 RX ADMIN — HYDROMORPHONE HYDROCHLORIDE 1 MG: 1 INJECTION, SOLUTION INTRAMUSCULAR; INTRAVENOUS; SUBCUTANEOUS at 02:32

## 2020-09-30 RX ADMIN — OXYCODONE HYDROCHLORIDE 30 MG: 15 TABLET ORAL at 08:39

## 2020-09-30 RX ADMIN — HYDROMORPHONE HYDROCHLORIDE 1 MG: 1 INJECTION, SOLUTION INTRAMUSCULAR; INTRAVENOUS; SUBCUTANEOUS at 06:50

## 2020-09-30 RX ADMIN — OXYCODONE HYDROCHLORIDE 30 MG: 15 TABLET ORAL at 12:48

## 2020-09-30 RX ADMIN — OXYCODONE HYDROCHLORIDE 30 MG: 15 TABLET ORAL at 21:01

## 2020-09-30 RX ADMIN — OXYCODONE HYDROCHLORIDE 30 MG: 15 TABLET ORAL at 16:43

## 2020-09-30 RX ADMIN — FAMOTIDINE 10 MG: 20 TABLET, FILM COATED ORAL at 08:39

## 2020-09-30 RX ADMIN — FAMOTIDINE 10 MG: 20 TABLET, FILM COATED ORAL at 21:01

## 2020-09-30 RX ADMIN — OXYCODONE HYDROCHLORIDE 30 MG: 15 TABLET ORAL at 04:13

## 2020-09-30 RX ADMIN — SODIUM CHLORIDE, PRESERVATIVE FREE 10 ML: 5 INJECTION INTRAVENOUS at 21:01

## 2020-09-30 RX ADMIN — SODIUM CHLORIDE, PRESERVATIVE FREE 10 ML: 5 INJECTION INTRAVENOUS at 09:52

## 2020-09-30 RX ADMIN — LEVOFLOXACIN 750 MG: 750 INJECTION, SOLUTION INTRAVENOUS at 05:42

## 2020-09-30 ASSESSMENT — PAIN SCALES - GENERAL
PAINLEVEL_OUTOF10: 8
PAINLEVEL_OUTOF10: 8
PAINLEVEL_OUTOF10: 7
PAINLEVEL_OUTOF10: 8
PAINLEVEL_OUTOF10: 8
PAINLEVEL_OUTOF10: 10
PAINLEVEL_OUTOF10: 9
PAINLEVEL_OUTOF10: 7

## 2020-09-30 ASSESSMENT — PAIN DESCRIPTION - LOCATION: LOCATION: ABDOMEN

## 2020-09-30 ASSESSMENT — PAIN DESCRIPTION - PAIN TYPE: TYPE: CHRONIC PAIN

## 2020-09-30 ASSESSMENT — PAIN DESCRIPTION - ORIENTATION: ORIENTATION: MID;RIGHT;LEFT

## 2020-09-30 ASSESSMENT — PAIN DESCRIPTION - FREQUENCY: FREQUENCY: CONTINUOUS

## 2020-09-30 NOTE — PROGRESS NOTES
Palliative Care Progress Note  Palliative Care Admit date:  9/29/2020    Plan:  [facility] has made contact w/ Kya and they are in process of determining if Kya can meet this evening vs tomorrow.        Reason for consult:    ___ Advance Care Planning  ___ Transition of Care Planning  ___ Psychosocial/Spiritual Support  ___ Symptom Management                                                                                                                              Tomás Calvillo RN

## 2020-09-30 NOTE — PROGRESS NOTES
sensory/motor deficits. Cranial nerves: II-XII intact, grossly non-focal.  Psychiatric: Alert and oriented, thought content appropriate, normal insight  Capillary Refill: Brisk,< 3 seconds   Peripheral Pulses: +2 palpable, equal bilaterally       Labs:   Recent Labs     09/28/20  0016 09/29/20  0612 09/29/20  0748   WBC 14.3* 8.6  --    HGB 7.4* 6.5* 7.1*   HCT 24.8* 21.7* 23.6*   * 486*  --      Recent Labs     09/28/20  0016 09/29/20  0612   * 136   K 3.0* 3.4*   CL 86* 96*   CO2 30 28   BUN 6* 8   CREATININE 1.0 0.9   CALCIUM 9.1 8.7     Recent Labs     09/28/20  0016 09/29/20  0612   AST 19 12*   ALT 12 9*   BILITOT 0.5 0.3   ALKPHOS 89 72     Recent Labs     09/28/20  0828   INR 1.35*     Recent Labs     09/28/20  1109 09/28/20  1756   TROPONINI <0.01 <0.01       Urinalysis:      Lab Results   Component Value Date    NITRU Negative 09/28/2020    WBCUA 0-2 09/08/2016    BACTERIA 1+ 08/18/2016    RBCUA 3-5 09/08/2016    BLOODU Negative 09/28/2020    SPECGRAV 1.015 09/28/2020    GLUCOSEU Negative 09/28/2020       Radiology:  XR CHEST PORTABLE   Final Result   Mild patchy bibasilar airspace disease which may represent pneumonia. CT ABDOMEN PELVIS WO CONTRAST Additional Contrast? Oral   Final Result   1. A cluster and loop of small bowel in the right upper quadrant adjacent to   the known colonic mass demonstrates tumor invasion into the small bowel wall   with at least 1 and possibly 2 enterocolonic fistulas. 2. Increased size of a cystic appearing lesion along the greater curvature of   the stomach which measures up to 5.3 cm. This is of unclear clinical   significance but does not have an appearance similar to the tumor deposits   elsewhere. Pancreatic pseudocysts could be considered. There is mild   irregularity with small stranding about the tail of the pancreas. Correlate   with lipase levels. 3. Unchanged 6 mm right middle lobe pulmonary nodule.    4. Periaortic, retrocrural and retroperitoneal lymphadenopathy, some of which   is increased since the comparison study. 5. Small reactive mesenteric stranding. 6. Slightly increased size of an anterior subdiaphragmatic tumor deposit. Overall stable right posterior subdiaphragmatic tumor deposits with possible   extension into the liver versus external compression. Assessment/Plan:    Active Hospital Problems    Diagnosis    Intractable abdominal pain [R10.9]     Abdominal pain with nausea vomiting:  Patient was noted to have 2 enterocolonic fistulas in addition to a increasing cystic appearing lesion in the greater curvature of the stomach around 5.3 cm unclear if this is tumor or abscess. Surgery and IR consulted. Input noted, no plan for surgery. Symptomatic management, palliative care consult.     Sepsis secondary to suspected abdominal abscess:  Blood cultures obtained. Chest x-ray and UA pending  IV Levaquin and metronidazole. Clinically improving.     Stage IV colon cancer:  Patient was told multiple times that he has been optimized to what can be offered however patient would like to remain full code and explore other options. Oncology consultation, palliative care consultation     Hypokalemia: Replaced     Hypovolemic hyponatremia: IVF given in the ED, continue to monitor     Palliative care consult, possible Hospice.     DVT Prophylaxis: Lovenox  Diet: DIET CLEAR LIQUID;  Dietary Nutrition Supplements: Clear Liquid Oral Supplement  Code Status: Full Code    PT/OT Eval Status: Not ordered    Jose Sandhu MD

## 2020-09-30 NOTE — PLAN OF CARE
Problem: Falls - Risk of:  Goal: Will remain free from falls  Description: Will remain free from falls  Outcome: Ongoing  Goal: Absence of physical injury  Description: Absence of physical injury  9/29/2020 2159 by Eulogio Mosquera RN  Outcome: Ongoing       Problem: Pain:  Goal: Pain level will decrease  Description: Pain level will decrease  9/29/2020 2159 by Eulogio Mosquera RN  Outcome: Ongoing    Goal: Control of acute pain  Description: Control of acute pain  Outcome: Ongoing  Goal: Control of chronic pain  Description: Control of chronic pain  Outcome: Ongoing

## 2020-09-30 NOTE — PLAN OF CARE
Palliative Care Progress Note  Palliative Care Admit date:  9/29/2020    Advance Directives:  Again today, Pauline Ascencio readily expressed his understanding \"I am terminal, there is nothing else to give me so I'm going to die. \"   Writer advanced that discussion to better understand pts wishes re: resuscitation; would he desire cpr, would he desire MV support? Pt answered Eran Lucas would I do that, when I'm gone, I'm gone. \"   He affirmed to writer that he is ready to amend code status to reflect DNR/DNI. Plan of care/goals: Although Pauline Ascencio cont'd to c/o pain today, he was better able to engage in meaningful discussion. His primary expressed wish is \"keep me comfortable while I'm dying. \"  He verb'd frustration Jeanette Poe keep telling me 'oh we'll make sure you're comfortable, don't have pain' but so far, no one has done that. \"    We were able to talk about the benefit of involving hospice and has acknowledged understanding that the focus will be on comfort in his terminal state and not to continue coming in and out of the hospital.      With pts permission again today, writer had f/u d/w Kya Franco) and she is pleased pt will agree to hospice. Pt/family made aware of hospice provider options. Neither had a preference, however, Kya did say that she preferred a hospice w/ an IPU and one close by; ref called to [facility] in light of the pt/family proximity to the [facility]. Social/Spiritual:  Pt lives alone and is eager to get home. In light of pt pain, have d/w Kya the potential benefit of the IPU to better improve pt pain before actually going home. Plan:  HOC will f/u w/ Kya to arrange time for family mtg. [facility] to better reflect pts wish for comfort measures in his \"terminal state. \"        Reason for consult:    _X_ Advance Care Planning  _X_ Transition of Care Planning  ___ Psychosocial/Spiritual Support  ___ Symptom Management                                                                                                                                              Josie Hay, RN

## 2020-09-30 NOTE — PROGRESS NOTES
Amador Calero, from lab, called RN this morning. Stated patient is refusing to have labs drawn today. RN will continue to monitor.

## 2020-10-01 VITALS
WEIGHT: 202.3 LBS | OXYGEN SATURATION: 97 % | TEMPERATURE: 98.4 F | BODY MASS INDEX: 28.96 KG/M2 | RESPIRATION RATE: 16 BRPM | HEART RATE: 107 BPM | HEIGHT: 70 IN | SYSTOLIC BLOOD PRESSURE: 109 MMHG | DIASTOLIC BLOOD PRESSURE: 70 MMHG

## 2020-10-01 PROBLEM — R10.9 ABDOMINAL PAIN: Status: ACTIVE | Noted: 2020-10-01

## 2020-10-01 PROCEDURE — 6360000002 HC RX W HCPCS: Performed by: INTERNAL MEDICINE

## 2020-10-01 PROCEDURE — G0378 HOSPITAL OBSERVATION PER HR: HCPCS

## 2020-10-01 PROCEDURE — 6370000000 HC RX 637 (ALT 250 FOR IP): Performed by: INTERNAL MEDICINE

## 2020-10-01 PROCEDURE — 1200000000 HC SEMI PRIVATE

## 2020-10-01 PROCEDURE — 96376 TX/PRO/DX INJ SAME DRUG ADON: CPT

## 2020-10-01 PROCEDURE — 96366 THER/PROPH/DIAG IV INF ADDON: CPT

## 2020-10-01 RX ADMIN — LEVOFLOXACIN 750 MG: 750 INJECTION, SOLUTION INTRAVENOUS at 05:37

## 2020-10-01 RX ADMIN — OXYCODONE HYDROCHLORIDE 30 MG: 15 TABLET ORAL at 10:19

## 2020-10-01 RX ADMIN — BISACODYL 5 MG: 5 TABLET, COATED ORAL at 08:33

## 2020-10-01 RX ADMIN — HYDROMORPHONE HYDROCHLORIDE 1 MG: 1 INJECTION, SOLUTION INTRAMUSCULAR; INTRAVENOUS; SUBCUTANEOUS at 13:37

## 2020-10-01 RX ADMIN — OXYCODONE HYDROCHLORIDE 30 MG: 15 TABLET ORAL at 05:41

## 2020-10-01 RX ADMIN — OXYCODONE HYDROCHLORIDE 30 MG: 15 TABLET ORAL at 01:19

## 2020-10-01 RX ADMIN — FAMOTIDINE 10 MG: 20 TABLET, FILM COATED ORAL at 08:33

## 2020-10-01 RX ADMIN — OXYCODONE HYDROCHLORIDE 30 MG: 15 TABLET ORAL at 15:03

## 2020-10-01 ASSESSMENT — PAIN SCALES - GENERAL
PAINLEVEL_OUTOF10: 9
PAINLEVEL_OUTOF10: 8
PAINLEVEL_OUTOF10: 8
PAINLEVEL_OUTOF10: 9
PAINLEVEL_OUTOF10: 8

## 2020-10-01 ASSESSMENT — PAIN DESCRIPTION - PAIN TYPE
TYPE: ACUTE PAIN
TYPE: ACUTE PAIN

## 2020-10-01 ASSESSMENT — PAIN DESCRIPTION - LOCATION
LOCATION: ABDOMEN
LOCATION: ABDOMEN

## 2020-10-01 NOTE — DISCHARGE INSTR - COC
Continuity of Care Form    Patient Name: Karine Hernandez   :  1980  MRN:  [id number]    Admit date:  2020  Discharge date:  ***    Code Status Order: [facility]   Advance Directives:   [facility] Documentation     Date/Time Healthcare Directive Type of Healthcare Directive Copy in [address] Agent's Name Healthcare Agent's Phone Number    [phone]  Yes, patient has an advance directive for healthcare treatment  Durable power of  for health care; Health care treatment directive; Living will  Yes, copy in [address] ()  --          Admitting Physician:  Zia Valenzuela DO  PCP: No primary care provider on file. Discharging Nurse: [facility] Unit/Room#: 4419/7457-74  Discharging Unit Phone Number: ***    Emergency Contact:   Extended Emergency Contact Information  Primary Emergency Contact: Nicolasa Kebede, [address] [facility] Phone: [phone]  Mobile Phone: [phone]  Relation: Spouse  Secondary Emergency Contact: [address] Phone: [phone]  Mobile Phone: [phone]  Relation: Child    Past Surgical History:  Past Surgical History:   Procedure Laterality Date    COLON SURGERY  2015    COLONOSCOPY      bx polyps    HERNIA REPAIR      HERNIA REPAIR N/A 2019    DAVINCI INCISIONAL HERNIA  REPAIR  OF RECURRENT INCISIONAL HERNIA WITH JANI BLOCK FOR PAIN CONTROL performed by Glenetta Cockayne, MD at [address] [place] [address] Right 2018    OTHER SURGICAL HISTORY  2015    lap converted to open right colectomy    OTHER SURGICAL HISTORY Left 2015    mediport    OTHER SURGICAL HISTORY  2016    hernia repair and bowel resection       Immunization History: There is no immunization history on file for this patient.     Active Problems:  Patient Active Problem List   Diagnosis Code    BANDEMIA     LFT elevation R79.89    Smoker F17.200    Alcohol consumption heavy Z78.9    Bilateral pneumonia J18.9    Acute respiratory failure (HCC) J96.00    Pneumonia J18.9    Intractable abdominal pain R10.9    Colitis K52.9    Ileus (HCC) K56.7    Alcohol dependence (HCC) F10.20    Alcohol withdrawal (HCC) F10.239    Tobacco abuse Z72.0    Postoperative wound seroma [id number]    Encounter for long-term (current) use of high-risk medication Z79.899    Cancer of right colon ([facility] C18.2    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    Delirium due to another medical condition F05    Adjustment disorder with anxious mood F43.22    Epigastric pain R10.13    Acute pancreatitis K85.90    Leukocytosis D72.829    Suprapubic pain R10.2    Right lower quadrant abdominal pain R10.31    Pain management R52    Chronic alcoholic pancreatitis (HCC) K86.0    Bowel obstruction (HCC) K56.609    Recurrent incisional hernia K43.2    Obesity E66.9    Hypertension I10    Hyperlipidemia E78.5    GERD (gastroesophageal reflux disease) K21.9    Thyroid disease E07.9    Abdominal pain R10.9       Isolation/Infection:   Isolation          No Isolation        Unreconciled External Infections     Infection Onset Last Indicated Last Received Source    No mapped external infections found    .     Unmapped Infections (1)      Rule Out COVID-19 05/18/20 05/18/20 09/27/20             Patient Infection Status     Infection Onset Added Last Indicated Last Indicated By Review Planned Expiration Resolved Resolved By    None active    Resolved    VRE  07/18/16 07/18/16 Jarvis Hendricks RN   05/29/18 Tung Tovar Teresa Blood RN          Nurse Assessment:  Last Vital Signs: /70   Pulse 107   Temp 98.4 °F (36.9 °C) (Oral)   Resp 16   Ht 5' 10\" (1.778 m)   Wt 202 lb 4.8 oz (91.8 kg)   SpO2 97%   BMI 29.03 kg/m²     Last documented pain score (0-10 scale): Pain Level: 8  Last Weight:   Wt Readings from Last 1 Encounters:   10/01/20 202 lb 4.8 oz (91.8 kg)     Mental Status:  oriented and alert    IV Access:  - None    Nursing Mobility/ADLs:  Walking   Independent  Transfer  Independent  Bathing  Independent  Dressing  Independent  [address]  Independent  Med Delivery   whole    Wound Care Documentation and Therapy:        Elimination:  Continence:   · Bowel: Yes  · Bladder: Yes  Urinary Catheter: None   Colostomy/Ileostomy/Ileal Conduit: No       Date of Last BM: 2020    Intake/Output Summary (Last 24 hours) at 10/1/2020 1454  Last data filed at 10/1/2020 0738  Gross per 24 hour   Intake 270 ml   Output --   Net 270 ml     I/O last 3 completed shifts: In: 120 [P.O.:120]  Out: -     Safety Concerns:     None    Impairments/Disabilities:      None    Nutrition Therapy:  Current Nutrition Therapy:   - Oral Diet:  Clear Liquid    Routes of Feeding: Oral  Liquids: No Restrictions  Daily Fluid Restriction: no  Last Modified Barium Swallow with Video (Video Swallowing Test): not done    Treatments at the Time of Hospital Discharge:   Respiratory Treatments: ***  Oxygen Therapy:  is not on home oxygen therapy.   Ventilator:    - No ventilator support    Rehab Therapies: {THERAPEUTIC INTERVENTION:[id number]}  Weight Bearing Status/Restrictions: [address] [place] [address] Weight Bearin}  Other Medical Equipment (for information only, NOT a DME order):  {EQUIPMENT:[id number]}  Other Treatments: ***    Patient's personal belongings (please select all that are sent with patient):  {[facility] DME Belongings:[id number]}    RN SIGNATURE:  Electronically signed by Francisco Morgan RN on 10/1/20 at 2:58

## 2020-10-01 NOTE — DISCHARGE SUMMARY
Hospital Medicine Discharge Summary    Patient ID: Jasson Ferreira      Patient's PCP: No primary care provider on file. Admit Date: 9/28/2020     Discharge Date:   10/1/2020    Admitting Physician: Coty Escoto DO     Discharge Physician: Carlos Allen MD     Discharge Diagnoses: Active Hospital Problems    Diagnosis    Abdominal pain [R10.9]    Intractable abdominal pain [R10.9]       The patient was seen and examined on day of discharge and this discharge summary is in conjunction with any daily progress note from day of discharge. 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 Surgery and IR consulted. Input noted, no plan for surgery. Symptomatic management, palliative care consult.     Sepsis secondary to suspected abdominal abscess:  Blood cultures obtained.  Chest x-ray and UA pending  IV Levaquin and metronidazole. Clinically improving.     Stage IV colon cancer:  Patient was told multiple times that he has been optimized to what can be offered however patient would like to remain full code and explore other options. Oncology consultation, palliative care consultation     Hypokalemia: Replaced     Hypovolemic hyponatremia: IVF given in the ED, continue to monitor     Palliative care consult, possible Hospice. Physical Exam Performed:     /70   Pulse 107   Temp 98.4 °F (36.9 °C) (Oral)   Resp 16   Ht 5' 10\" (1.778 m)   Wt 202 lb 4.8 oz (91.8 kg)   SpO2 97%   BMI 29.03 kg/m²       General appearance:  No apparent distress, appears stated age and cooperative. HEENT:  Normal cephalic, atraumatic without obvious deformity. Pupils equal, round, and reactive to light. Extra ocular muscles intact. Conjunctivae/corneas clear.   Neck: Supple, with full range of motion. No jugular venous distention. Trachea midline. Respiratory:  Normal respiratory effort. Clear to auscultation, bilaterally without Rales/Wheezes/Rhonchi. Cardiovascular:  Regular rate and rhythm with normal S1/S2 without murmurs, rubs or gallops. Abdomen: Soft, non-tender, non-distended with normal bowel sounds. Musculoskeletal:  No clubbing, cyanosis or edema bilaterally. Full range of motion without deformity. Skin: Skin color, texture, turgor normal.  No rashes or lesions. Neurologic:  Neurovascularly intact without any focal sensory/motor deficits. Cranial nerves: II-XII intact, grossly non-focal.  Psychiatric:  Alert and oriented, thought content appropriate, normal insight  Capillary Refill: Brisk,< 3 seconds   Peripheral Pulses: +2 palpable, equal bilaterally       Labs: For convenience and continuity at follow-up the following most recent labs are provided:      CBC:    Lab Results   Component Value Date    WBC 8.6 09/29/2020    HGB 7.1 09/29/2020    HCT 23.6 09/29/2020     09/29/2020       Renal:    Lab Results   Component Value Date     09/29/2020    K 3.4 09/29/2020    CL 96 09/29/2020    CO2 28 09/29/2020    BUN 8 09/29/2020    CREATININE 0.9 09/29/2020    CALCIUM 8.7 09/29/2020    PHOS 3.0 05/24/2020         Significant Diagnostic Studies    Radiology:   XR CHEST PORTABLE   Final Result   Mild patchy bibasilar airspace disease which may represent pneumonia. CT ABDOMEN PELVIS WO CONTRAST Additional Contrast? Oral   Final Result   1. A cluster and loop of small bowel in the right upper quadrant adjacent to   the known colonic mass demonstrates tumor invasion into the small bowel wall   with at least 1 and possibly 2 enterocolonic fistulas. 2. Increased size of a cystic appearing lesion along the greater curvature of   the stomach which measures up to 5.3 cm.   This is of unclear clinical   significance but does not have an appearance similar to the tumor deposits elsewhere. Pancreatic pseudocysts could be considered. There is mild   irregularity with small stranding about the tail of the pancreas. Correlate   with lipase levels. 3. Unchanged 6 mm right middle lobe pulmonary nodule. 4. Periaortic, retrocrural and retroperitoneal lymphadenopathy, some of which   is increased since the comparison study. 5. Small reactive mesenteric stranding. 6. Slightly increased size of an anterior subdiaphragmatic tumor deposit. Overall stable right posterior subdiaphragmatic tumor deposits with possible   extension into the liver versus external compression. Consults:     IP CONSULT TO HOSPITALIST  IP CONSULT TO ONCOLOGY  IP CONSULT TO GENERAL SURGERY  IP CONSULT TO PALLIATIVE CARE  IP CONSULT TO GI  IP CONSULT TO HOSPICE    Disposition:  Hospice     Condition at Discharge: Stable    Discharge Instructions/Follow-up:  Hospice    Code Status:  DNR-CC     Activity: activity as tolerated    Diet: regular diet      Discharge Medications:     Current Discharge Medication List           Details   oxyCODONE (ROXICODONE) 20 MG immediate release tablet Take 20 mg by mouth every 4 hours as needed. methadone (DOLOPHINE) 10 MG tablet Take 10 mg by mouth every 8 hours as needed. Time Spent on discharge is more than 30 minutes in the examination, evaluation, counseling and review of medications and discharge plan. Signed:    Brian Lugo MD   10/1/2020      Thank you No primary care provider on file. for the opportunity to be involved in this patient's care. If you have any questions or concerns please feel free to contact me at [phone].

## 2020-10-01 NOTE — PROGRESS NOTES
Hospitalist Progress Note      PCP: No primary care provider on file. Date of Admission: 9/28/2020    Chief Complaint: Abdominal pain, nausea and vomiting    Hospital Course: See H&P    Subjective:   Patient is up in bed, comfortable, not in distress. Denies any chest pain or shortness of breath. Complaining of abdominal pain and discomfort. No new event overnight noted. Medications:  Reviewed    Infusion Medications   Scheduled Medications    levofloxacin  750 mg Intravenous Q24H    sodium chloride flush  10 mL Intravenous 2 times per day    famotidine  10 mg Oral BID     PRN Meds: oxyCODONE, HYDROmorphone, sodium chloride flush, potassium chloride **OR** potassium alternative oral replacement **OR** potassium chloride, magnesium sulfate, acetaminophen **OR** acetaminophen, bisacodyl, sodium phosphate, promethazine **OR** ondansetron      Intake/Output Summary (Last 24 hours) at 10/1/2020 1042  Last data filed at 10/1/2020 0738  Gross per 24 hour   Intake 270 ml   Output --   Net 270 ml       Physical Exam Performed:    /70   Pulse 107   Temp 98.4 °F (36.9 °C) (Oral)   Resp 16   Ht 5' 10\" (1.778 m)   Wt 202 lb 4.8 oz (91.8 kg)   SpO2 97%   BMI 29.03 kg/m²     General appearance: No apparent distress, appears stated age and cooperative. HEENT: Pupils equal, round, and reactive to light. Conjunctivae/corneas clear. Neck: Supple, with full range of motion. No jugular venous distention. Trachea midline. Respiratory:  Normal respiratory effort. Clear to auscultation, bilaterally without Rales/Wheezes/Rhonchi. Cardiovascular: Regular rate and rhythm with normal S1/S2 without murmurs, rubs or gallops. Abdomen: Soft, non-tender, non-distended with normal bowel sounds. Musculoskeletal: No clubbing, cyanosis or edema bilaterally. Full range of motion without deformity. Skin: Skin color, texture, turgor normal.  No rashes or lesions.   Neurologic:  Neurovascularly intact without any focal sensory/motor deficits. Cranial nerves: II-XII intact, grossly non-focal.  Psychiatric: Alert and oriented, thought content appropriate, normal insight  Capillary Refill: Brisk,< 3 seconds   Peripheral Pulses: +2 palpable, equal bilaterally       Labs:   Recent Labs     09/29/20  0612 09/29/20  0748   WBC 8.6  --    HGB 6.5* 7.1*   HCT 21.7* 23.6*   *  --      Recent Labs     09/29/20  0612      K 3.4*   CL 96*   CO2 28   BUN 8   CREATININE 0.9   CALCIUM 8.7     Recent Labs     09/29/20  0612   AST 12*   ALT 9*   BILITOT 0.3   ALKPHOS 72     No results for input(s): INR in the last 72 hours. Recent Labs     09/28/20  1109 09/28/20  1756   TROPONINI <0.01 <0.01       Urinalysis:      Lab Results   Component Value Date    NITRU Negative 09/28/2020    WBCUA 0-2 09/08/2016    BACTERIA 1+ 08/18/2016    RBCUA 3-5 09/08/2016    BLOODU Negative 09/28/2020    SPECGRAV 1.015 09/28/2020    GLUCOSEU Negative 09/28/2020       Radiology:  XR CHEST PORTABLE   Final Result   Mild patchy bibasilar airspace disease which may represent pneumonia. CT ABDOMEN PELVIS WO CONTRAST Additional Contrast? Oral   Final Result   1. A cluster and loop of small bowel in the right upper quadrant adjacent to   the known colonic mass demonstrates tumor invasion into the small bowel wall   with at least 1 and possibly 2 enterocolonic fistulas. 2. Increased size of a cystic appearing lesion along the greater curvature of   the stomach which measures up to 5.3 cm. This is of unclear clinical   significance but does not have an appearance similar to the tumor deposits   elsewhere. Pancreatic pseudocysts could be considered. There is mild   irregularity with small stranding about the tail of the pancreas. Correlate   with lipase levels. 3. Unchanged 6 mm right middle lobe pulmonary nodule. 4. Periaortic, retrocrural and retroperitoneal lymphadenopathy, some of which   is increased since the comparison study.    5. Small reactive mesenteric stranding. 6. Slightly increased size of an anterior subdiaphragmatic tumor deposit. Overall stable right posterior subdiaphragmatic tumor deposits with possible   extension into the liver versus external compression. Assessment/Plan:    Active Hospital Problems    Diagnosis    Abdominal pain [R10.9]    Intractable abdominal pain [R10.9]     Abdominal pain with nausea vomiting:  Patient was noted to have 2 enterocolonic fistulas in addition to a increasing cystic appearing lesion in the greater curvature of the stomach around 5.3 cm unclear if this is tumor or abscess. Surgery and IR consulted. Input noted, no plan for surgery. Symptomatic management, palliative care consult.     Sepsis secondary to suspected abdominal abscess:  Blood cultures obtained. Chest x-ray and UA pending  IV Levaquin and metronidazole. Clinically improving.     Stage IV colon cancer:  Patient was told multiple times that he has been optimized to what can be offered however patient would like to remain full code and explore other options. Oncology consultation, palliative care consultation     Hypokalemia: Replaced     Hypovolemic hyponatremia: IVF given in the ED, continue to monitor     Palliative care consult, possible Hospice.     DVT Prophylaxis: Lovenox  Diet: DIET CLEAR LIQUID;  Dietary Nutrition Supplements: Clear Liquid Oral Supplement  Code Status: DNR-CC    PT/OT Eval Status: Not ordered    You Tinajero MD

## 2020-10-01 NOTE — CARE COORDINATION
Case Management Discharge Summary- Hospice Discharge    Destination:   [facility] location. Hospice Agency name and contact: [facility] [address]. Nikki Murphy [phone]. [facility] DNR signed and placed in discharge packet AND patient's hard chart. (This is required for DNR patients.)    Signed [facility]/AVS faxed to above agency/facility. Gave to [employer] per [employer]. Transport agency name and  time:  Crystal Fu with [address] asked Elvia Lesches RN to get approval from MD for pt to be transported to [facility] by [facility]. Patient's RN notified of plan: Spoke to Carmelina DESOUZA/RN and confirmed plan and she will reach out to pt's GF. Note:    Discharging nurse to complete nursing portion of ECOC, reconcile AVS, place final copy with patient's discharge packet. RN to ensure signed prescriptions are sent home with patient or the facility as per nursing protocol.

## 2020-10-02 LAB
BLOOD CULTURE, ROUTINE: NORMAL
CULTURE, BLOOD 2: NORMAL

## 2021-01-01 NOTE — PROGRESS NOTES
Pt alert and oriented. Assessment completed as charted. Pt c/o abdominal pain 8/10, scheduled medications given per MAR. Pt continuing to tolerate general diet. +BS and flatus. Abdominal binder in place. Pt resting in bed, denies further needs at present time. Call light and bedside table within reach. Bed locked and in lowest position. Will continue to monitor. 2021

## 2023-08-03 NOTE — PROGRESS NOTES
Comprehensive Nutrition Assessment    Type and Reason for Visit:  Initial, Positive Nutrition Screen    Nutrition Recommendations/Plan:   1. Clear liquids with Ensure clear supplements as tolerated  2. Monitor plan of care    Nutrition Assessment:  Patient admitted for intractable abd pain. HX of Stage 4 colon cancer. Out of treatment options per progress notes. In a lot of pain. Palliative care following, Hospice eval pending. Malnutrition Assessment:  Malnutrition Status: At risk for malnutrition (Comment)      Estimated Daily Nutrient Needs:  Energy (kcal):  6458-65353; Weight Used for Energy Requirements:  Ideal     Protein (g):  ; Weight Used for Protein Requirements:  Ideal(1.2-1.5)        Fluid (ml/day):   ; Weight Used for Fluid Requirements:         Nutrition Related Findings:  Patient resting quietly at time of visit, lunch tray was not touched sitting on sink      Wounds:  None       Current Nutrition Therapies:    DIET CLEAR LIQUID;  Dietary Nutrition Supplements: Clear Liquid Oral Supplement    Anthropometric Measures:  · Height: 5' 10\" (177.8 cm)  · Current Body Weight: 200 lb (90.7 kg)   · Ideal Body Weight: 166 lbs; % Ideal Body Weight     · BMI: 28.7  · BMI Categories: Overweight (BMI 25.0-29. 9)       Nutrition Diagnosis:   · Increased nutrient needs related to lack or limited access to food, increase demand for energy/nutrients, catabolic illness as evidenced by NPO or clear liquid status due to medical condition      Nutrition Interventions:   Food and/or Nutrient Delivery:  Continue Current Diet, Start Oral Nutrition Supplement  Nutrition Education/Counseling:  No recommendation at this time   Coordination of Nutrition Care:  Continued Inpatient Monitoring    Goals:  Patient will eat 50% or greater of meals and supplements.        Nutrition Monitoring and Evaluation:   Behavioral-Environmental Outcomes:      Food/Nutrient Intake Outcomes:  Food and Nutrient Intake, Supplement Intake  Physical Signs/Symptoms Outcomes:  Biochemical Data, Nutrition Focused Physical Findings     Discharge Planning:     Too soon to determine     Electronically signed by Darius Harrell, [address], LD on 9/29/20 at 3:14 PM EDT    Contact: Office: [phone]; [address] [place] [address] Road: [phone] Text: The lesion was extirpated to the level of the fat with a #15 scalpel blade.  Given the location of the defect, shape of the defect and the proximity to free margins a W-plasty was deemed most appropriate for repair.  Using a sterile surgical marker, the appropriate transposition arms of the W-plasty were drawn incorporating the defect and placing the expected incisions within the relaxed skin tension lines where possible.    The area thus outlined was incised deep to adipose tissue with a #15 scalpel blade.  The skin margins were undermined to an appropriate distance in all directions utilizing iris scissors.  The opposing transposition arms were then transposed into place in opposite direction and anchored with interrupted buried subcutaneous sutures.

## (undated) DEVICE — SPONGE LAP W18XL18IN WHT COT 4 PLY FLD STRUNG RADPQ DISP ST

## (undated) DEVICE — SOLUTION IV IRRIG WATER 1000ML POUR BRL 2F7114

## (undated) DEVICE — COLUMN DRAPE

## (undated) DEVICE — NEEDLE SPNL 22GA L3.5IN BLK HUB S STL REG WALL FIT STYL W/

## (undated) DEVICE — SUTURE VCRL + SZ 0 L27IN ABSRB VLT L26MM UR-6 5/8 CIR VCP603H

## (undated) DEVICE — SUTURE STRATAFIX SPRL MCRYL + SZ 3 0 L8IN ABSRB UD L26MM SH SXMP1B427

## (undated) DEVICE — STAPLER INT DIA5MM 25 ABSRB STRP FIX DISP FOR HERN MESH

## (undated) DEVICE — MEGA SUTURECUT ND: Brand: ENDOWRIST

## (undated) DEVICE — Device

## (undated) DEVICE — SET IV PMP 1 PRT CK VLV SPL SEPT PRTS 2 PC M LL 20 GTT LEN

## (undated) DEVICE — SUTURE VCRL + SZ 2-0 L18IN ABSRB UD CT1 L36MM 1/2 CIR VCP839D

## (undated) DEVICE — GLOVE,SURG,SENSICARE SLT,LF,PF,7.5: Brand: MEDLINE

## (undated) DEVICE — NEEDLE SPNL L3.5IN PNK HUB S STL REG WALL FIT STYL W/ QNCKE

## (undated) DEVICE — PMI DISPOSABLE PUNCTURE CLOSURE DEVICE / SUTURE GRASPER: Brand: PMI

## (undated) DEVICE — SUTURE STRATAFIX SPRL SZ 2 0 L5IN ABSRB VLT SH L26MM 1 2 CIR SXPD2B414

## (undated) DEVICE — SUTURE STRATAFIX SYMMETRIC PDS + SZ 0 L18IN ABSRB VLT CT 2 SXPP1A407

## (undated) DEVICE — CADIERE FORCEPS: Brand: ENDOWRIST

## (undated) DEVICE — PUMP SUC IRR TBNG L10FT W/ HNDPC ASSEMB STRYKEFLOW 2

## (undated) DEVICE — REDUCER: Brand: ENDOWRIST

## (undated) DEVICE — LARGE NEEDLE DRIVER: Brand: ENDOWRIST

## (undated) DEVICE — SCISSORS SURG DIA8MM MPLR CRV ENDOWRIST

## (undated) DEVICE — BINDER ABD H12IN FOR 30-45IN WAIST UNIV 4 PNL PREM DSGN E

## (undated) DEVICE — DRESSING TRNSPAR W5XL4.5IN FLM SHT SEMIPERMEABLE WIND

## (undated) DEVICE — PENCIL ES CRD L10FT HND SWCHING ROCK SWCH W/ EDGE COAT BLDE

## (undated) DEVICE — BLADELESS OBTURATOR: Brand: WECK VISTA

## (undated) DEVICE — SOLUTION IV 1000ML 0.9% SOD CHL PH 5 INJ USP VIAFLX PLAS

## (undated) DEVICE — BINDER ABD UNISX 9IN 62IN L AND XL UNIV

## (undated) DEVICE — SMARTGOWN BREATHABLE SURGICAL GOWN: Brand: CONVERTORS

## (undated) DEVICE — TOTAL TRAY, DB, 100% SILI FOLEY, 16FR 10: Brand: MEDLINE

## (undated) DEVICE — TUBING, SUCTION, 3/16" X 12', STRAIGHT: Brand: MEDLINE

## (undated) DEVICE — CANNULA SEAL

## (undated) DEVICE — SUTURE MCRYL SZ 4-0 L18IN ABSRB UD L19MM PS-2 3/8 CIR PRIM Y496G

## (undated) DEVICE — SUTURE PROL SZ 1 L30IN NONABSORBABLE BLU CTX L48MM 1/2 CIR 8455H

## (undated) DEVICE — SKIN AFFIX SURG ADHESIVE 72/CS 0.55ML: Brand: MEDLINE

## (undated) DEVICE — SUTURE VCRL + SZ 3-0 L18IN ABSRB UD SH 1/2 CIR TAPERCUT NDL VCP864D

## (undated) DEVICE — TIP COVER ACCESSORY

## (undated) DEVICE — ARM DRAPE

## (undated) DEVICE — SEAL

## (undated) DEVICE — TUBING FLTR PLUME AWAY EVAC W/ SUCT DEV DISP PUREVIEW